# Patient Record
Sex: FEMALE | Race: WHITE | Employment: OTHER | URBAN - METROPOLITAN AREA
[De-identification: names, ages, dates, MRNs, and addresses within clinical notes are randomized per-mention and may not be internally consistent; named-entity substitution may affect disease eponyms.]

---

## 2017-01-11 ENCOUNTER — OFFICE VISIT (OUTPATIENT)
Dept: DERMATOLOGY CLINIC | Facility: CLINIC | Age: 72
End: 2017-01-11

## 2017-01-11 DIAGNOSIS — C69.90 OCULAR MELANOMA, UNSPECIFIED LATERALITY (HCC): ICD-10-CM

## 2017-01-11 DIAGNOSIS — L57.0 AK (ACTINIC KERATOSIS): ICD-10-CM

## 2017-01-11 DIAGNOSIS — D23.60 BENIGN NEOPLASM OF SKIN OF UPPER LIMB, INCLUDING SHOULDER, UNSPECIFIED LATERALITY: ICD-10-CM

## 2017-01-11 DIAGNOSIS — L82.1 SEBORRHEIC KERATOSES: ICD-10-CM

## 2017-01-11 DIAGNOSIS — D23.5 BENIGN NEOPLASM OF SKIN OF TRUNK, EXCEPT SCROTUM: ICD-10-CM

## 2017-01-11 DIAGNOSIS — D23.30 BENIGN NEOPLASM OF SKIN OF FACE: ICD-10-CM

## 2017-01-11 DIAGNOSIS — D22.9 MULTIPLE NEVI: Primary | ICD-10-CM

## 2017-01-11 DIAGNOSIS — L72.0 EPIDERMAL CYST: ICD-10-CM

## 2017-01-11 DIAGNOSIS — D23.4 BENIGN NEOPLASM OF SCALP AND SKIN OF NECK: ICD-10-CM

## 2017-01-11 DIAGNOSIS — D23.70 BENIGN NEOPLASM OF SKIN OF LOWER LIMB, INCLUDING HIP, UNSPECIFIED LATERALITY: ICD-10-CM

## 2017-01-11 PROCEDURE — 99213 OFFICE O/P EST LOW 20 MIN: CPT | Performed by: DERMATOLOGY

## 2017-01-11 PROCEDURE — G0463 HOSPITAL OUTPT CLINIC VISIT: HCPCS | Performed by: DERMATOLOGY

## 2017-01-31 NOTE — PROGRESS NOTES
Mary Lou Sheth is a 66year old female. HPI:     CC:  Patient presents with:  Full Skin Exam: LOV 9/7/2016. Pt presenting for 4 month f/u and full body skin exam. Personal hx of melanoma to R eye.          Allergies:  Review of patient's allergies indic Calcium-Phosphorus-Vitamin D (CITRACAL +D3 OR) Take 1 tablet by mouth daily. Disp:  Rfl:    Vitamin B-12 (VITAMIN B12) 1000 MCG Oral Tab Take 1,000 mcg by mouth daily. Disp:  Rfl:    loratadine (CLARITIN) 10 MG Oral Tab Take 10 mg by mouth daily.  Disp: Social History Narrative     Family History   Problem Relation Age of Onset   • Heart Attack Father 47     MI; per NG   • Stroke Mother 66     per NG   • Breast Cancer Mother 67     per NG   • Heart Disorder Mother      CHF; per NG   • Hypertension Mot physical exam also:    Assessment / plan:    No orders of the defined types were placed in this encounter.        Meds & Refills for this Visit:  No prescriptions requested or ordered in this encounter      Multiple nevi  (primary encounter diagnosis)  Ak (

## 2017-02-10 ENCOUNTER — OFFICE VISIT (OUTPATIENT)
Dept: PODIATRY CLINIC | Facility: CLINIC | Age: 72
End: 2017-02-10

## 2017-02-10 DIAGNOSIS — B35.1 PAIN DUE TO ONYCHOMYCOSIS OF TOENAIL OF LEFT FOOT: Primary | ICD-10-CM

## 2017-02-10 DIAGNOSIS — B35.1 PAIN DUE TO ONYCHOMYCOSIS OF TOENAIL OF RIGHT FOOT: ICD-10-CM

## 2017-02-10 DIAGNOSIS — M79.675 PAIN DUE TO ONYCHOMYCOSIS OF TOENAIL OF LEFT FOOT: Primary | ICD-10-CM

## 2017-02-10 DIAGNOSIS — M79.674 PAIN DUE TO ONYCHOMYCOSIS OF TOENAIL OF RIGHT FOOT: ICD-10-CM

## 2017-02-10 PROCEDURE — 11721 DEBRIDE NAIL 6 OR MORE: CPT | Performed by: PODIATRIST

## 2017-02-10 NOTE — PROGRESS NOTES
HPI:    Patient ID: Louisa Sánchez is a 66year old female. HPI  This 41-year-old female presents with recurrent pain. Patient reports having relief by this care previously.   Review of Systems  I did review present medical status, medications were n onychomycosis of toenail of left foot  (primary encounter diagnosis)  Pain due to onychomycosis of toenail of right foot    No orders of the defined types were placed in this encounter.        Meds This Visit:  No prescriptions requested or ordered in this

## 2017-02-14 RX ORDER — PRAVASTATIN SODIUM 20 MG
TABLET ORAL
Qty: 90 TABLET | Refills: 1 | Status: SHIPPED | OUTPATIENT
Start: 2017-02-14 | End: 2017-04-25

## 2017-04-19 ENCOUNTER — LAB ENCOUNTER (OUTPATIENT)
Dept: LAB | Facility: HOSPITAL | Age: 72
End: 2017-04-19
Attending: INTERNAL MEDICINE
Payer: MEDICARE

## 2017-04-19 DIAGNOSIS — E78.5 HYPERLIPIDEMIA, UNSPECIFIED HYPERLIPIDEMIA TYPE: ICD-10-CM

## 2017-04-19 DIAGNOSIS — I10 ESSENTIAL HYPERTENSION WITH GOAL BLOOD PRESSURE LESS THAN 130/80: ICD-10-CM

## 2017-04-19 PROCEDURE — 85025 COMPLETE CBC W/AUTO DIFF WBC: CPT

## 2017-04-19 PROCEDURE — 81001 URINALYSIS AUTO W/SCOPE: CPT

## 2017-04-19 PROCEDURE — 36415 COLL VENOUS BLD VENIPUNCTURE: CPT

## 2017-04-19 PROCEDURE — 84443 ASSAY THYROID STIM HORMONE: CPT

## 2017-04-19 PROCEDURE — 80061 LIPID PANEL: CPT

## 2017-04-19 PROCEDURE — 80053 COMPREHEN METABOLIC PANEL: CPT

## 2017-04-25 PROBLEM — L03.316 CELLULITIS, UMBILICAL: Status: ACTIVE | Noted: 2017-04-25

## 2017-04-25 NOTE — ASSESSMENT & PLAN NOTE
Lipid panel and liver function tests look stable on pravastatin at 20 mg 1 tablet once daily.   LDL cholesterol seems slightly higher than usual.  Advised to drink plenty of fluids, exercise on a regular basis and restrict fatty foods, desserts and starches

## 2017-04-25 NOTE — ASSESSMENT & PLAN NOTE
Patient is currently stable on citalopram at 10 mg 1 tablet once daily. She is able to manage all her regular activities and stay social at this point.   Continue the same dose of medications and follow-up as discussed

## 2017-04-25 NOTE — ASSESSMENT & PLAN NOTE
Patient is status post radiation treatment to the eye about 5 years back for a malignant melanoma. No significant abnormalities noted at this time. She is due for follow-up with Dr. Vlad Figueroa has been provided.

## 2017-04-25 NOTE — ASSESSMENT & PLAN NOTE
Blood pressure 136/80, pulse 59, resp. rate 18, height 5' 3.5\" (1.613 m), weight 187 lb 3.2 oz (84.913 kg). Blood pressures upon recheck looks stable. Advised to restrict salt in the diet, drink plenty of fluids to keep hydrated.   Continue on metoprolo

## 2017-04-25 NOTE — PATIENT INSTRUCTIONS
Problem List Items Addressed This Visit        Unprioritized    Anxiety state - Primary     Patient is currently stable on citalopram at 10 mg 1 tablet once daily. She is able to manage all her regular activities and stay social at this point.   Continue t Relevant Orders    OP REFERRAL TO [place]

## 2017-04-25 NOTE — ASSESSMENT & PLAN NOTE
No Known Allergies  Started on Keflex 500 mg 1 tablet 2 times daily for 7 days. Keep the umbilicus clean and dry. Advised to dry the area after showers to avoid collection of soap and water in the fold.   May need to see surgery for an evaluation if rayshawn

## 2017-04-25 NOTE — PROGRESS NOTES
HPI:    Patient ID: Donell Nick is a 67year old female.     HPI Comments: Notes Recorded by Eugenio Childers MD on 4/19/2017 at 8:22 PM  Urine test looks slightly abnormal–seems to be an inappropriate collection. Lorin Kahn may need to repeat this after adequ history of hypothyroidism or obesity. There are no known factors aggravating her hyperlipidemia. Pertinent negatives include no focal sensory loss or myalgias.  (Wt Readings from Last 6 Encounters:  07/12/16 : 193 lb (87.544 kg)  04/12/16 : 186 lb 8 oz (84. 1 TAB PO Q D Disp: 90 tablet Rfl: 1   Pravastatin Sodium 20 MG Oral Tab Take 1 tablet (20 mg total) by mouth once daily. Disp: 90 tablet Rfl: 1   cephALEXin 500 MG Oral Cap Take 1 capsule (500 mg total) by mouth 2 (two) times daily.  Disp: 14 capsule Rfl: 0 normal. She exhibits no distension and no mass. There is no tenderness. There is no rebound. Musculoskeletal: Normal range of motion. She exhibits no edema or tenderness. Lymphadenopathy:     She has no cervical adenopathy.    Neurological: She is alert Other Relevant Orders    COMP METABOLIC PANEL (14)    LIPID PANEL    URINALYSIS, ROUTINE    Hyperlipidemia     Lipid panel and liver function tests look stable on pravastatin at 20 mg 1 tablet once daily.   LDL cholesterol seems slightly higher than usual.

## 2017-05-19 ENCOUNTER — OFFICE VISIT (OUTPATIENT)
Dept: PODIATRY CLINIC | Facility: CLINIC | Age: 72
End: 2017-05-19

## 2017-05-19 ENCOUNTER — TELEPHONE (OUTPATIENT)
Dept: OPHTHALMOLOGY | Facility: CLINIC | Age: 72
End: 2017-05-19

## 2017-05-19 ENCOUNTER — TELEPHONE (OUTPATIENT)
Dept: INTERNAL MEDICINE CLINIC | Facility: CLINIC | Age: 72
End: 2017-05-19

## 2017-05-19 DIAGNOSIS — M79.675 PAIN DUE TO ONYCHOMYCOSIS OF TOENAIL OF LEFT FOOT: Primary | ICD-10-CM

## 2017-05-19 DIAGNOSIS — Z00.00 PREVENTATIVE HEALTH CARE: Primary | ICD-10-CM

## 2017-05-19 DIAGNOSIS — M79.674 PAIN DUE TO ONYCHOMYCOSIS OF TOENAIL OF RIGHT FOOT: ICD-10-CM

## 2017-05-19 DIAGNOSIS — B35.1 PAIN DUE TO ONYCHOMYCOSIS OF TOENAIL OF LEFT FOOT: Primary | ICD-10-CM

## 2017-05-19 DIAGNOSIS — B35.1 PAIN DUE TO ONYCHOMYCOSIS OF TOENAIL OF RIGHT FOOT: ICD-10-CM

## 2017-05-19 PROCEDURE — 11721 DEBRIDE NAIL 6 OR MORE: CPT | Performed by: PODIATRIST

## 2017-05-19 NOTE — PROGRESS NOTES
HPI:    Patient ID: Danilo Barrios is a 67year old female. HPI  This 50-year-old female presents with recurrent pain associated with her fungus toenails. She reports relief by previous debridement.   Review of Systems  I reviewed medical status, med ASSESSMENT/PLAN:   Pain due to onychomycosis of toenail of left foot  (primary encounter diagnosis)  Pain due to onychomycosis of toenail of right foot    No orders of the defined types were placed in this encounter.        Meds This Visit:  No prescripti

## 2017-05-19 NOTE — TELEPHONE ENCOUNTER
Patient stopped in the office.   Scheduled appointment with Dr. Maureen Thornton for an eye exam on 6/14/17 at 8:15am/nw

## 2017-06-14 ENCOUNTER — OFFICE VISIT (OUTPATIENT)
Dept: OPHTHALMOLOGY | Facility: CLINIC | Age: 72
End: 2017-06-14

## 2017-06-14 DIAGNOSIS — C69.91 OCULAR MELANOMA, RIGHT (HCC): Primary | ICD-10-CM

## 2017-06-14 DIAGNOSIS — H25.13 AGE-RELATED NUCLEAR CATARACT OF BOTH EYES: ICD-10-CM

## 2017-06-14 PROCEDURE — 92014 COMPRE OPH EXAM EST PT 1/>: CPT | Performed by: OPHTHALMOLOGY

## 2017-06-14 PROCEDURE — 92015 DETERMINE REFRACTIVE STATE: CPT | Performed by: OPHTHALMOLOGY

## 2017-06-14 NOTE — ASSESSMENT & PLAN NOTE
Diagnosed in 2010 and treated by Dr. Luis Winters at [facility]. Continue to follow up yearly.

## 2017-06-14 NOTE — PATIENT INSTRUCTIONS
Age-related nuclear cataract of both eyes  Discussed with patient that there is a significant cataract in the right eye, but due to poor vision due to melanoma will not consider surgery unless Dr. Obie Meraz requests it.   Cataract in the left eye is not visuall

## 2017-06-14 NOTE — ASSESSMENT & PLAN NOTE
Discussed with patient that there is a significant cataract in the right eye, but due to poor vision due to melanoma will not consider surgery unless Dr. Josue Franco requests it.   Cataract in the left eye is not visually significant enough to remove at this time

## 2017-06-14 NOTE — PROGRESS NOTES
Lorelei Morse is a 67year old female. HPI:     HPI     Pt complains of blurred vision in her left eye and would like an updated Rx. Pt has been seeing a Dr. 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  Pt is not using Tablet 24 Hr 1 TAB PO Q D Disp: 90 tablet Rfl: 1   Pravastatin Sodium 20 MG Oral Tab Take 1 tablet (20 mg total) by mouth once daily. Disp: 90 tablet Rfl: 1   Calcium-Phosphorus-Vitamin D (CITRACAL +D3 OR) Take 1 tablet by mouth daily.  Disp:  Rfl:    Vitam Ratio  0.5    Macula red reflex only Normal    Vessels  Normal    Periphery RPE hypopigmentation nasally  Normal            Refraction     Wearing Rx      Sphere Cylinder Axis Add   Right -0.25 +0.75 180 +2.75   Left +0.00 +0.75 180 +2.75       Type:  Flat

## 2017-07-12 ENCOUNTER — OFFICE VISIT (OUTPATIENT)
Dept: DERMATOLOGY CLINIC | Facility: CLINIC | Age: 72
End: 2017-07-12

## 2017-07-12 DIAGNOSIS — D23.4 BENIGN NEOPLASM OF SCALP AND SKIN OF NECK: ICD-10-CM

## 2017-07-12 DIAGNOSIS — C69.90 OCULAR MELANOMA, UNSPECIFIED LATERALITY (HCC): ICD-10-CM

## 2017-07-12 DIAGNOSIS — D23.30 BENIGN NEOPLASM OF SKIN OF FACE: ICD-10-CM

## 2017-07-12 DIAGNOSIS — D23.70 BENIGN NEOPLASM OF SKIN OF LOWER LIMB, INCLUDING HIP, UNSPECIFIED LATERALITY: ICD-10-CM

## 2017-07-12 DIAGNOSIS — L82.1 SEBORRHEIC KERATOSES: ICD-10-CM

## 2017-07-12 DIAGNOSIS — D23.60 BENIGN NEOPLASM OF SKIN OF UPPER LIMB, INCLUDING SHOULDER, UNSPECIFIED LATERALITY: ICD-10-CM

## 2017-07-12 DIAGNOSIS — D22.9 MULTIPLE NEVI: Primary | ICD-10-CM

## 2017-07-12 DIAGNOSIS — D23.5 BENIGN NEOPLASM OF SKIN OF TRUNK, EXCEPT SCROTUM: ICD-10-CM

## 2017-07-12 PROCEDURE — G0463 HOSPITAL OUTPT CLINIC VISIT: HCPCS | Performed by: DERMATOLOGY

## 2017-07-12 PROCEDURE — 99213 OFFICE O/P EST LOW 20 MIN: CPT | Performed by: DERMATOLOGY

## 2017-07-12 RX ORDER — KETOCONAZOLE 20 MG/G
CREAM TOPICAL
Qty: 60 G | Refills: 3 | Status: SHIPPED | OUTPATIENT
Start: 2017-07-12 | End: 2018-04-26

## 2017-07-12 RX ORDER — KETOCONAZOLE 20 MG/G
CREAM TOPICAL
Qty: 30 G | Refills: 3 | Status: SHIPPED | OUTPATIENT
Start: 2017-07-12 | End: 2017-07-12

## 2017-07-25 ENCOUNTER — APPOINTMENT (OUTPATIENT)
Dept: LAB | Facility: HOSPITAL | Age: 72
End: 2017-07-25
Attending: INTERNAL MEDICINE
Payer: MEDICARE

## 2017-07-25 DIAGNOSIS — I10 ESSENTIAL HYPERTENSION: ICD-10-CM

## 2017-07-25 DIAGNOSIS — E78.5 HYPERLIPIDEMIA, UNSPECIFIED HYPERLIPIDEMIA TYPE: ICD-10-CM

## 2017-07-25 LAB
ALBUMIN SERPL BCP-MCNC: 3.9 G/DL (ref 3.5–4.8)
ALBUMIN/GLOB SERPL: 1.2 {RATIO} (ref 1–2)
ALP SERPL-CCNC: 79 U/L (ref 32–100)
ANION GAP SERPL CALC-SCNC: 9 MMOL/L (ref 0–18)
BILIRUB UR QL: NEGATIVE
BUN SERPL-MCNC: 16 MG/DL (ref 8–20)
BUN/CREAT SERPL: 17.2 (ref 10–20)
CALCIUM SERPL-MCNC: 9.2 MG/DL (ref 8.5–10.5)
CHLORIDE SERPL-SCNC: 103 MMOL/L (ref 95–110)
CHOLEST SERPL-MCNC: 192 MG/DL (ref 110–200)
CLARITY UR: CLEAR
CO2 SERPL-SCNC: 25 MMOL/L (ref 22–32)
COLOR UR: YELLOW
CREAT SERPL-MCNC: 0.93 MG/DL (ref 0.5–1.5)
GLOBULIN PLAS-MCNC: 3.3 G/DL (ref 2.5–3.7)
GLUCOSE SERPL-MCNC: 109 MG/DL (ref 70–99)
GLUCOSE UR-MCNC: NEGATIVE MG/DL
HDLC SERPL-MCNC: 49 MG/DL
HGB UR QL STRIP.AUTO: NEGATIVE
KETONES UR-MCNC: NEGATIVE MG/DL
LDLC SERPL CALC-MCNC: 101 MG/DL (ref 0–99)
NITRITE UR QL STRIP.AUTO: NEGATIVE
NONHDLC SERPL-MCNC: 143 MG/DL
OSMOLALITY UR CALC.SUM OF ELEC: 286 MOSM/KG (ref 275–295)
PATIENT FASTING: YES
PH UR: 6 [PH] (ref 5–8)
PROT SERPL-MCNC: 7.2 G/DL (ref 5.9–8.4)
PROT UR-MCNC: NEGATIVE MG/DL
RBC #/AREA URNS AUTO: <1 /HPF
SODIUM SERPL-SCNC: 137 MMOL/L (ref 136–144)
SP GR UR STRIP: 1.01 (ref 1–1.03)
TRIGL SERPL-MCNC: 208 MG/DL (ref 1–149)
UROBILINOGEN UR STRIP-ACNC: <2
VIT C UR-MCNC: NEGATIVE MG/DL
WBC #/AREA URNS AUTO: 2 /HPF

## 2017-07-25 PROCEDURE — 80053 COMPREHEN METABOLIC PANEL: CPT

## 2017-07-25 PROCEDURE — 80061 LIPID PANEL: CPT

## 2017-07-25 PROCEDURE — 36415 COLL VENOUS BLD VENIPUNCTURE: CPT

## 2017-07-25 PROCEDURE — 81001 URINALYSIS AUTO W/SCOPE: CPT

## 2017-07-27 ENCOUNTER — OFFICE VISIT (OUTPATIENT)
Dept: INTERNAL MEDICINE CLINIC | Facility: CLINIC | Age: 72
End: 2017-07-27

## 2017-07-27 VITALS
HEART RATE: 67 BPM | WEIGHT: 186.81 LBS | RESPIRATION RATE: 20 BRPM | TEMPERATURE: 98 F | DIASTOLIC BLOOD PRESSURE: 80 MMHG | SYSTOLIC BLOOD PRESSURE: 136 MMHG | HEIGHT: 63.5 IN | BODY MASS INDEX: 32.69 KG/M2

## 2017-07-27 DIAGNOSIS — Z78.0 MENOPAUSE: ICD-10-CM

## 2017-07-27 DIAGNOSIS — E55.9 VITAMIN D DEFICIENCY: ICD-10-CM

## 2017-07-27 DIAGNOSIS — I10 ESSENTIAL HYPERTENSION: Primary | ICD-10-CM

## 2017-07-27 DIAGNOSIS — R73.9 ELEVATED BLOOD SUGAR: ICD-10-CM

## 2017-07-27 DIAGNOSIS — E78.5 HYPERLIPIDEMIA, UNSPECIFIED HYPERLIPIDEMIA TYPE: ICD-10-CM

## 2017-07-27 DIAGNOSIS — L03.316 CELLULITIS, UMBILICAL: ICD-10-CM

## 2017-07-27 PROCEDURE — 99214 OFFICE O/P EST MOD 30 MIN: CPT | Performed by: INTERNAL MEDICINE

## 2017-07-27 PROCEDURE — G0463 HOSPITAL OUTPT CLINIC VISIT: HCPCS | Performed by: INTERNAL MEDICINE

## 2017-07-27 NOTE — PROGRESS NOTES
HPI:    Patient ID: Bree Willis is a 67year old female. Labs discussed–blood sugars are elevated, triglycerides are quite high up to 208 at this time from 113 in the past.  Renal functions–the EGFR slightly low at 59.   Rest of the labs look basic Current antihyperlipidemic treatment includes diet change, exercise and statins. The current treatment provides moderate improvement of lipids. Compliance problems include adherence to diet and adherence to exercise.   Risk factors for coronary artery disea folic acid (FOLVITE) 319 MCG Oral Tab Take 400 mcg by mouth daily. Disp:  Rfl:    magnesium 250 MG Oral Tab Take 250 mg by mouth daily. Disp:  Rfl:    Acetaminophen (TYLENOL EXTRA STRENGTH OR) Take  by mouth.  Disp:  Rfl:    aspirin 325 MG Oral Tab Take Psychiatric: Her behavior is normal. Judgment and thought content normal. Her mood appears anxious. Her speech is rapid and/or pressured. Cognition and memory are normal.   Nursing note and vitals reviewed.              ASSESSMENT/PLAN:   Essential hypert (CPT=77080)         Return in about 3 months (around 10/27/2017).   PT UNDERSTANDS AND AGREES TO FOLLOW DIRECTIONS AND ADVICE         Orders Placed This Encounter      Comp Metabolic Panel (14) [E]      Hemoglobin A1C [E]      Lipid Panel [E]      Patti Everett

## 2017-07-27 NOTE — ASSESSMENT & PLAN NOTE
Advised to continue to use the ketoconazole as discussed with dermatology.   No further intervention

## 2017-07-27 NOTE — ASSESSMENT & PLAN NOTE
Has been supplemented in the past.  Continue to take an over-the-counter vitamin D supplement at 2000 units once daily. Recheck labs and DEXA scan has been ordered.

## 2017-07-27 NOTE — ASSESSMENT & PLAN NOTE
Lipid panel and liver function tests discussed. Triglycerides are quite high. Strict diet control, exercise on a daily basis and recheck labs as ordered. See me in 3 months.

## 2017-07-27 NOTE — PATIENT INSTRUCTIONS
Problem List Items Addressed This Visit        Unprioritized    Cellulitis, umbilical     Advised to continue to use the ketoconazole as discussed with dermatology.   No further intervention         Essential hypertension - Primary     Blood pressure 136/80

## 2017-07-27 NOTE — ASSESSMENT & PLAN NOTE
Blood pressure 136/80, pulse 67, temperature 98.2 °F (36.8 °C), temperature source Oral, resp. rate 20, height 5' 3.5\" (1.613 m), weight 186 lb 12.8 oz (84.7 kg), not currently breastfeeding. Stable blood pressure on metoprolol.   Tolerating current medic

## 2017-07-30 NOTE — PROGRESS NOTES
Faria Cousin is a 67year old female. HPI:     CC:  Patient presents with:  Full Skin Exam: LOV 1/11/2017. Pt presenting for 6 month full body skin exam. Pt has a personal hx of melanoma to R eye.   Rash Skin Problem (integumentary): Pt was previously External Ointment Apply 1 Application topically 3 (three) times daily. Disp: 30 g Rfl: 3   ketoconazole 2 % External Cream Apply to affected area 2 times daily.  Disp: 60 g Rfl: 3   Citalopram Hydrobromide 10 MG Oral Tab Take 1 tablet (10 mg total) by mouth education: N/A  Number of children: N/A     Occupational History  None on file     Social History Main Topics   Smoking status: Former Smoker  0.50 Packs/day  For 14.00 Years     Types: Cigarettes    Quit date: 1/1/1979    Smokeless tobacco: Never Used performed, including scalp, head, neck, face,nails, hair, external eyes, including conjunctival mucosa, eyelids, lips external ears, back, chest,/ breasts, axillae,  abdomen, arms, legs, palms.      Multiple light to medium brown, well marginated, uniformly seborrheic  keratoses, cherry angiomas:  Reassurance regarding other benign skin lesions. Signs and symptoms of skin cancer, ABCDE's of melanoma discussed with patient. Sunscreen use, sun protection, self exams reviewed.   Followup as noted RTC routine check

## 2017-08-21 ENCOUNTER — OFFICE VISIT (OUTPATIENT)
Dept: PODIATRY CLINIC | Facility: CLINIC | Age: 72
End: 2017-08-21

## 2017-08-21 ENCOUNTER — HOSPITAL ENCOUNTER (OUTPATIENT)
Dept: MAMMOGRAPHY | Facility: HOSPITAL | Age: 72
Discharge: HOME | End: 2017-08-21
Attending: INTERNAL MEDICINE | Admitting: PODIATRIST
Payer: MEDICARE

## 2017-08-21 DIAGNOSIS — B35.1 PAIN DUE TO ONYCHOMYCOSIS OF TOENAIL OF RIGHT FOOT: ICD-10-CM

## 2017-08-21 DIAGNOSIS — M79.675 PAIN DUE TO ONYCHOMYCOSIS OF TOENAIL OF LEFT FOOT: Primary | ICD-10-CM

## 2017-08-21 DIAGNOSIS — B35.1 PAIN DUE TO ONYCHOMYCOSIS OF TOENAIL OF LEFT FOOT: Primary | ICD-10-CM

## 2017-08-21 DIAGNOSIS — Z00.00 PREVENTATIVE HEALTH CARE: ICD-10-CM

## 2017-08-21 DIAGNOSIS — M79.674 PAIN DUE TO ONYCHOMYCOSIS OF TOENAIL OF RIGHT FOOT: ICD-10-CM

## 2017-08-21 PROCEDURE — 77067 SCR MAMMO BI INCL CAD: CPT | Performed by: INTERNAL MEDICINE

## 2017-08-21 PROCEDURE — 11721 DEBRIDE NAIL 6 OR MORE: CPT | Performed by: PODIATRIST

## 2017-08-21 NOTE — PROGRESS NOTES
HPI:    Patient ID: Noble Monte is a 67year old female. HPI  This 56-year-old female presents with recurrent pain associated with her fungus toenails. She reports relief by previous care.   Review of Systems  I did review present medical status, relief will see patient for care if and when symptoms redevelop         ASSESSMENT/PLAN:   Pain due to onychomycosis of toenail of left foot  (primary encounter diagnosis)  Pain due to onychomycosis of toenail of right foot    No orders of the defined type

## 2017-08-22 ENCOUNTER — HOSPITAL ENCOUNTER (OUTPATIENT)
Dept: BONE DENSITY | Facility: HOSPITAL | Age: 72
Discharge: HOME | End: 2017-08-22
Attending: INTERNAL MEDICINE
Payer: MEDICARE

## 2017-08-22 DIAGNOSIS — E55.9 VITAMIN D DEFICIENCY: ICD-10-CM

## 2017-08-22 DIAGNOSIS — Z78.0 MENOPAUSE: ICD-10-CM

## 2017-08-22 PROCEDURE — 77080 DXA BONE DENSITY AXIAL: CPT | Performed by: INTERNAL MEDICINE

## 2017-09-12 RX ORDER — CITALOPRAM 10 MG/1
TABLET ORAL
Qty: 90 TABLET | Refills: 0 | Status: SHIPPED | OUTPATIENT
Start: 2017-09-12 | End: 2017-10-26

## 2017-10-19 ENCOUNTER — APPOINTMENT (OUTPATIENT)
Dept: LAB | Facility: HOSPITAL | Age: 72
End: 2017-10-19
Attending: INTERNAL MEDICINE
Payer: MEDICARE

## 2017-10-19 DIAGNOSIS — R73.9 ELEVATED BLOOD SUGAR: ICD-10-CM

## 2017-10-19 DIAGNOSIS — E78.5 HYPERLIPIDEMIA, UNSPECIFIED HYPERLIPIDEMIA TYPE: ICD-10-CM

## 2017-10-19 DIAGNOSIS — E55.9 VITAMIN D DEFICIENCY: ICD-10-CM

## 2017-10-19 DIAGNOSIS — I10 ESSENTIAL HYPERTENSION: ICD-10-CM

## 2017-10-19 PROCEDURE — 81001 URINALYSIS AUTO W/SCOPE: CPT

## 2017-10-19 PROCEDURE — 36415 COLL VENOUS BLD VENIPUNCTURE: CPT

## 2017-10-19 PROCEDURE — 82306 VITAMIN D 25 HYDROXY: CPT

## 2017-10-19 PROCEDURE — 80061 LIPID PANEL: CPT

## 2017-10-19 PROCEDURE — 83036 HEMOGLOBIN GLYCOSYLATED A1C: CPT

## 2017-10-19 PROCEDURE — 80053 COMPREHEN METABOLIC PANEL: CPT

## 2017-10-26 ENCOUNTER — OFFICE VISIT (OUTPATIENT)
Dept: INTERNAL MEDICINE CLINIC | Facility: CLINIC | Age: 72
End: 2017-10-26

## 2017-10-26 VITALS
SYSTOLIC BLOOD PRESSURE: 136 MMHG | HEART RATE: 66 BPM | TEMPERATURE: 98 F | WEIGHT: 189 LBS | HEIGHT: 63.5 IN | RESPIRATION RATE: 20 BRPM | BODY MASS INDEX: 33.07 KG/M2 | DIASTOLIC BLOOD PRESSURE: 78 MMHG

## 2017-10-26 DIAGNOSIS — E55.9 VITAMIN D DEFICIENCY: ICD-10-CM

## 2017-10-26 DIAGNOSIS — I10 ESSENTIAL HYPERTENSION: ICD-10-CM

## 2017-10-26 DIAGNOSIS — E78.5 HYPERLIPIDEMIA, UNSPECIFIED HYPERLIPIDEMIA TYPE: Primary | ICD-10-CM

## 2017-10-26 PROCEDURE — G0009 ADMIN PNEUMOCOCCAL VACCINE: HCPCS | Performed by: INTERNAL MEDICINE

## 2017-10-26 PROCEDURE — 90670 PCV13 VACCINE IM: CPT | Performed by: INTERNAL MEDICINE

## 2017-10-26 PROCEDURE — G0463 HOSPITAL OUTPT CLINIC VISIT: HCPCS | Performed by: INTERNAL MEDICINE

## 2017-10-26 PROCEDURE — 99214 OFFICE O/P EST MOD 30 MIN: CPT | Performed by: INTERNAL MEDICINE

## 2017-10-26 RX ORDER — PRAVASTATIN SODIUM 20 MG
20 TABLET ORAL
Qty: 90 TABLET | Refills: 1 | Status: SHIPPED | OUTPATIENT
Start: 2017-10-26 | End: 2018-07-19

## 2017-10-26 RX ORDER — METOPROLOL SUCCINATE 25 MG/1
TABLET, EXTENDED RELEASE ORAL
Qty: 90 TABLET | Refills: 1 | Status: SHIPPED | OUTPATIENT
Start: 2017-10-26 | End: 2018-03-14

## 2017-10-26 RX ORDER — CITALOPRAM 10 MG/1
10 TABLET ORAL
Qty: 90 TABLET | Refills: 1 | Status: SHIPPED | OUTPATIENT
Start: 2017-10-26 | End: 2018-04-26

## 2017-10-26 NOTE — PROGRESS NOTES
HPI:    Patient ID: Zeenat Choudhary is a 67year old female. Labs, mammogram and DEXA scan discussed. Hypertension   This is a chronic problem. The current episode started more than 1 year ago.  The problem has been gradually improving since onset dyslipidemia. Review of Systems   Constitutional: Negative. HENT: Negative. Eyes: Negative. Respiratory: Negative. Cardiovascular: Negative. Gastrointestinal: Negative. Endocrine: Negative. Genitourinary: Negative.     Musculoskel External ear normal.   Left Ear: External ear normal.   Nose: Nose normal.   Mouth/Throat: Oropharynx is clear and moist. No oropharyngeal exudate. Eyes: Conjunctivae and EOM are normal. Pupils are equal, round, and reactive to light.  Right eye exhibits months. Patient has taken her flu shot. Due for a Tdap–advised to take it at the pharmacy is not covered per Medicare in the office. She is due for Prevnar which has been provided today. Immunizations records as follows.   Immunization History   Adminis

## 2017-10-26 NOTE — ASSESSMENT & PLAN NOTE
Blood pressure 136/78, pulse 66, temperature 98 °F (36.7 °C), temperature source Oral, resp. rate 20, height 5' 3.5\" (1.613 m), weight 189 lb (85.7 kg), not currently breastfeeding. Blood pressure looks stable and metoprolol.   Tolerating medications we

## 2017-10-26 NOTE — PATIENT INSTRUCTIONS
Problem List Items Addressed This Visit        Unprioritized    Essential hypertension     Blood pressure 136/78, pulse 66, temperature 98 °F (36.7 °C), temperature source Oral, resp.  rate 20, height 5' 3.5\" (1.613 m), weight 189 lb (85.7 kg), not current

## 2017-10-26 NOTE — ASSESSMENT & PLAN NOTE
Lipid panel shows significant improvement in the triglycerides. Continue strict diet controlled at this time and will follow up with recheck labs in 6 months. Patient has taken her flu shot.   Due for a Tdap–advised to take it at the pharmacy is not cover

## 2017-11-21 ENCOUNTER — OFFICE VISIT (OUTPATIENT)
Dept: PODIATRY CLINIC | Facility: CLINIC | Age: 72
End: 2017-11-21

## 2017-11-21 DIAGNOSIS — B35.1 PAIN DUE TO ONYCHOMYCOSIS OF TOENAIL OF LEFT FOOT: Primary | ICD-10-CM

## 2017-11-21 DIAGNOSIS — M79.674 PAIN DUE TO ONYCHOMYCOSIS OF TOENAIL OF RIGHT FOOT: ICD-10-CM

## 2017-11-21 DIAGNOSIS — M79.675 PAIN DUE TO ONYCHOMYCOSIS OF TOENAIL OF LEFT FOOT: Primary | ICD-10-CM

## 2017-11-21 DIAGNOSIS — B35.1 PAIN DUE TO ONYCHOMYCOSIS OF TOENAIL OF RIGHT FOOT: ICD-10-CM

## 2017-11-21 PROCEDURE — 11721 DEBRIDE NAIL 6 OR MORE: CPT | Performed by: PODIATRIST

## 2017-11-21 NOTE — PROGRESS NOTES
HPI:    Patient ID: Glenroy Charles is a 67year old female. HPI  This 24-year-old female presents and asked that I care for her painful toenails. She reports relief by previous debridement.   Review of Systems  I did review medical status and medicat I anticipate relief and will see patient for care if and when symptoms redevelop         ASSESSMENT/PLAN:   Pain due to onychomycosis of toenail of left foot  (primary encounter diagnosis)  Pain due to onychomycosis of toenail of right foot    No orders of

## 2017-12-14 ENCOUNTER — MED REC SCAN ONLY (OUTPATIENT)
Dept: INTERNAL MEDICINE CLINIC | Facility: CLINIC | Age: 72
End: 2017-12-14

## 2018-01-17 ENCOUNTER — OFFICE VISIT (OUTPATIENT)
Dept: DERMATOLOGY CLINIC | Facility: CLINIC | Age: 73
End: 2018-01-17

## 2018-01-17 DIAGNOSIS — C69.90 OCULAR MELANOMA, UNSPECIFIED LATERALITY (HCC): ICD-10-CM

## 2018-01-17 DIAGNOSIS — L57.0 AK (ACTINIC KERATOSIS): ICD-10-CM

## 2018-01-17 DIAGNOSIS — D23.30 BENIGN NEOPLASM OF SKIN OF FACE: ICD-10-CM

## 2018-01-17 DIAGNOSIS — D23.4 BENIGN NEOPLASM OF SCALP AND SKIN OF NECK: ICD-10-CM

## 2018-01-17 DIAGNOSIS — L30.9 DERMATITIS: ICD-10-CM

## 2018-01-17 DIAGNOSIS — D23.60 BENIGN NEOPLASM OF SKIN OF UPPER LIMB, INCLUDING SHOULDER, UNSPECIFIED LATERALITY: ICD-10-CM

## 2018-01-17 DIAGNOSIS — D23.5 BENIGN NEOPLASM OF SKIN OF TRUNK, EXCEPT SCROTUM: ICD-10-CM

## 2018-01-17 DIAGNOSIS — B37.2 INTERTRIGINOUS CANDIDIASIS: Primary | ICD-10-CM

## 2018-01-17 DIAGNOSIS — D23.70 BENIGN NEOPLASM OF SKIN OF LOWER LIMB, INCLUDING HIP, UNSPECIFIED LATERALITY: ICD-10-CM

## 2018-01-17 DIAGNOSIS — L82.1 SEBORRHEIC KERATOSES: ICD-10-CM

## 2018-01-17 DIAGNOSIS — D22.9 MULTIPLE NEVI: ICD-10-CM

## 2018-01-17 PROCEDURE — G0463 HOSPITAL OUTPT CLINIC VISIT: HCPCS | Performed by: DERMATOLOGY

## 2018-01-17 PROCEDURE — 99213 OFFICE O/P EST LOW 20 MIN: CPT | Performed by: DERMATOLOGY

## 2018-01-29 NOTE — PROGRESS NOTES
Sridhar Garcia is a 67year old female. HPI:     CC:  Patient presents with:  Full Skin Exam:  LOV- 7-12-17- Pt presenting today for 6 month full body skin exam pt denies something new or changing Personal hx of melanoma to R eye.    Rash: Pt also zohra Cigarettes     Quit date: 1/1/1979  Smokeless tobacco: Never Used                      Alcohol use:  No                   Current Outpatient Prescriptions:  Metoprolol Succinate ER 25 MG Oral Tablet 24 Hr 1 TAB PO Q D Disp: 90 tablet Rfl: 1   Citalopram Hyd HYSTERECTOMY  2010: OTHER SURGICAL HISTORY      Comment: Melanoma of Choroid: Radiation plaque for 7                days; treated    Social History  Social History   Marital status:   Spouse name: N/A    Years of education: N/A  Number of children: allergies reviewed as noted. ROS:  Denies any other systemic complaints. No new or changeing lesions other than noted above. No fevers, chills, night sweats, unusual sun sensitivity. No other skin complaints.         History, medications, allergies r has not been using anything since that time. More redness around it been very dry crusty previously now just patchy erythema. Discussed topical and oral medications. Would continue with the mupirocin fluconazole again if this flares up.   Consider repeat

## 2018-03-14 NOTE — TELEPHONE ENCOUNTER
Per pt, she needs refill on her Metoprolol Succinate ER need to send to mail in pharmacy,.       Current Outpatient Prescriptions:  Metoprolol Succinate ER 25 MG Oral Tablet 24 Hr 1 TAB PO Q D Disp: 90 tablet Rfl: 1

## 2018-03-16 RX ORDER — METOPROLOL SUCCINATE 25 MG/1
TABLET, EXTENDED RELEASE ORAL
Qty: 90 TABLET | Refills: 1 | Status: SHIPPED | OUTPATIENT
Start: 2018-03-16 | End: 2018-08-07

## 2018-03-23 ENCOUNTER — OFFICE VISIT (OUTPATIENT)
Dept: PODIATRY CLINIC | Facility: CLINIC | Age: 73
End: 2018-03-23

## 2018-03-23 DIAGNOSIS — B35.1 PAIN DUE TO ONYCHOMYCOSIS OF TOENAIL OF RIGHT FOOT: ICD-10-CM

## 2018-03-23 DIAGNOSIS — M79.674 PAIN DUE TO ONYCHOMYCOSIS OF TOENAIL OF RIGHT FOOT: ICD-10-CM

## 2018-03-23 DIAGNOSIS — B35.1 PAIN DUE TO ONYCHOMYCOSIS OF TOENAIL OF LEFT FOOT: Primary | ICD-10-CM

## 2018-03-23 DIAGNOSIS — M79.675 PAIN DUE TO ONYCHOMYCOSIS OF TOENAIL OF LEFT FOOT: Primary | ICD-10-CM

## 2018-03-23 PROCEDURE — 11721 DEBRIDE NAIL 6 OR MORE: CPT | Performed by: PODIATRIST

## 2018-03-23 NOTE — PROGRESS NOTES
HPI:    Patient ID: Ravi Cr is a 67year old female. HPI  This 69-year-old female presents with recurrent pain associated with her nails. Patient reports relief by previous care.   I did review medical status, medications were noted and she ha debridement.   I anticipate relief will see patient for care if and when symptoms redevelop         ASSESSMENT/PLAN:   Pain due to onychomycosis of toenail of left foot  (primary encounter diagnosis)  Pain due to onychomycosis of toenail of right foot    No

## 2018-04-20 ENCOUNTER — LAB ENCOUNTER (OUTPATIENT)
Dept: LAB | Facility: HOSPITAL | Age: 73
End: 2018-04-20
Attending: INTERNAL MEDICINE
Payer: MEDICARE

## 2018-04-20 DIAGNOSIS — E78.5 HYPERLIPIDEMIA, UNSPECIFIED HYPERLIPIDEMIA TYPE: ICD-10-CM

## 2018-04-20 PROCEDURE — 80061 LIPID PANEL: CPT

## 2018-04-20 PROCEDURE — 36415 COLL VENOUS BLD VENIPUNCTURE: CPT

## 2018-04-20 PROCEDURE — 80053 COMPREHEN METABOLIC PANEL: CPT

## 2018-04-20 PROCEDURE — 85025 COMPLETE CBC W/AUTO DIFF WBC: CPT

## 2018-04-26 ENCOUNTER — OFFICE VISIT (OUTPATIENT)
Dept: INTERNAL MEDICINE CLINIC | Facility: CLINIC | Age: 73
End: 2018-04-26

## 2018-04-26 VITALS
DIASTOLIC BLOOD PRESSURE: 80 MMHG | SYSTOLIC BLOOD PRESSURE: 130 MMHG | RESPIRATION RATE: 18 BRPM | WEIGHT: 190 LBS | HEIGHT: 63.5 IN | HEART RATE: 63 BPM | TEMPERATURE: 99 F | BODY MASS INDEX: 33.25 KG/M2

## 2018-04-26 DIAGNOSIS — Z98.890 H/O CAROTID ENDARTERECTOMY: ICD-10-CM

## 2018-04-26 DIAGNOSIS — E78.5 HYPERLIPIDEMIA, UNSPECIFIED HYPERLIPIDEMIA TYPE: ICD-10-CM

## 2018-04-26 DIAGNOSIS — R09.89 LEFT CAROTID BRUIT: ICD-10-CM

## 2018-04-26 DIAGNOSIS — I10 ESSENTIAL HYPERTENSION: ICD-10-CM

## 2018-04-26 DIAGNOSIS — E55.9 VITAMIN D DEFICIENCY: Primary | ICD-10-CM

## 2018-04-26 DIAGNOSIS — F32.A MILD DEPRESSION: ICD-10-CM

## 2018-04-26 DIAGNOSIS — C69.91 MALIGNANT MELANOMA OF RIGHT EYE (HCC): ICD-10-CM

## 2018-04-26 PROCEDURE — G0463 HOSPITAL OUTPT CLINIC VISIT: HCPCS | Performed by: INTERNAL MEDICINE

## 2018-04-26 PROCEDURE — 99214 OFFICE O/P EST MOD 30 MIN: CPT | Performed by: INTERNAL MEDICINE

## 2018-04-26 RX ORDER — CITALOPRAM 10 MG/1
10 TABLET ORAL
Qty: 90 TABLET | Refills: 1 | Status: SHIPPED | OUTPATIENT
Start: 2018-04-26 | End: 2018-10-31

## 2018-04-26 NOTE — ASSESSMENT & PLAN NOTE
Patient has been stable on over-the-counter vitamin D at 2000 units daily. Advised to continue the same.

## 2018-04-26 NOTE — PROGRESS NOTES
HPI:    Patient ID: Nakul De Los Santos is a 75year old female. Hyperlipidemia   This is a chronic problem. The current episode started more than 1 year ago. The problem is controlled.  Recent lipid tests were reviewed and are normal. She has no history o Allergic/Immunologic: Negative. Neurological: Negative. Hematological: Negative. Psychiatric/Behavioral: Positive for decreased concentration and dysphoric mood.               Current Outpatient Prescriptions:  Citalopram Hydrobromide 10 MG Oral present. Cardiovascular: Normal rate, regular rhythm, normal heart sounds and intact distal pulses. No murmur heard. Pulmonary/Chest: Effort normal and breath sounds normal. She has no wheezes. She has no rales. She exhibits no tenderness.    Abdomina visit shows persistent elevation though did improve prior to that. Patient has not been controlling her diet carefully–advised to watch the diet for fatty foods, fried foods, desserts sugars and juices.   Will recheck prior to the next office visit and con

## 2018-04-26 NOTE — ASSESSMENT & PLAN NOTE
History of right carotid endarterectomy. Currently with a left carotid bruit. Ultrasound carotids have been ordered. Last completed in 2015.

## 2018-04-26 NOTE — ASSESSMENT & PLAN NOTE
Patient has been on pravastatin at 20 mg daily. She has had fluctuating triglycerides. She was placed on strict diet control additionally and on her blood tests at her last visit shows persistent elevation though did improve prior to that.   Patient has n

## 2018-04-26 NOTE — ASSESSMENT & PLAN NOTE
Mild depression–patient has been on citalopram–10 mg 1 tablet once daily. Continue the same dose of medications.

## 2018-04-26 NOTE — ASSESSMENT & PLAN NOTE
Blood pressure 130/80, pulse 63, temperature 98.6 °F (37 °C), temperature source Oral, resp. rate 18, height 5' 3.5\" (1.613 m), weight 190 lb (86.2 kg), not currently breastfeeding. Stable blood pressure upon recheck.   Continue on metoprolol at 25 mg onc

## 2018-04-26 NOTE — PATIENT INSTRUCTIONS
Problem List Items Addressed This Visit        Unprioritized    Essential hypertension     Blood pressure 130/80, pulse 63, temperature 98.6 °F (37 °C), temperature source Oral, resp.  rate 18, height 5' 3.5\" (1.613 m), weight 190 lb (86.2 kg), not current ASAP–Dr. Devon Holcomb, referral generated. Additional findings of posterior vitreous detachment–stable on the left side. Vitamin D deficiency - Primary     Patient has been stable on over-the-counter vitamin D at 2000 units daily.   Advised to continue th

## 2018-04-26 NOTE — ASSESSMENT & PLAN NOTE
Patient was diagnosed in 2010  and treated per Dr. Romeo Adam. Last follow-up in December 2017–  Regressed choroidal melanoma–OD. Tubal mass seems to be well treated by plaque radiation.   Patient was advised at that time about the possibility of tumor spreadi

## 2018-07-16 ENCOUNTER — OFFICE VISIT (OUTPATIENT)
Dept: DERMATOLOGY CLINIC | Facility: CLINIC | Age: 73
End: 2018-07-16

## 2018-07-16 DIAGNOSIS — D22.9 MULTIPLE NEVI: Primary | ICD-10-CM

## 2018-07-16 DIAGNOSIS — L72.0 EPIDERMAL CYST: ICD-10-CM

## 2018-07-16 DIAGNOSIS — D23.5 BENIGN NEOPLASM OF SKIN OF TRUNK, EXCEPT SCROTUM: ICD-10-CM

## 2018-07-16 DIAGNOSIS — D23.30 BENIGN NEOPLASM OF SKIN OF FACE: ICD-10-CM

## 2018-07-16 DIAGNOSIS — B37.2 INTERTRIGINOUS CANDIDIASIS: ICD-10-CM

## 2018-07-16 DIAGNOSIS — D23.70 BENIGN NEOPLASM OF SKIN OF LOWER LIMB, INCLUDING HIP, UNSPECIFIED LATERALITY: ICD-10-CM

## 2018-07-16 DIAGNOSIS — L82.1 SEBORRHEIC KERATOSES: ICD-10-CM

## 2018-07-16 DIAGNOSIS — D23.60 BENIGN NEOPLASM OF SKIN OF UPPER LIMB, INCLUDING SHOULDER, UNSPECIFIED LATERALITY: ICD-10-CM

## 2018-07-16 DIAGNOSIS — C69.90 OCULAR MELANOMA, UNSPECIFIED LATERALITY (HCC): ICD-10-CM

## 2018-07-16 DIAGNOSIS — D23.4 BENIGN NEOPLASM OF SCALP AND SKIN OF NECK: ICD-10-CM

## 2018-07-16 DIAGNOSIS — L57.0 AK (ACTINIC KERATOSIS): ICD-10-CM

## 2018-07-16 PROCEDURE — G0463 HOSPITAL OUTPT CLINIC VISIT: HCPCS | Performed by: DERMATOLOGY

## 2018-07-16 PROCEDURE — 99213 OFFICE O/P EST LOW 20 MIN: CPT | Performed by: DERMATOLOGY

## 2018-07-19 ENCOUNTER — OFFICE VISIT (OUTPATIENT)
Dept: OTOLARYNGOLOGY | Facility: CLINIC | Age: 73
End: 2018-07-19
Payer: MEDICARE

## 2018-07-19 VITALS
WEIGHT: 190 LBS | TEMPERATURE: 97 F | SYSTOLIC BLOOD PRESSURE: 142 MMHG | DIASTOLIC BLOOD PRESSURE: 80 MMHG | BODY MASS INDEX: 33.66 KG/M2 | HEIGHT: 63 IN

## 2018-07-19 DIAGNOSIS — H61.23 BILATERAL IMPACTED CERUMEN: Primary | ICD-10-CM

## 2018-07-19 PROCEDURE — 69210 REMOVE IMPACTED EAR WAX UNI: CPT | Performed by: OTOLARYNGOLOGY

## 2018-07-19 NOTE — TELEPHONE ENCOUNTER
Cholesterol Medications  Protocol Criteria:  · Appointment scheduled in the past 12 months or in the next 3 months  · ALT & LDL on file in the past 12 months  · ALT result < 80  · LDL result <130   Recent Outpatient Visits            Today Bilateral im

## 2018-07-19 NOTE — PROGRESS NOTES
Linda Caruso is a 75year old female.   Patient presents with:  Ear Problem: cant hear out of right ear for 2 wks, feels clogged      HISTORY OF PRESENT ILLNESS    Patient presents for cerumen removal. No other complaints or concerns at this time    So Unspecified essential hypertension        Past Surgical History:  1996: CHOLECYSTECTOMY  No date: COLONOSCOPY  1992: HYSTERECTOMY  2010: OTHER SURGICAL HISTORY      Comment: Melanoma of Choroid: Radiation plaque for 7                days; treated    REVIEW completed by using acurette and/or suction. Comments: Return to clinic as needed. Avoid q-tips, water precautions and use over the counter wax remedies as needed.       Current Outpatient Prescriptions:   •  Citalopram Hydrobromide 10 MG Oral Tab, Take 1

## 2018-07-20 RX ORDER — PRAVASTATIN SODIUM 20 MG
20 TABLET ORAL
Qty: 90 TABLET | Refills: 1 | Status: SHIPPED | OUTPATIENT
Start: 2018-07-20 | End: 2019-01-10

## 2018-07-29 NOTE — PROGRESS NOTES
Shorty Manuel is a 75year old female. HPI:     CC:  Patient presents with:  Lesion: Pt presents today for 6 month f/u full body exam pt denies something new or changing she personally notcied. Pt with personal hx of melanoma to R eye.          Arleth [place] Succinate ER 25 MG Oral Tablet 24 Hr 1 TAB PO Q D Disp: 90 tablet Rfl: 1   Calcium-Phosphorus-Vitamin D (CITRACAL +D3 OR) Take 1 tablet by mouth daily. Disp:  Rfl:    Vitamin B-12 (VITAMIN B12) 1000 MCG Oral Tab Take 1,000 mcg by mouth daily.  Disp:  Rfl: No    Sexual activity: Not Currently    Partners: Male     Other Topics Concern    Caffeine Concern Yes    Comment: Coffee, 2 cups; per NG    Pt has a pacemaker No    Pt has a defibrillator No    Reaction to local anesthetic No     Social History Narrative exam. pigmented lesions examined with dermoscopy benign-appearing patterns. Waxy tannish keratotic papules scattered, cherry-red vascular papules scattered. See map today's date for lesions noted .       Otherwise remarkable for lesions as noted on m discussed with patient. Therapeutic options reviewed. See  Medications in grid. Instructions reviewed at length. Benign nevi, seborrheic  keratoses, cherry angiomas:  Reassurance regarding other benign skin lesions. Signs and symptoms of skin cancer,

## 2018-08-07 ENCOUNTER — OFFICE VISIT (OUTPATIENT)
Dept: PODIATRY CLINIC | Facility: CLINIC | Age: 73
End: 2018-08-07
Payer: MEDICARE

## 2018-08-07 DIAGNOSIS — B35.1 PAIN DUE TO ONYCHOMYCOSIS OF TOENAIL OF LEFT FOOT: Primary | ICD-10-CM

## 2018-08-07 DIAGNOSIS — M79.674 PAIN DUE TO ONYCHOMYCOSIS OF TOENAIL OF RIGHT FOOT: ICD-10-CM

## 2018-08-07 DIAGNOSIS — B35.1 PAIN DUE TO ONYCHOMYCOSIS OF TOENAIL OF RIGHT FOOT: ICD-10-CM

## 2018-08-07 DIAGNOSIS — M79.675 PAIN DUE TO ONYCHOMYCOSIS OF TOENAIL OF LEFT FOOT: Primary | ICD-10-CM

## 2018-08-07 PROCEDURE — 11721 DEBRIDE NAIL 6 OR MORE: CPT | Performed by: PODIATRIST

## 2018-08-07 NOTE — PROGRESS NOTES
HPI:    Patient ID: Rusty Antoine is a 75year old female. HPI  43-year-old female presents with recurrent pain associated with her toenails. She reports relief by previous treatment.   Review of Systems  I reviewed medical status, medications and a onychomycosis of toenail of left foot  (primary encounter diagnosis)  Pain due to onychomycosis of toenail of right foot    No orders of the defined types were placed in this encounter.       Meds This Visit:  No prescriptions requested or ordered in this e

## 2018-08-08 RX ORDER — METOPROLOL SUCCINATE 25 MG/1
TABLET, EXTENDED RELEASE ORAL
Qty: 90 TABLET | Refills: 1 | Status: SHIPPED | OUTPATIENT
Start: 2018-08-08 | End: 2019-04-30

## 2018-08-09 ENCOUNTER — OFFICE VISIT (OUTPATIENT)
Dept: OPHTHALMOLOGY | Facility: CLINIC | Age: 73
End: 2018-08-09
Payer: MEDICARE

## 2018-08-09 DIAGNOSIS — H25.13 AGE-RELATED NUCLEAR CATARACT OF BOTH EYES: Primary | ICD-10-CM

## 2018-08-09 DIAGNOSIS — C69.91 OCULAR MELANOMA, RIGHT (HCC): ICD-10-CM

## 2018-08-09 PROCEDURE — 92015 DETERMINE REFRACTIVE STATE: CPT | Performed by: OPHTHALMOLOGY

## 2018-08-09 PROCEDURE — 92014 COMPRE OPH EXAM EST PT 1/>: CPT | Performed by: OPHTHALMOLOGY

## 2018-08-09 NOTE — ASSESSMENT & PLAN NOTE
Discussed with patient that there is a significant cataract in the right eye, but due to poor vision secondary to melanoma will not consider cataract surgery unless Dr. Maurizio Gray recommends it.     Patient saw Dr. Maurizio Gray in December of 2017 and he recommended no

## 2018-08-09 NOTE — ASSESSMENT & PLAN NOTE
Diagnosed in 2010 and treated by Dr. Russell Hollins at [facility]. Continue to follow up yearly. Patient last saw Dr. Russell Hollins in December of 2017.

## 2018-08-09 NOTE — PROGRESS NOTES
Jonatan Pizarro is a 75year old female. HPI:     HPI     Pt states that she is still following up with her Retina Specialist Dr. Germain Rivas at [facility] once a year. Pt denies any changes with her vision and is happy with her glasses.      Last edited b 24 Hr TAKE 1 TABLET EVERY DAY Disp: 90 tablet Rfl: 1   Pravastatin Sodium 20 MG Oral Tab Take 1 tablet (20 mg total) by mouth once daily. Disp: 90 tablet Rfl: 1   Citalopram Hydrobromide 10 MG Oral Tab Take 1 tablet (10 mg total) by mouth once daily.  Disp: Lens brunescent cataract with 1-2+ C and 4+ PSC  3-4+ Nuclear sclerosis, Trace Cortical cataract    Vitreous  Vitreous floaters          Fundus Exam       Right Left    Disc x Good rim, Temporal crescent    C/D Ratio x 0.5    Macula red reflex only Normal

## 2018-08-09 NOTE — PATIENT INSTRUCTIONS
Ocular melanoma, right ([facility] [address]  Diagnosed in 2010 and treated by Dr. Haven Sanderson at [facility]. Continue to follow up yearly. Patient last saw Dr. Haven Sanderson in December of 2017.       Age-related nuclear cataract of both eyes  Discussed with patient that there is a signifi

## 2018-10-24 ENCOUNTER — LAB ENCOUNTER (OUTPATIENT)
Dept: LAB | Facility: HOSPITAL | Age: 73
End: 2018-10-24
Attending: INTERNAL MEDICINE
Payer: MEDICARE

## 2018-10-24 DIAGNOSIS — I10 ESSENTIAL HYPERTENSION: ICD-10-CM

## 2018-10-24 PROCEDURE — 80061 LIPID PANEL: CPT

## 2018-10-24 PROCEDURE — 81001 URINALYSIS AUTO W/SCOPE: CPT

## 2018-10-24 PROCEDURE — 36415 COLL VENOUS BLD VENIPUNCTURE: CPT

## 2018-10-24 PROCEDURE — 80053 COMPREHEN METABOLIC PANEL: CPT

## 2018-10-24 PROCEDURE — 85025 COMPLETE CBC W/AUTO DIFF WBC: CPT

## 2018-10-24 PROCEDURE — 84443 ASSAY THYROID STIM HORMONE: CPT

## 2018-10-31 ENCOUNTER — OFFICE VISIT (OUTPATIENT)
Dept: INTERNAL MEDICINE CLINIC | Facility: CLINIC | Age: 73
End: 2018-10-31
Payer: MEDICARE

## 2018-10-31 VITALS
HEART RATE: 65 BPM | BODY MASS INDEX: 33.51 KG/M2 | RESPIRATION RATE: 18 BRPM | HEIGHT: 63 IN | DIASTOLIC BLOOD PRESSURE: 73 MMHG | WEIGHT: 189.13 LBS | SYSTOLIC BLOOD PRESSURE: 126 MMHG | TEMPERATURE: 98 F

## 2018-10-31 DIAGNOSIS — I10 ESSENTIAL HYPERTENSION: ICD-10-CM

## 2018-10-31 DIAGNOSIS — F32.A MILD DEPRESSION: ICD-10-CM

## 2018-10-31 DIAGNOSIS — Z12.31 VISIT FOR SCREENING MAMMOGRAM: ICD-10-CM

## 2018-10-31 DIAGNOSIS — E78.5 HYPERLIPIDEMIA, UNSPECIFIED HYPERLIPIDEMIA TYPE: Primary | ICD-10-CM

## 2018-10-31 PROCEDURE — 99214 OFFICE O/P EST MOD 30 MIN: CPT | Performed by: INTERNAL MEDICINE

## 2018-10-31 PROCEDURE — G0463 HOSPITAL OUTPT CLINIC VISIT: HCPCS | Performed by: INTERNAL MEDICINE

## 2018-10-31 RX ORDER — ASPIRIN 325 MG
162.5 TABLET ORAL
COMMUNITY

## 2018-10-31 RX ORDER — CITALOPRAM 10 MG/1
10 TABLET ORAL
Qty: 90 TABLET | Refills: 1 | Status: SHIPPED | OUTPATIENT
Start: 2018-10-31 | End: 2019-03-27

## 2018-10-31 NOTE — PATIENT INSTRUCTIONS
Problem List Items Addressed This Visit        Unprioritized    Essential hypertension     Blood pressure 126/73, pulse 65, temperature 98 °F (36.7 °C), temperature source Oral, resp.  rate 18, height 5' 3\" (1.6 m), weight 189 lb 1.6 oz (85.8 kg), not curr

## 2018-10-31 NOTE — ASSESSMENT & PLAN NOTE
Blood pressure 126/73, pulse 65, temperature 98 °F (36.7 °C), temperature source Oral, resp. rate 18, height 5' 3\" (1.6 m), weight 189 lb 1.6 oz (85.8 kg), not currently breastfeeding. Stable blood pressure upon recheck.   Continue on metoprolol at 25 mg

## 2018-10-31 NOTE — ASSESSMENT & PLAN NOTE
Patient has been on pravastatin at 20 mg daily. She has had fluctuating triglycerides. She has been under significant amount of stress at this time and hence advised to watch the diet for sugars, starches, fatty foods and desserts.   Recheck labs in about

## 2018-10-31 NOTE — PROGRESS NOTES
HPI:    Patient ID: Shorty Manuel is a 75year old female. Labs discussed  mammogram and carotid Doppler pending . Hypertension   This is a chronic problem. The current episode started more than 1 year ago.  The problem has been gradually improv Neurological: Negative. Hematological: Negative. Psychiatric/Behavioral: Positive for behavioral problems, decreased concentration and dysphoric mood. Current Outpatient Medications:  aspirin 325 MG Oral Tab Take 325 mg by mouth.  Disp: thyromegaly present. Cardiovascular: Normal rate, regular rhythm, normal heart sounds and intact distal pulses. No murmur heard. Pulmonary/Chest: Effort normal and breath sounds normal. She has no wheezes. She has no rales. She exhibits no tenderness. fluctuating triglycerides. She has been under significant amount of stress at this time and hence advised to watch the diet for sugars, starches, fatty foods and desserts. Recheck labs in about 6 months and follow-up.            Relevant Orders    COMP ME

## 2018-12-11 ENCOUNTER — OFFICE VISIT (OUTPATIENT)
Dept: PODIATRY CLINIC | Facility: CLINIC | Age: 73
End: 2018-12-11
Payer: MEDICARE

## 2018-12-11 DIAGNOSIS — B35.1 PAIN DUE TO ONYCHOMYCOSIS OF TOENAIL OF RIGHT FOOT: ICD-10-CM

## 2018-12-11 DIAGNOSIS — M79.675 PAIN DUE TO ONYCHOMYCOSIS OF TOENAIL OF LEFT FOOT: Primary | ICD-10-CM

## 2018-12-11 DIAGNOSIS — M79.674 PAIN DUE TO ONYCHOMYCOSIS OF TOENAIL OF RIGHT FOOT: ICD-10-CM

## 2018-12-11 DIAGNOSIS — B35.1 PAIN DUE TO ONYCHOMYCOSIS OF TOENAIL OF LEFT FOOT: Primary | ICD-10-CM

## 2018-12-11 PROCEDURE — 11721 DEBRIDE NAIL 6 OR MORE: CPT | Performed by: PODIATRIST

## 2018-12-11 NOTE — PROGRESS NOTES
HPI:    Patient ID: June Kee is a 75year old female. Old female presents with recurrent pain associated with her nails. She reports relief by previous treatment.       ROS:     I did review medical status medications and allergy was noted and when symptoms redevelop           ASSESSMENT/PLAN:   Pain due to onychomycosis of toenail of left foot  (primary encounter diagnosis)  Pain due to onychomycosis of toenail of right foot    No orders of the defined types were placed in this encounter.

## 2019-01-10 RX ORDER — PRAVASTATIN SODIUM 20 MG
TABLET ORAL
Qty: 90 TABLET | Refills: 1 | Status: SHIPPED | OUTPATIENT
Start: 2019-01-10 | End: 2019-04-30

## 2019-01-11 NOTE — TELEPHONE ENCOUNTER
Cholesterol Medications  Protocol Criteria:  · Appointment scheduled in the past 12 months or in the next 3 months  · ALT & LDL on file in the past 12 months  · ALT result < 80  · LDL result <130   Recent Outpatient Visits            1 month ago Pain due t BP Readings from Last 2 Encounters:  10/31/18 : 126/73  07/19/18 : 142/80    LR 7-20-18 # 90 + 1    Med refilled per protocol.         Cholesterol Medication Protocol Passed1/10 5:06 PM   ALT in past 12 months    LDL in past 12 months    Last ALT < 80    La

## 2019-02-07 ENCOUNTER — OFFICE VISIT (OUTPATIENT)
Dept: DERMATOLOGY CLINIC | Facility: CLINIC | Age: 74
End: 2019-02-07
Payer: MEDICARE

## 2019-02-07 DIAGNOSIS — L72.0 EPIDERMAL CYST: ICD-10-CM

## 2019-02-07 DIAGNOSIS — D22.9 MULTIPLE NEVI: ICD-10-CM

## 2019-02-07 DIAGNOSIS — D23.60 BENIGN NEOPLASM OF SKIN OF UPPER LIMB, INCLUDING SHOULDER, UNSPECIFIED LATERALITY: ICD-10-CM

## 2019-02-07 DIAGNOSIS — D23.30 BENIGN NEOPLASM OF SKIN OF FACE: ICD-10-CM

## 2019-02-07 DIAGNOSIS — L57.0 AK (ACTINIC KERATOSIS): ICD-10-CM

## 2019-02-07 DIAGNOSIS — D23.4 BENIGN NEOPLASM OF SCALP AND SKIN OF NECK: ICD-10-CM

## 2019-02-07 DIAGNOSIS — B37.2 INTERTRIGINOUS CANDIDIASIS: ICD-10-CM

## 2019-02-07 DIAGNOSIS — D23.70 BENIGN NEOPLASM OF SKIN OF LOWER LIMB, INCLUDING HIP, UNSPECIFIED LATERALITY: ICD-10-CM

## 2019-02-07 DIAGNOSIS — L82.1 SEBORRHEIC KERATOSES: Primary | ICD-10-CM

## 2019-02-07 DIAGNOSIS — D23.5 BENIGN NEOPLASM OF SKIN OF TRUNK, EXCEPT SCROTUM: ICD-10-CM

## 2019-02-07 DIAGNOSIS — C69.90 OCULAR MELANOMA, UNSPECIFIED LATERALITY (HCC): ICD-10-CM

## 2019-02-07 PROCEDURE — 99213 OFFICE O/P EST LOW 20 MIN: CPT | Performed by: DERMATOLOGY

## 2019-02-07 PROCEDURE — G0463 HOSPITAL OUTPT CLINIC VISIT: HCPCS | Performed by: DERMATOLOGY

## 2019-02-18 NOTE — PROGRESS NOTES
Jonatan Pizarro is a 75year old female. HPI:     CC:  Patient presents with:  Full Skin Exam: LOV 7/2018. Pt here for full skin exam. Concerned with lesion on right tunk that bled once when she got out of the shower. Denies family hx of skin cancer.  Pe Medications:  PRAVASTATIN SODIUM 20 MG Oral Tab TAKE 1 TABLET ONE TIME DAILY Disp: 90 tablet Rfl: 1   aspirin 325 MG Oral Tab Take 325 mg by mouth. Disp:  Rfl:    Citalopram Hydrobromide 10 MG Oral Tab Take 1 tablet (10 mg total) by mouth once daily.  Disp: level: Not on file    Occupational History      Not on file    Social Needs      Financial resource strain: Not on file      Food insecurity:        Worry: Not on file        Inability: Not on file      Transportation needs:        Medical: Not on file Asked        Pt has a pacemaker: No        Pt has a defibrillator: No        Breast feeding: Not Asked        Reaction to local anesthetic: No    Social History Narrative      Not on file    Family History   Problem Relation Age of Onset   • Heart Attack F pigmented lesions examined with dermoscopy benign-appearing patterns. Waxy tannish keratotic papules scattered, cherry-red vascular papules scattered. See map today's date for lesions noted . Otherwise remarkable for lesions as noted on map.   Rita Agrawal recheck 6 months    Please refer to map for specific lesions. See additional diagnoses. Pros cons of various therapies, risks benefits discussed. Pathophysiology discussed with patient. Therapeutic options reviewed. See  Medications in grid.   Instructio

## 2019-03-27 RX ORDER — CITALOPRAM 10 MG/1
10 TABLET ORAL
Qty: 90 TABLET | Refills: 0 | Status: SHIPPED | OUTPATIENT
Start: 2019-03-27 | End: 2019-04-30

## 2019-03-28 NOTE — TELEPHONE ENCOUNTER
Refill passed per [facility] protocol.   Refill Protocol Appointment Criteria  · Appointment scheduled in the past 6 months or in the next 3 months  Recent Outpatient Visits            1 month ago Seborrheic keratoses    New Preeti

## 2019-04-22 ENCOUNTER — HOSPITAL ENCOUNTER (OUTPATIENT)
Dept: MAMMOGRAPHY | Age: 74
Discharge: HOME | End: 2019-04-22
Attending: INTERNAL MEDICINE
Payer: MEDICARE

## 2019-04-22 DIAGNOSIS — Z12.31 VISIT FOR SCREENING MAMMOGRAM: ICD-10-CM

## 2019-04-22 PROCEDURE — 77067 SCR MAMMO BI INCL CAD: CPT | Performed by: INTERNAL MEDICINE

## 2019-04-22 PROCEDURE — 77063 BREAST TOMOSYNTHESIS BI: CPT | Performed by: INTERNAL MEDICINE

## 2019-04-24 ENCOUNTER — OFFICE VISIT (OUTPATIENT)
Dept: PODIATRY CLINIC | Facility: CLINIC | Age: 74
End: 2019-04-24
Payer: MEDICARE

## 2019-04-24 ENCOUNTER — LAB ENCOUNTER (OUTPATIENT)
Dept: LAB | Facility: HOSPITAL | Age: 74
End: 2019-04-24
Attending: INTERNAL MEDICINE
Payer: MEDICARE

## 2019-04-24 ENCOUNTER — HOSPITAL ENCOUNTER (OUTPATIENT)
Dept: ULTRASOUND IMAGING | Facility: HOSPITAL | Age: 74
Discharge: HOME | End: 2019-04-24
Attending: INTERNAL MEDICINE | Admitting: PODIATRIST
Payer: MEDICARE

## 2019-04-24 DIAGNOSIS — R09.89 LEFT CAROTID BRUIT: ICD-10-CM

## 2019-04-24 DIAGNOSIS — M79.675 PAIN DUE TO ONYCHOMYCOSIS OF TOENAIL OF LEFT FOOT: Primary | ICD-10-CM

## 2019-04-24 DIAGNOSIS — B35.1 PAIN DUE TO ONYCHOMYCOSIS OF TOENAIL OF LEFT FOOT: Primary | ICD-10-CM

## 2019-04-24 DIAGNOSIS — E78.5 HYPERLIPIDEMIA, UNSPECIFIED HYPERLIPIDEMIA TYPE: ICD-10-CM

## 2019-04-24 DIAGNOSIS — M79.674 PAIN DUE TO ONYCHOMYCOSIS OF TOENAIL OF RIGHT FOOT: ICD-10-CM

## 2019-04-24 DIAGNOSIS — Z98.890 H/O CAROTID ENDARTERECTOMY: ICD-10-CM

## 2019-04-24 DIAGNOSIS — B35.1 PAIN DUE TO ONYCHOMYCOSIS OF TOENAIL OF RIGHT FOOT: ICD-10-CM

## 2019-04-24 DIAGNOSIS — I10 ESSENTIAL HYPERTENSION: ICD-10-CM

## 2019-04-24 PROCEDURE — 11721 DEBRIDE NAIL 6 OR MORE: CPT | Performed by: PODIATRIST

## 2019-04-24 PROCEDURE — 84443 ASSAY THYROID STIM HORMONE: CPT

## 2019-04-24 PROCEDURE — 93880 EXTRACRANIAL BILAT STUDY: CPT | Performed by: INTERNAL MEDICINE

## 2019-04-24 PROCEDURE — 85025 COMPLETE CBC W/AUTO DIFF WBC: CPT

## 2019-04-24 PROCEDURE — 36415 COLL VENOUS BLD VENIPUNCTURE: CPT

## 2019-04-24 PROCEDURE — 81001 URINALYSIS AUTO W/SCOPE: CPT

## 2019-04-24 PROCEDURE — 80053 COMPREHEN METABOLIC PANEL: CPT

## 2019-04-24 PROCEDURE — 80061 LIPID PANEL: CPT

## 2019-04-24 NOTE — PROGRESS NOTES
HPI:    Patient ID: Shanika Bloom is a 75year old female. This pleasant 53-year-old female presents to the office today with the same recurrent condition associated with her toenails.   I saw her in December 2018 and the treatment provided her with debridement.   I anticipate relief by this care and will see patient for treatment if and when symptoms redevelop         ASSESSMENT/PLAN:   Pain due to onychomycosis of toenail of left foot  (primary encounter diagnosis)  Pain due to onychomycosis of toena

## 2019-04-30 ENCOUNTER — OFFICE VISIT (OUTPATIENT)
Dept: INTERNAL MEDICINE CLINIC | Facility: CLINIC | Age: 74
End: 2019-04-30
Payer: MEDICARE

## 2019-04-30 VITALS
RESPIRATION RATE: 16 BRPM | DIASTOLIC BLOOD PRESSURE: 84 MMHG | HEIGHT: 63 IN | HEART RATE: 71 BPM | SYSTOLIC BLOOD PRESSURE: 136 MMHG | BODY MASS INDEX: 34.02 KG/M2 | WEIGHT: 192 LBS

## 2019-04-30 DIAGNOSIS — E78.5 HYPERLIPIDEMIA, UNSPECIFIED HYPERLIPIDEMIA TYPE: Primary | ICD-10-CM

## 2019-04-30 DIAGNOSIS — Z98.890 H/O CAROTID ENDARTERECTOMY: ICD-10-CM

## 2019-04-30 DIAGNOSIS — N18.30 CKD (CHRONIC KIDNEY DISEASE) STAGE 3, GFR 30-59 ML/MIN (HCC): ICD-10-CM

## 2019-04-30 DIAGNOSIS — F32.A MILD DEPRESSION: ICD-10-CM

## 2019-04-30 DIAGNOSIS — I10 ESSENTIAL HYPERTENSION: ICD-10-CM

## 2019-04-30 PROCEDURE — 99214 OFFICE O/P EST MOD 30 MIN: CPT | Performed by: INTERNAL MEDICINE

## 2019-04-30 PROCEDURE — G0463 HOSPITAL OUTPT CLINIC VISIT: HCPCS | Performed by: INTERNAL MEDICINE

## 2019-04-30 RX ORDER — CITALOPRAM 10 MG/1
10 TABLET ORAL
Qty: 90 TABLET | Refills: 2 | Status: SHIPPED | OUTPATIENT
Start: 2019-04-30 | End: 2019-10-30

## 2019-04-30 RX ORDER — METOPROLOL SUCCINATE 25 MG/1
25 TABLET, EXTENDED RELEASE ORAL
Qty: 90 TABLET | Refills: 2 | Status: SHIPPED | OUTPATIENT
Start: 2019-04-30 | End: 2019-10-30

## 2019-04-30 RX ORDER — PRAVASTATIN SODIUM 20 MG
TABLET ORAL
Qty: 90 TABLET | Refills: 2 | Status: SHIPPED | OUTPATIENT
Start: 2019-04-30 | End: 2019-10-30

## 2019-04-30 NOTE — ASSESSMENT & PLAN NOTE
Chronic mild depressive disorder with anxiety–seems improved on citalopram  Continue the same dose of medications

## 2019-04-30 NOTE — ASSESSMENT & PLAN NOTE
Lipid panel and liver function tests look stable on pravastatin at 20 mg daily. She has been tolerating her medications well at this time. Continue the same dose of medications, recheck labs in 6 months and will follow-up at that time.

## 2019-04-30 NOTE — ASSESSMENT & PLAN NOTE
Blood pressure 136/84, pulse 71, resp. rate 16, height 5' 3\" (1.6 m), weight 192 lb (87.1 kg), not currently breastfeeding. Blood pressure upon recheck is much improved. Continue on metoprolol at 25 mg daily.   No chest pain, palpitations or shortness

## 2019-04-30 NOTE — ASSESSMENT & PLAN NOTE
Patient with a history of right carotid endarterectomy. She did have a left carotid bruit–ultrasound of the carotids does not show any significant obstruction on the left side.   The right carotid endarterectomy seems normal.  Follow-up evaluation in about

## 2019-04-30 NOTE — PATIENT INSTRUCTIONS
Problem List Items Addressed This Visit        Unprioritized    CKD (chronic kidney disease) stage 3, GFR 30-59 ml/min ([facility])     Stage III chronic kidney disease most likely related to hypertension. Blood pressures are stable at this time.   Advised to dri Medications    Citalopram Hydrobromide 10 MG Oral Tab

## 2019-04-30 NOTE — PROGRESS NOTES
HPI:    Patient ID: Zeenat Choudhary is a 75year old female. Mammogram looked normal.  Carotid looks stable. Labs look stable except egfr is low    Hypertension   This is a chronic problem. The current episode started more than 1 year ago.  The proble awakenings: several  Compliance with medications:  %  Side effects:  Weight gain      Review of Systems   Constitutional: Positive for weight gain. HENT: Negative. Eyes: Negative. Respiratory: Negative.     Cardiovascular: Positive for palpita well-nourished. HENT:   Right Ear: External ear normal.   Left Ear: External ear normal.   Nose: Nose normal.   Mouth/Throat: Oropharynx is clear and moist. No oropharyngeal exudate. Eyes: Pupils are equal, round, and reactive to light.  Conjunctivae an evaluation in about 1 year         Essential hypertension     Blood pressure 136/84, pulse 71, resp. rate 16, height 5' 3\" (1.6 m), weight 192 lb (87.1 kg), not currently breastfeeding. Blood pressure upon recheck is much improved.   Continue on metopr • Citalopram Hydrobromide 10 MG Oral Tab 90 tablet 2     Sig: Take 1 tablet (10 mg total) by mouth once daily.    • Pravastatin Sodium 20 MG Oral Tab 90 tablet 2     Sig: TAKE 1 TABLET ONE TIME DAILY       Imaging & Referrals:  None       NQ#[id number]

## 2019-07-16 ENCOUNTER — OFFICE VISIT (OUTPATIENT)
Dept: OTOLARYNGOLOGY | Facility: CLINIC | Age: 74
End: 2019-07-16
Payer: MEDICARE

## 2019-07-16 VITALS
WEIGHT: 190 LBS | DIASTOLIC BLOOD PRESSURE: 84 MMHG | BODY MASS INDEX: 33.66 KG/M2 | HEIGHT: 63 IN | SYSTOLIC BLOOD PRESSURE: 174 MMHG | TEMPERATURE: 98 F

## 2019-07-16 DIAGNOSIS — H61.23 BILATERAL IMPACTED CERUMEN: Primary | ICD-10-CM

## 2019-07-16 PROCEDURE — 69210 REMOVE IMPACTED EAR WAX UNI: CPT | Performed by: OTOLARYNGOLOGY

## 2019-07-16 NOTE — PROGRESS NOTES
Glenroy Charles is a 75year old female.   Patient presents with:  Ear Wax: bilateral ear cleaning       HISTORY OF PRESENT ILLNESS    Patient presents for cerumen removal. No other complaints or concerns at this time    Social History    Socioeconomic Hi due to melanoma/ radiation\"   • Unspecified essential hypertension        Past Surgical History:   Procedure Laterality Date   • CHOLECYSTECTOMY  1996   • COLONOSCOPY     • HYSTERECTOMY  1992   • OTHER SURGICAL HISTORY  2010    Melanoma of Choroid: Radiat acurette and/or suction. Comments: Return to clinic as needed. Avoid q-tips, water precautions and use over the counter wax remedies as needed.       Current Outpatient Medications:   •  Vitamin Mixture (VITAMIN E COMPLETE OR), Take 1 tablet by mouth., D

## 2019-08-15 ENCOUNTER — OFFICE VISIT (OUTPATIENT)
Dept: DERMATOLOGY CLINIC | Facility: CLINIC | Age: 74
End: 2019-08-15
Payer: MEDICARE

## 2019-08-15 DIAGNOSIS — D23.70 BENIGN NEOPLASM OF SKIN OF LOWER LIMB, INCLUDING HIP, UNSPECIFIED LATERALITY: ICD-10-CM

## 2019-08-15 DIAGNOSIS — B37.2 INTERTRIGINOUS CANDIDIASIS: ICD-10-CM

## 2019-08-15 DIAGNOSIS — D23.30 BENIGN NEOPLASM OF SKIN OF FACE: ICD-10-CM

## 2019-08-15 DIAGNOSIS — D23.5 BENIGN NEOPLASM OF SKIN OF TRUNK, EXCEPT SCROTUM: ICD-10-CM

## 2019-08-15 DIAGNOSIS — L57.0 AK (ACTINIC KERATOSIS): ICD-10-CM

## 2019-08-15 DIAGNOSIS — D22.9 MULTIPLE NEVI: ICD-10-CM

## 2019-08-15 DIAGNOSIS — C69.90 OCULAR MELANOMA, UNSPECIFIED LATERALITY (HCC): Primary | ICD-10-CM

## 2019-08-15 DIAGNOSIS — D23.60 BENIGN NEOPLASM OF SKIN OF UPPER LIMB, INCLUDING SHOULDER, UNSPECIFIED LATERALITY: ICD-10-CM

## 2019-08-15 DIAGNOSIS — D23.4 BENIGN NEOPLASM OF SCALP AND SKIN OF NECK: ICD-10-CM

## 2019-08-15 DIAGNOSIS — L82.1 SEBORRHEIC KERATOSES: ICD-10-CM

## 2019-08-15 PROCEDURE — G0463 HOSPITAL OUTPT CLINIC VISIT: HCPCS | Performed by: DERMATOLOGY

## 2019-08-15 PROCEDURE — 99213 OFFICE O/P EST LOW 20 MIN: CPT | Performed by: DERMATOLOGY

## 2019-08-16 ENCOUNTER — OFFICE VISIT (OUTPATIENT)
Dept: PODIATRY CLINIC | Facility: CLINIC | Age: 74
End: 2019-08-16
Payer: MEDICARE

## 2019-08-16 VITALS — BODY MASS INDEX: 33.66 KG/M2 | WEIGHT: 190 LBS | HEIGHT: 63 IN

## 2019-08-16 DIAGNOSIS — M79.674 PAIN DUE TO ONYCHOMYCOSIS OF TOENAIL OF RIGHT FOOT: ICD-10-CM

## 2019-08-16 DIAGNOSIS — B35.1 PAIN DUE TO ONYCHOMYCOSIS OF TOENAIL OF RIGHT FOOT: ICD-10-CM

## 2019-08-16 DIAGNOSIS — M79.675 PAIN DUE TO ONYCHOMYCOSIS OF TOENAIL OF LEFT FOOT: Primary | ICD-10-CM

## 2019-08-16 DIAGNOSIS — B35.1 PAIN DUE TO ONYCHOMYCOSIS OF TOENAIL OF LEFT FOOT: Primary | ICD-10-CM

## 2019-08-16 PROCEDURE — 11721 DEBRIDE NAIL 6 OR MORE: CPT | Performed by: PODIATRIST

## 2019-08-16 NOTE — PROGRESS NOTES
HPI:    Patient ID: Diamond Farris is a 75year old female. This 44-year-old female presents with recurrent pain associated with her toenails. Patient's had relief by previous treatment.       ROS:       I did review medical status, medications were no nail, subungual debris, and some keratosis. No ulcerations or infections were noted upon debridement.   I anticipate relief and will see patient for care if and when symptoms redevelop         ASSESSMENT/PLAN:   Pain due to onychomycosis of toenail of left

## 2019-08-25 NOTE — PROGRESS NOTES
Diamond Farris is a 75year old female. HPI:     CC:  Patient presents with:  Full Skin Exam: LOV 2/7/19. pt presenting with 6 month full body skin check. pesonal HX  fo Melanoma.         Allergies:  Adhesive Tape    HISTORY:    Past Medical History: mg total) by mouth once daily. Disp: 90 tablet Rfl: 2   Citalopram Hydrobromide 10 MG Oral Tab Take 1 tablet (10 mg total) by mouth once daily.  Disp: 90 tablet Rfl: 2   Pravastatin Sodium 20 MG Oral Tab TAKE 1 TABLET ONE TIME DAILY Disp: 90 tablet Rfl: 2 file    Social Needs      Financial resource strain: Not on file      Food insecurity:        Worry: Not on file        Inability: Not on file      Transportation needs:        Medical: Not on file        Non-medical: Not on file    Tobacco Use      Leeannin has a defibrillator: No        Breast feeding: Not Asked        Reaction to local anesthetic: No    Social History Narrative      Not on file    Family History   Problem Relation Age of Onset   • Heart Attack Father 47        MI; per NG   • Stroke Mother 9 scattered, cherry-red vascular papules scattered. See map today's date for lesions noted . Otherwise remarkable for lesions as noted on map.   See details of examination  See Assessment /Plan for additional history and physical exam also:    Assessm various therapies, risks benefits discussed. Pathophysiology discussed with patient. Therapeutic options reviewed. See  Medications in grid. Instructions reviewed at length.     Benign nevi, seborrheic  keratoses, cherry angiomas:  Reassurance regarding o

## 2019-10-09 ENCOUNTER — OFFICE VISIT (OUTPATIENT)
Dept: OPHTHALMOLOGY | Facility: CLINIC | Age: 74
End: 2019-10-09
Payer: MEDICARE

## 2019-10-09 DIAGNOSIS — C69.91 OCULAR MELANOMA, RIGHT (HCC): ICD-10-CM

## 2019-10-09 DIAGNOSIS — H25.13 AGE-RELATED NUCLEAR CATARACT OF BOTH EYES: Primary | ICD-10-CM

## 2019-10-09 DIAGNOSIS — H43.392 FLOATERS, LEFT: ICD-10-CM

## 2019-10-09 PROCEDURE — 92014 COMPRE OPH EXAM EST PT 1/>: CPT | Performed by: OPHTHALMOLOGY

## 2019-10-09 NOTE — PATIENT INSTRUCTIONS
Age-related nuclear cataract of both eyes  Discussed with patient that there is a significant cataract in the right eye, but due to poor vision secondary to melanoma will not consider cataract surgery unless Dr. Mindy Austin recommends it.     Patient saw Dr. Bob Hurst

## 2019-10-09 NOTE — PROGRESS NOTES
Jonatan Pizarro is a 75year old female. HPI:     HPI     Patient is here for a complete exam.  She states her vision is stable. She saw Dr. Germain Rivas on December 20 2018 she will follow-up with him in December 2019.       Last edited by Maximiano Almas, Madeleine Brunner Mixture (VITAMIN E COMPLETE OR) Take 1 tablet by mouth. Disp:  Rfl:    Metoprolol Succinate ER 25 MG Oral Tablet 24 Hr Take 1 tablet (25 mg total) by mouth once daily.  Disp: 90 tablet Rfl: 2   Citalopram Hydrobromide 10 MG Oral Tab Take 1 tablet (10 mg tot Cornea Clear Clear    Anterior Chamber Deep and quiet Deep and quiet    Iris Normal Normal    Lens brunescent cataract with 1-2+ C and 4+ PSC  3-4+ Nuclear sclerosis, Trace Cortical cataract    Vitreous  Vitreous floaters          Fundus Exam       Righ

## 2019-10-09 NOTE — ASSESSMENT & PLAN NOTE
Diagnosed in 2010 and treated by Dr. Courtney Segundo at [facility]. Continue to follow up yearly. Patient last saw Dr. Courtney Segundo in December of 2018. Going forward, we will follow the patient in June of 2020 and she will see Dr. Courtney Segundo in December of 2019.

## 2019-10-09 NOTE — ASSESSMENT & PLAN NOTE
Discussed with patient that there is a significant cataract in the right eye, but due to poor vision secondary to melanoma will not consider cataract surgery unless Dr. Renny Lund recommends it.     Patient saw Dr. Renny Lund in December of 2018 and he recommended no

## 2019-10-21 ENCOUNTER — LAB ENCOUNTER (OUTPATIENT)
Dept: LAB | Facility: HOSPITAL | Age: 74
End: 2019-10-21
Attending: INTERNAL MEDICINE
Payer: MEDICARE

## 2019-10-21 DIAGNOSIS — I10 ESSENTIAL HYPERTENSION: ICD-10-CM

## 2019-10-21 PROCEDURE — 81001 URINALYSIS AUTO W/SCOPE: CPT

## 2019-10-21 PROCEDURE — 85025 COMPLETE CBC W/AUTO DIFF WBC: CPT

## 2019-10-21 PROCEDURE — 80061 LIPID PANEL: CPT

## 2019-10-21 PROCEDURE — 84443 ASSAY THYROID STIM HORMONE: CPT

## 2019-10-21 PROCEDURE — 80053 COMPREHEN METABOLIC PANEL: CPT

## 2019-10-21 PROCEDURE — 36415 COLL VENOUS BLD VENIPUNCTURE: CPT

## 2019-10-30 ENCOUNTER — OFFICE VISIT (OUTPATIENT)
Dept: INTERNAL MEDICINE CLINIC | Facility: CLINIC | Age: 74
End: 2019-10-30
Payer: MEDICARE

## 2019-10-30 VITALS
SYSTOLIC BLOOD PRESSURE: 136 MMHG | WEIGHT: 181.81 LBS | DIASTOLIC BLOOD PRESSURE: 72 MMHG | RESPIRATION RATE: 22 BRPM | BODY MASS INDEX: 32.21 KG/M2 | HEART RATE: 73 BPM | TEMPERATURE: 98 F | OXYGEN SATURATION: 97 % | HEIGHT: 63 IN

## 2019-10-30 DIAGNOSIS — Z78.0 MENOPAUSE: ICD-10-CM

## 2019-10-30 DIAGNOSIS — N18.30 CKD (CHRONIC KIDNEY DISEASE) STAGE 3, GFR 30-59 ML/MIN (HCC): Primary | ICD-10-CM

## 2019-10-30 DIAGNOSIS — Z12.31 VISIT FOR SCREENING MAMMOGRAM: ICD-10-CM

## 2019-10-30 DIAGNOSIS — E78.5 HYPERLIPIDEMIA, UNSPECIFIED HYPERLIPIDEMIA TYPE: ICD-10-CM

## 2019-10-30 DIAGNOSIS — I10 ESSENTIAL HYPERTENSION: ICD-10-CM

## 2019-10-30 DIAGNOSIS — F32.A MILD DEPRESSION: ICD-10-CM

## 2019-10-30 PROCEDURE — 99214 OFFICE O/P EST MOD 30 MIN: CPT | Performed by: INTERNAL MEDICINE

## 2019-10-30 PROCEDURE — G0463 HOSPITAL OUTPT CLINIC VISIT: HCPCS | Performed by: INTERNAL MEDICINE

## 2019-10-30 RX ORDER — PRAVASTATIN SODIUM 20 MG
TABLET ORAL
Qty: 90 TABLET | Refills: 2 | Status: SHIPPED | OUTPATIENT
Start: 2019-10-30 | End: 2020-04-03

## 2019-10-30 RX ORDER — METOPROLOL SUCCINATE 25 MG/1
25 TABLET, EXTENDED RELEASE ORAL
Qty: 90 TABLET | Refills: 2 | Status: SHIPPED | OUTPATIENT
Start: 2019-10-30 | End: 2020-04-03

## 2019-10-30 RX ORDER — CITALOPRAM 10 MG/1
10 TABLET ORAL
Qty: 90 TABLET | Refills: 2 | Status: SHIPPED | OUTPATIENT
Start: 2019-10-30 | End: 2020-04-03

## 2019-10-30 NOTE — PROGRESS NOTES
HPI:    Patient ID: Rusty Antoine is a 75year old female.     Labs discussed      Immunization History  Administered            Date(s) Administered    FLU VACC High Dose 72 YRS & Older PRSV Free (60919)                          10/01/2014  10/01/2016 Compliance problems include adherence to diet and adherence to exercise. Risk factors for coronary artery disease include obesity, hypertension, post-menopausal, a sedentary lifestyle, stress and dyslipidemia.    Depression   Visit Type: follow-up  Patient Take 1,000 mcg by mouth daily. • loratadine (CLARITIN) 10 MG Oral Tab Take 10 mg by mouth daily. • folic acid (FOLVITE) 651 MCG Oral Tab Take 400 mcg by mouth daily. • magnesium 250 MG Oral Tab Take 250 mg by mouth daily.      • Acetaminophen (T normal. Judgment normal. Her mood appears anxious. Her affect is labile. Thought content is not paranoid and not delusional. Cognition and memory are normal. She exhibits a depressed mood. She expresses no homicidal and no suicidal ideation.  She expresses to drink plenty of fluids to keep hydrated and continue to avoid medications like Advil, Aleve, ibuprofen like medications. Reduce salt in the diet.            Other Visit Diagnoses     Visit for screening mammogram        Relevant Orders    [facility] DARYL 2D+3D

## 2019-10-30 NOTE — ASSESSMENT & PLAN NOTE
Blood pressure 136/72, pulse 73, temperature 98.1 °F (36.7 °C), temperature source Oral, resp. rate 22, height 5' 3\" (1.6 m), weight 181 lb 12.8 oz (82.5 kg), SpO2 97 %, not currently breastfeeding. Blood pressure upon recheck is much improved.   Ad Mohamud

## 2019-10-30 NOTE — PATIENT INSTRUCTIONS
Problem List Items Addressed This Visit        Unprioritized    CKD (chronic kidney disease) stage 3, GFR 30-59 ml/min (HCC) - Primary     Persistent stage III CKD but EGFR has improved.   Advised to drink plenty of fluids to keep hydrated and continue to a

## 2019-10-30 NOTE — ASSESSMENT & PLAN NOTE
Persistent stage III CKD but EGFR has improved. Advised to drink plenty of fluids to keep hydrated and continue to avoid medications like Advil, Aleve, ibuprofen like medications. Reduce salt in the diet.

## 2019-11-19 ENCOUNTER — OFFICE VISIT (OUTPATIENT)
Dept: PODIATRY CLINIC | Facility: CLINIC | Age: 74
End: 2019-11-19
Payer: MEDICARE

## 2019-11-19 DIAGNOSIS — B35.1 PAIN DUE TO ONYCHOMYCOSIS OF TOENAIL OF LEFT FOOT: ICD-10-CM

## 2019-11-19 DIAGNOSIS — M79.674 PAIN DUE TO ONYCHOMYCOSIS OF TOENAIL OF RIGHT FOOT: Primary | ICD-10-CM

## 2019-11-19 DIAGNOSIS — B35.1 PAIN DUE TO ONYCHOMYCOSIS OF TOENAIL OF RIGHT FOOT: Primary | ICD-10-CM

## 2019-11-19 DIAGNOSIS — M79.675 PAIN DUE TO ONYCHOMYCOSIS OF TOENAIL OF LEFT FOOT: ICD-10-CM

## 2019-11-19 PROCEDURE — 11721 DEBRIDE NAIL 6 OR MORE: CPT | Performed by: PODIATRIST

## 2019-11-19 NOTE — PROGRESS NOTES
HPI:    Patient ID: Linda Caruso is a 75year old female. This 65-year-old female presents with recurrent pain associated with her nails. Patient reports relief by previous care.     ROS:     I did review medical status, medications and allergies w will see patient for care if and when symptoms redevelop         ASSESSMENT/PLAN:   Pain due to onychomycosis of toenail of right foot  (primary encounter diagnosis)  Pain due to onychomycosis of toenail of left foot    No orders of the defined types were

## 2020-01-16 ENCOUNTER — OFFICE VISIT (OUTPATIENT)
Dept: DERMATOLOGY CLINIC | Facility: CLINIC | Age: 75
End: 2020-01-16
Payer: MEDICARE

## 2020-01-16 DIAGNOSIS — D23.70 BENIGN NEOPLASM OF SKIN OF LOWER LIMB, INCLUDING HIP, UNSPECIFIED LATERALITY: ICD-10-CM

## 2020-01-16 DIAGNOSIS — D23.60 BENIGN NEOPLASM OF SKIN OF UPPER LIMB, INCLUDING SHOULDER, UNSPECIFIED LATERALITY: ICD-10-CM

## 2020-01-16 DIAGNOSIS — D23.5 BENIGN NEOPLASM OF SKIN OF TRUNK, EXCEPT SCROTUM: ICD-10-CM

## 2020-01-16 DIAGNOSIS — C69.90 OCULAR MELANOMA, UNSPECIFIED LATERALITY (HCC): ICD-10-CM

## 2020-01-16 DIAGNOSIS — D22.9 MULTIPLE NEVI: Primary | ICD-10-CM

## 2020-01-16 DIAGNOSIS — D23.30 BENIGN NEOPLASM OF SKIN OF FACE: ICD-10-CM

## 2020-01-16 DIAGNOSIS — L82.1 SEBORRHEIC KERATOSES: ICD-10-CM

## 2020-01-16 DIAGNOSIS — D23.4 BENIGN NEOPLASM OF SCALP AND SKIN OF NECK: ICD-10-CM

## 2020-01-16 PROCEDURE — G0463 HOSPITAL OUTPT CLINIC VISIT: HCPCS | Performed by: DERMATOLOGY

## 2020-01-16 PROCEDURE — 99213 OFFICE O/P EST LOW 20 MIN: CPT | Performed by: DERMATOLOGY

## 2020-01-27 NOTE — PROGRESS NOTES
Bree Willis is a 75year old female. HPI:     CC:  Patient presents with:  Full Skin Exam: LOV 8/15/19. pt presenting today with full body skin check.  pt hs HX of Melanoma        Allergies:  Adhesive Tape    HISTORY:    Past Medical History:   Diagn Pravastatin Sodium 20 MG Oral Tab TAKE 1 TABLET ONE TIME DAILY 90 tablet 2   • Citalopram Hydrobromide 10 MG Oral Tab Take 1 tablet (10 mg total) by mouth once daily.  90 tablet 2   • Vitamin Mixture (VITAMIN E COMPLETE OR) Take 400 Units by mouth every oth Not on file      Food insecurity:        Worry: Not on file        Inability: Not on file      Transportation needs:        Medical: Not on file        Non-medical: Not on file    Tobacco Use      Smoking status: Former Smoker        Packs/day: 0.50 Asked        Reaction to local anesthetic: No    Social History Narrative      Not on file    Family History   Problem Relation Age of Onset   • Heart Attack Father 47        MI; per NG   • Stroke Mother 66        per NG   • Breast Cancer Mother 68 uniformly pigmented, macules and papules 6 mm and less scattered on exam. pigmented lesions examined with dermoscopy benign-appearing patterns. Waxy tannish keratotic papules scattered, cherry-red vascular papules scattered.     See map today's date for medial cheek. no change    Extensive benign keratoses reassurance recheck 6 months    Please refer to map for specific lesions. See additional diagnoses. Pros cons of various therapies, risks benefits discussed. Pathophysiology discussed with patient.   The

## 2020-02-25 ENCOUNTER — OFFICE VISIT (OUTPATIENT)
Dept: PODIATRY CLINIC | Facility: CLINIC | Age: 75
End: 2020-02-25
Payer: MEDICARE

## 2020-02-25 DIAGNOSIS — B35.1 PAIN DUE TO ONYCHOMYCOSIS OF TOENAIL OF LEFT FOOT: ICD-10-CM

## 2020-02-25 DIAGNOSIS — B35.1 PAIN DUE TO ONYCHOMYCOSIS OF TOENAIL OF RIGHT FOOT: Primary | ICD-10-CM

## 2020-02-25 DIAGNOSIS — M79.674 PAIN DUE TO ONYCHOMYCOSIS OF TOENAIL OF RIGHT FOOT: Primary | ICD-10-CM

## 2020-02-25 DIAGNOSIS — M79.675 PAIN DUE TO ONYCHOMYCOSIS OF TOENAIL OF LEFT FOOT: ICD-10-CM

## 2020-02-25 PROCEDURE — 11721 DEBRIDE NAIL 6 OR MORE: CPT | Performed by: PODIATRIST

## 2020-02-25 NOTE — PROGRESS NOTES
HPI:    Patient ID: Vianey uGzmán is a 75year old female. This 34-year-old female presents with a chief complaint of pain associated with her toenails. All nails are affected by change. Patient states she has had relief by previous care.     ROS: without incident. I reduced nail, subungual debris, and some keratosis. No ulcerations or infections were noted upon debridement.   I anticipate relief will see patient for care if and when symptoms redevelop         ASSESSMENT/PLAN:   Pain due to onychom

## 2020-04-03 ENCOUNTER — TELEPHONE (OUTPATIENT)
Dept: INTERNAL MEDICINE CLINIC | Facility: CLINIC | Age: 75
End: 2020-04-03

## 2020-04-03 DIAGNOSIS — I10 ESSENTIAL HYPERTENSION: ICD-10-CM

## 2020-04-03 DIAGNOSIS — E78.5 HYPERLIPIDEMIA, UNSPECIFIED HYPERLIPIDEMIA TYPE: ICD-10-CM

## 2020-04-03 DIAGNOSIS — F32.A MILD DEPRESSION: ICD-10-CM

## 2020-04-03 RX ORDER — PRAVASTATIN SODIUM 20 MG
TABLET ORAL
Qty: 90 TABLET | Refills: 2 | Status: SHIPPED | OUTPATIENT
Start: 2020-04-03 | End: 2020-07-08 | Stop reason: DRUGHIGH

## 2020-04-03 RX ORDER — CITALOPRAM HYDROBROMIDE 10 MG/1
10 TABLET ORAL
Qty: 90 TABLET | Refills: 2 | Status: SHIPPED | OUTPATIENT
Start: 2020-04-03 | End: 2020-11-19

## 2020-04-03 RX ORDER — METOPROLOL SUCCINATE 25 MG/1
25 TABLET, EXTENDED RELEASE ORAL
Qty: 90 TABLET | Refills: 2 | Status: SHIPPED | OUTPATIENT
Start: 2020-04-03 | End: 2020-12-22

## 2020-04-29 ENCOUNTER — VIRTUAL PHONE E/M (OUTPATIENT)
Dept: INTERNAL MEDICINE CLINIC | Facility: CLINIC | Age: 75
End: 2020-04-29
Payer: MEDICARE

## 2020-04-29 ENCOUNTER — TELEPHONE (OUTPATIENT)
Dept: INTERNAL MEDICINE CLINIC | Facility: CLINIC | Age: 75
End: 2020-04-29

## 2020-04-29 VITALS — DIASTOLIC BLOOD PRESSURE: 78 MMHG | SYSTOLIC BLOOD PRESSURE: 126 MMHG | HEART RATE: 72 BPM

## 2020-04-29 DIAGNOSIS — I10 ESSENTIAL HYPERTENSION: ICD-10-CM

## 2020-04-29 DIAGNOSIS — F32.A MILD DEPRESSION: ICD-10-CM

## 2020-04-29 DIAGNOSIS — N18.30 CKD (CHRONIC KIDNEY DISEASE) STAGE 3, GFR 30-59 ML/MIN (HCC): ICD-10-CM

## 2020-04-29 DIAGNOSIS — E78.5 HYPERLIPIDEMIA, UNSPECIFIED HYPERLIPIDEMIA TYPE: Primary | ICD-10-CM

## 2020-04-29 PROCEDURE — 99443 PHONE E/M BY PHYS 21-30 MIN: CPT | Performed by: INTERNAL MEDICINE

## 2020-04-29 NOTE — ASSESSMENT & PLAN NOTE
Lipid panel and liver function tests have been stable on pravastatin at 20 mg daily. Continue the same dose of medication. Follow-up labs have been ordered already.

## 2020-04-29 NOTE — ASSESSMENT & PLAN NOTE
Blood pressure 126/78, pulse 72, not currently breastfeeding. Pressures stable. Currently on metoprolol at 25 mg daily  She has not had any chest pain, palpitations, shortness of breath. No nausea or vomiting. Continue to monitor at this time.   Due for

## 2020-04-29 NOTE — ASSESSMENT & PLAN NOTE
Persistent mild depressive disorder as well as anxiety. Stable on citalopram.  No confusion. She has been staying away from her  as he has been the cause for most of her anxieties. We will follow-up if any further needs.

## 2020-04-29 NOTE — PROGRESS NOTES
HPI:    Patient ID: Nakul De Los Santos is a 67year old female. Virtual/Telephone Check-In    Nakul De Los Santos verbally consents to a Virtual/Telephone Check-In service on 04/29/20.   Patient understands and accepts financial responsibility for any deducti suicidal ideas.   Frequency of symptoms: constantly   Severity: causing significant distress   Sleep quality: non-restorative  Nighttime awakenings: several  Compliance with medications:  %  Side effects:  Weight loss and insomnia      Review of Syste off      04/29/20  1241   BP: 126/78   Pulse: 72     There is no height or weight on file to calculate BMI. PHYSICAL EXAM:   Physical Exam   Constitutional: She is oriented to person, place, and time. She appears well-nourished. No distress.    Mark Agosto requested or ordered in this encounter       Imaging & Referrals:  None       [id number]

## 2020-04-29 NOTE — TELEPHONE ENCOUNTER
Patient had appointment today at [address] [place] [address]. She received a call at 10:30 when she was accepting her grocery delivery and missed the call. She is at home and will be available all day. Patient would like to know if she will still be getting a call back or if she should reschedule. Please advise.

## 2020-04-29 NOTE — PATIENT INSTRUCTIONS
Problem List Items Addressed This Visit        Unprioritized    CKD (chronic kidney disease) stage 3, GFR 30-59 ml/min ([facility])    Essential hypertension     Blood pressure 126/78, pulse 72, not currently breastfeeding. Pressures stable.   Currently on metopr

## 2020-05-07 ENCOUNTER — TELEPHONE (OUTPATIENT)
Dept: INTERNAL MEDICINE CLINIC | Facility: CLINIC | Age: 75
End: 2020-05-07

## 2020-05-07 NOTE — TELEPHONE ENCOUNTER
Patient reported tele-visit on 4/29 was recommended to monitor BP and call back with readings. Readings listed below, please advise if any other recommendations.

## 2020-05-07 NOTE — TELEPHONE ENCOUNTER
Per Jose Barr put reason under Acute:  Patient BP reading are:  05/04/2020  183lbs  135/72 Pulse 76  05/05/2020   183lbs  131/64 Pulse 72  05/06/2020   183lbs   132/63  None  05/07/2020    184lbs   124/64  Pulse 72      Per patient she will call her in [place]

## 2020-05-07 NOTE — TELEPHONE ENCOUNTER
Spoke with patient, (  verified ) informed of Dr. Addy Sam  instructions below    Patient verbalizes understanding and agrees.

## 2020-06-25 ENCOUNTER — OFFICE VISIT (OUTPATIENT)
Dept: DERMATOLOGY CLINIC | Facility: CLINIC | Age: 75
End: 2020-06-25
Payer: MEDICARE

## 2020-06-25 DIAGNOSIS — D23.60 BENIGN NEOPLASM OF SKIN OF UPPER LIMB, INCLUDING SHOULDER, UNSPECIFIED LATERALITY: ICD-10-CM

## 2020-06-25 DIAGNOSIS — L82.1 SEBORRHEIC KERATOSES: ICD-10-CM

## 2020-06-25 DIAGNOSIS — L57.0 AK (ACTINIC KERATOSIS): ICD-10-CM

## 2020-06-25 DIAGNOSIS — D23.5 BENIGN NEOPLASM OF SKIN OF TRUNK, EXCEPT SCROTUM: ICD-10-CM

## 2020-06-25 DIAGNOSIS — D23.4 BENIGN NEOPLASM OF SCALP AND SKIN OF NECK: ICD-10-CM

## 2020-06-25 DIAGNOSIS — D22.9 MULTIPLE NEVI: Primary | ICD-10-CM

## 2020-06-25 DIAGNOSIS — D23.30 BENIGN NEOPLASM OF SKIN OF FACE: ICD-10-CM

## 2020-06-25 DIAGNOSIS — L72.0 EPIDERMAL CYST: ICD-10-CM

## 2020-06-25 DIAGNOSIS — C69.90 OCULAR MELANOMA, UNSPECIFIED LATERALITY (HCC): ICD-10-CM

## 2020-06-25 DIAGNOSIS — D23.70 BENIGN NEOPLASM OF SKIN OF LOWER LIMB, INCLUDING HIP, UNSPECIFIED LATERALITY: ICD-10-CM

## 2020-06-25 PROCEDURE — 99213 OFFICE O/P EST LOW 20 MIN: CPT | Performed by: DERMATOLOGY

## 2020-06-25 PROCEDURE — G0463 HOSPITAL OUTPT CLINIC VISIT: HCPCS | Performed by: DERMATOLOGY

## 2020-06-29 NOTE — PROGRESS NOTES
Mariaa Coppola is a 67year old female. HPI:     CC:  Patient presents with:  Full Skin Exam: LOV 1/2020. Pt requesting full skin exam. Denies any changing moles. Personal hx of melanoma.          Allergies:  Adhesive Tape    HISTORY:    Past Medical Hi tablet 2   • Pravastatin Sodium 20 MG Oral Tab TAKE 1 TABLET ONE TIME DAILY 90 tablet 2   • Citalopram Hydrobromide 10 MG Oral Tab Take 1 tablet (10 mg total) by mouth once daily.  90 tablet 2   • Vitamin Mixture (VITAMIN E COMPLETE OR) Take 400 Units by mo resource strain: Not on file      Food insecurity:        Worry: Not on file        Inability: Not on file      Transportation needs:        Medical: Not on file        Non-medical: Not on file    Tobacco Use      Smoking status: Former Smoker        Packs Breast feeding: Not Asked        Reaction to local anesthetic: No    Social History Narrative      Not on file    Family History   Problem Relation Age of Onset   • Heart Attack Father 47        MI; per NG   • Stroke Mother 66        per NG   • Breast Canc back, chest,/ breasts, axillae,  abdomen, arms, legs, palms.      Multiple light to medium brown, well marginated, uniformly pigmented, macules and papules 6 mm and less scattered on exam. pigmented lesions examined with dermoscopy benign-appearing patterns Stable     History of dysplastic nevi. No recurrence of atypical nevi. No new suspicious lesions. Continue to observe papular lesion at right medial cheek. no change    Extensive benign keratoses reassurance recheck 6 months    Please refer to map for sp

## 2020-06-30 ENCOUNTER — LAB ENCOUNTER (OUTPATIENT)
Dept: LAB | Facility: HOSPITAL | Age: 75
End: 2020-06-30
Attending: INTERNAL MEDICINE
Payer: MEDICARE

## 2020-06-30 ENCOUNTER — OFFICE VISIT (OUTPATIENT)
Dept: PODIATRY CLINIC | Facility: CLINIC | Age: 75
End: 2020-06-30
Payer: MEDICARE

## 2020-06-30 DIAGNOSIS — B35.1 PAIN DUE TO ONYCHOMYCOSIS OF TOENAIL OF RIGHT FOOT: Primary | ICD-10-CM

## 2020-06-30 DIAGNOSIS — M79.674 PAIN DUE TO ONYCHOMYCOSIS OF TOENAIL OF RIGHT FOOT: Primary | ICD-10-CM

## 2020-06-30 DIAGNOSIS — M79.675 PAIN DUE TO ONYCHOMYCOSIS OF TOENAIL OF LEFT FOOT: ICD-10-CM

## 2020-06-30 DIAGNOSIS — B35.1 PAIN DUE TO ONYCHOMYCOSIS OF TOENAIL OF LEFT FOOT: ICD-10-CM

## 2020-06-30 DIAGNOSIS — I10 ESSENTIAL HYPERTENSION: ICD-10-CM

## 2020-06-30 LAB
ALBUMIN SERPL-MCNC: 3.7 G/DL (ref 3.4–5)
ALBUMIN/GLOB SERPL: 0.9 {RATIO} (ref 1–2)
ALP LIVER SERPL-CCNC: 89 U/L (ref 55–142)
ALT SERPL-CCNC: 22 U/L (ref 13–56)
ANION GAP SERPL CALC-SCNC: 6 MMOL/L (ref 0–18)
AST SERPL-CCNC: 26 U/L (ref 15–37)
BASOPHILS # BLD AUTO: 0.05 X10(3) UL (ref 0–0.2)
BASOPHILS NFR BLD AUTO: 1.2 %
BILIRUB SERPL-MCNC: 0.5 MG/DL (ref 0.1–2)
BILIRUB UR QL: NEGATIVE
BUN BLD-MCNC: 15 MG/DL (ref 7–18)
BUN/CREAT SERPL: 13 (ref 10–20)
CALCIUM BLD-MCNC: 9.2 MG/DL (ref 8.5–10.1)
CHLORIDE SERPL-SCNC: 105 MMOL/L (ref 98–112)
CHOLEST SMN-MCNC: 231 MG/DL (ref ?–200)
CO2 SERPL-SCNC: 31 MMOL/L (ref 21–32)
COLOR UR: YELLOW
CREAT BLD-MCNC: 1.15 MG/DL (ref 0.55–1.02)
DEPRECATED RDW RBC AUTO: 42.3 FL (ref 35.1–46.3)
EOSINOPHIL # BLD AUTO: 0.21 X10(3) UL (ref 0–0.7)
EOSINOPHIL NFR BLD AUTO: 4.9 %
ERYTHROCYTE [DISTWIDTH] IN BLOOD BY AUTOMATED COUNT: 12.4 % (ref 11–15)
GLOBULIN PLAS-MCNC: 4 G/DL (ref 2.8–4.4)
GLUCOSE BLD-MCNC: 102 MG/DL (ref 70–99)
GLUCOSE UR-MCNC: NEGATIVE MG/DL
HCT VFR BLD AUTO: 40.5 % (ref 35–48)
HDLC SERPL-MCNC: 53 MG/DL (ref 40–59)
HGB BLD-MCNC: 13 G/DL (ref 12–16)
HYALINE CASTS #/AREA URNS AUTO: 6 /LPF
IMM GRANULOCYTES # BLD AUTO: 0.01 X10(3) UL (ref 0–1)
IMM GRANULOCYTES NFR BLD: 0.2 %
KETONES UR-MCNC: NEGATIVE MG/DL
LDLC SERPL CALC-MCNC: 140 MG/DL (ref ?–100)
LYMPHOCYTES # BLD AUTO: 1.53 X10(3) UL (ref 1–4)
LYMPHOCYTES NFR BLD AUTO: 35.9 %
M PROTEIN MFR SERPL ELPH: 7.7 G/DL (ref 6.4–8.2)
MCH RBC QN AUTO: 30 PG (ref 26–34)
MCHC RBC AUTO-ENTMCNC: 32.1 G/DL (ref 31–37)
MCV RBC AUTO: 93.3 FL (ref 80–100)
MONOCYTES # BLD AUTO: 0.35 X10(3) UL (ref 0.1–1)
MONOCYTES NFR BLD AUTO: 8.2 %
NEUTROPHILS # BLD AUTO: 2.11 X10 (3) UL (ref 1.5–7.7)
NEUTROPHILS # BLD AUTO: 2.11 X10(3) UL (ref 1.5–7.7)
NEUTROPHILS NFR BLD AUTO: 49.6 %
NITRITE UR QL STRIP.AUTO: NEGATIVE
NONHDLC SERPL-MCNC: 178 MG/DL (ref ?–130)
OSMOLALITY SERPL CALC.SUM OF ELEC: 295 MOSM/KG (ref 275–295)
PATIENT FASTING Y/N/NP: YES
PATIENT FASTING Y/N/NP: YES
PH UR: 6 [PH] (ref 5–8)
PLATELET # BLD AUTO: 338 10(3)UL (ref 150–450)
POTASSIUM SERPL-SCNC: 4.8 MMOL/L (ref 3.5–5.1)
PROT UR-MCNC: 30 MG/DL
RBC # BLD AUTO: 4.34 X10(6)UL (ref 3.8–5.3)
RBC #/AREA URNS AUTO: 5 /HPF
SODIUM SERPL-SCNC: 142 MMOL/L (ref 136–145)
SP GR UR STRIP: 1.01 (ref 1–1.03)
TRIGL SERPL-MCNC: 192 MG/DL (ref 30–149)
TSI SER-ACNC: 1.33 MIU/ML (ref 0.36–3.74)
UROBILINOGEN UR STRIP-ACNC: <2
VLDLC SERPL CALC-MCNC: 38 MG/DL (ref 0–30)
WBC # BLD AUTO: 4.3 X10(3) UL (ref 4–11)
WBC #/AREA URNS AUTO: 73 /HPF

## 2020-06-30 PROCEDURE — 11721 DEBRIDE NAIL 6 OR MORE: CPT | Performed by: PODIATRIST

## 2020-06-30 PROCEDURE — 85025 COMPLETE CBC W/AUTO DIFF WBC: CPT

## 2020-06-30 PROCEDURE — 36415 COLL VENOUS BLD VENIPUNCTURE: CPT

## 2020-06-30 PROCEDURE — 80061 LIPID PANEL: CPT

## 2020-06-30 PROCEDURE — 84443 ASSAY THYROID STIM HORMONE: CPT

## 2020-06-30 PROCEDURE — 80053 COMPREHEN METABOLIC PANEL: CPT

## 2020-06-30 PROCEDURE — 81001 URINALYSIS AUTO W/SCOPE: CPT

## 2020-06-30 NOTE — PROGRESS NOTES
HPI:    Patient ID: Rusty Antoine is a 67year old female. 18-year-old female presents today with recurrent pain associated with her toenails. Patient states that she had relief by previous care.   The last time I saw this patient was February 25 of t I reduced nail, subungual debris, and some keratosis. No ulcerations or infections were noted.   I anticipate relief by this care and will see patient for treatment if and when symptoms redevelop           ASSESSMENT/PLAN:   Pain due to onychomycosis of to

## 2020-07-06 ENCOUNTER — HOSPITAL ENCOUNTER (OUTPATIENT)
Dept: MAMMOGRAPHY | Age: 75
Discharge: HOME | End: 2020-07-06
Attending: INTERNAL MEDICINE
Payer: MEDICARE

## 2020-07-06 ENCOUNTER — HOSPITAL ENCOUNTER (OUTPATIENT)
Dept: BONE DENSITY | Age: 75
Discharge: HOME | End: 2020-07-06
Attending: INTERNAL MEDICINE
Payer: MEDICARE

## 2020-07-06 DIAGNOSIS — Z12.31 VISIT FOR SCREENING MAMMOGRAM: ICD-10-CM

## 2020-07-06 DIAGNOSIS — Z78.0 MENOPAUSE: ICD-10-CM

## 2020-07-06 PROCEDURE — 77063 BREAST TOMOSYNTHESIS BI: CPT | Performed by: INTERNAL MEDICINE

## 2020-07-06 PROCEDURE — 77067 SCR MAMMO BI INCL CAD: CPT | Performed by: INTERNAL MEDICINE

## 2020-07-06 PROCEDURE — 77080 DXA BONE DENSITY AXIAL: CPT | Performed by: INTERNAL MEDICINE

## 2020-07-08 ENCOUNTER — OFFICE VISIT (OUTPATIENT)
Dept: INTERNAL MEDICINE CLINIC | Facility: CLINIC | Age: 75
End: 2020-07-08
Payer: MEDICARE

## 2020-07-08 VITALS
HEART RATE: 66 BPM | WEIGHT: 191 LBS | HEIGHT: 63 IN | SYSTOLIC BLOOD PRESSURE: 134 MMHG | RESPIRATION RATE: 16 BRPM | DIASTOLIC BLOOD PRESSURE: 70 MMHG | BODY MASS INDEX: 33.84 KG/M2

## 2020-07-08 DIAGNOSIS — F32.A MILD DEPRESSION: ICD-10-CM

## 2020-07-08 DIAGNOSIS — N18.30 CKD (CHRONIC KIDNEY DISEASE) STAGE 3, GFR 30-59 ML/MIN (HCC): ICD-10-CM

## 2020-07-08 DIAGNOSIS — I10 ESSENTIAL HYPERTENSION: Primary | ICD-10-CM

## 2020-07-08 DIAGNOSIS — E78.5 HYPERLIPIDEMIA, UNSPECIFIED HYPERLIPIDEMIA TYPE: ICD-10-CM

## 2020-07-08 DIAGNOSIS — E55.9 VITAMIN D DEFICIENCY: ICD-10-CM

## 2020-07-08 DIAGNOSIS — R82.81 PYURIA: ICD-10-CM

## 2020-07-08 PROBLEM — Z00.00 MEDICARE ANNUAL WELLNESS VISIT, INITIAL: Status: ACTIVE | Noted: 2020-07-08

## 2020-07-08 PROCEDURE — G0463 HOSPITAL OUTPT CLINIC VISIT: HCPCS | Performed by: INTERNAL MEDICINE

## 2020-07-08 PROCEDURE — 99214 OFFICE O/P EST MOD 30 MIN: CPT | Performed by: INTERNAL MEDICINE

## 2020-07-08 RX ORDER — PRAVASTATIN SODIUM 40 MG
40 TABLET ORAL NIGHTLY
Qty: 90 TABLET | Refills: 1 | Status: SHIPPED | OUTPATIENT
Start: 2020-07-08 | End: 2021-02-20

## 2020-07-08 NOTE — PATIENT INSTRUCTIONS
Problem List Items Addressed This Visit        Unprioritized    CKD (chronic kidney disease) stage 3, GFR 30-59 ml/min (HCC)     Persistent stage III chronic kidney disease, EGFR remains low. She has been drinking fluids.   Advised to continue to drink ple

## 2020-07-08 NOTE — PROGRESS NOTES
HPI:    Patient ID: Danilo Barrios is a 67year old female.     Notes recorded by Alex Tran MD on 7/8/2020 at 11:47 AM CDT  Urine test may be contaminated, we will discuss this at the visit.  Thyroid function test look normal.  Blood counts look nor symptoms: decreased concentration, excessive worry, fatigue, nervousness/anxiety, palpitations, panic, restlessness, weight gain and weight loss. Patient is not experiencing: suicidal ideas.   Frequency of symptoms: constantly   Severity: causing significa Take by mouth as needed. • Multiple Vitamins-Minerals (CENTRUM SILVER) Oral Tab Take 1 tablet by mouth daily. Allergies:  Adhesive Tape           RASH    Comment:Pulled the hair off      07/08/20  1153   BP: 134/70   Pulse:    Resp:       Body 134/70, pulse 66, resp. rate 16, height 5' 3\" (1.6 m), weight 191 lb (86.6 kg), not currently breastfeeding. Blood pressure much better upon recheck. Currently on metoprolol at 25 mg daily.   No chest pain, palpitations, shortness of breath or lower extr Pravastatin Sodium 40 MG Oral Tab 90 tablet 1     Sig: Take 1 tablet (40 mg total) by mouth nightly.        Imaging & Referrals:  None       [id number]

## 2020-07-08 NOTE — ASSESSMENT & PLAN NOTE
Urinalysis suggestive of pus in the urine but patient is asymptomatic and does not have any abdominal tenderness or renal angle tenderness to palpation. She does not have any fever, chills, frequency of urination.   Advised to drink plenty of fluids, reche

## 2020-07-08 NOTE — ASSESSMENT & PLAN NOTE
Blood pressure 134/70, pulse 66, resp. rate 16, height 5' 3\" (1.6 m), weight 191 lb (86.6 kg), not currently breastfeeding. Blood pressure much better upon recheck. Currently on metoprolol at 25 mg daily.   No chest pain, palpitations, shortness of breat

## 2020-07-08 NOTE — ASSESSMENT & PLAN NOTE
Lipid panel shows persistent elevation in the LDL cholesterol. She has been on pravastatin at 20 mg daily. Advised to increase this to 40 mg daily and recheck labs with the next blood draw in about 6 weeks.

## 2020-07-08 NOTE — ASSESSMENT & PLAN NOTE
Persistent stage III chronic kidney disease, EGFR remains low. She has been drinking fluids. Advised to continue to drink plenty of fluids, currently not on any nephrotoxic agents. Recheck labs in about 4 weeks.   Will need to rule out urinary infection

## 2020-07-17 ENCOUNTER — TELEPHONE (OUTPATIENT)
Dept: INTERNAL MEDICINE CLINIC | Facility: CLINIC | Age: 75
End: 2020-07-17

## 2020-07-17 NOTE — TELEPHONE ENCOUNTER
Patient has an appointment to see Dr. Cecily Hodge on 8-20-20 @4:30 patient states that time is too late. Per pt there is a bus service that picks her up and they only drive until 4:39 -6:02. Pt would like to know if the doctor can see her the week of August 20, 2020 in the morning.      Please reply to pool: HARVEY De La Cruz

## 2020-07-21 ENCOUNTER — OFFICE VISIT (OUTPATIENT)
Dept: OTOLARYNGOLOGY | Facility: CLINIC | Age: 75
End: 2020-07-21
Payer: MEDICARE

## 2020-07-21 VITALS
WEIGHT: 190 LBS | DIASTOLIC BLOOD PRESSURE: 80 MMHG | HEIGHT: 63 IN | TEMPERATURE: 98 F | BODY MASS INDEX: 33.66 KG/M2 | SYSTOLIC BLOOD PRESSURE: 123 MMHG

## 2020-07-21 DIAGNOSIS — H61.23 BILATERAL IMPACTED CERUMEN: Primary | ICD-10-CM

## 2020-07-21 PROCEDURE — 69210 REMOVE IMPACTED EAR WAX UNI: CPT | Performed by: OTOLARYNGOLOGY

## 2020-07-21 NOTE — PROGRESS NOTES
Enrique Seo is a 67year old female.   Patient presents with:  Cerumen Impaction: ear wax both ears      HISTORY OF PRESENT ILLNESS    Patient presents for cerumen removal. No other complaints or concerns at this time    Social History    Socioeconomi and unspecified hyperlipidemia    • Retinal scar of right eye     \"Retinal scarring due to melanoma/ radiation\"   • Unspecified essential hypertension        Past Surgical History:   Procedure Laterality Date   • CHOLECYSTECTOMY  1996   • COLONOSCOPY impaction   The cerumen impaction was completely removed using microscopy. Removal was completed by using acurette and/or suction. Comments: Return to clinic as needed. Avoid q-tips, water precautions and use over the counter wax remedies as needed.

## 2020-08-12 ENCOUNTER — TELEPHONE (OUTPATIENT)
Dept: INTERNAL MEDICINE CLINIC | Facility: CLINIC | Age: 75
End: 2020-08-12

## 2020-08-12 NOTE — TELEPHONE ENCOUNTER
Patient states she has an appointment with Radha Ruvalcaba 8/18/2020. Patient had an episode of diarrhea just once today. Hx irritable bowel syndrome/ has frequent, irregular flare ups. Patient will monitor her symptoms.   Advised to scheduled her lab work to davina

## 2020-08-14 ENCOUNTER — TELEPHONE (OUTPATIENT)
Dept: INTERNAL MEDICINE CLINIC | Facility: CLINIC | Age: 75
End: 2020-08-14

## 2020-08-14 NOTE — TELEPHONE ENCOUNTER
Patient calling and states she have a appointment with Dr. Stan Colmenaresday the 18th. She had a appointment yesterday for lab but she had to miss it she was not feeling well because of her stomach issues with the irritable bowel syndrome she want to know if she should still come in August 18 because she did not get the lab done.         Please advise and call her [phone]

## 2020-08-14 NOTE — TELEPHONE ENCOUNTER
Dr. Kamron Marroquin please advise message below. Pt has appt 8/18. She missed her lab appt and was wondering if she should still come in or reschedule.

## 2020-08-14 NOTE — TELEPHONE ENCOUNTER
Have labs done on the day of the appt,come in earlier than the appt.-reschedule labs for day of appt.

## 2020-08-18 ENCOUNTER — APPOINTMENT (OUTPATIENT)
Dept: LAB | Facility: HOSPITAL | Age: 75
End: 2020-08-18
Attending: INTERNAL MEDICINE
Payer: MEDICARE

## 2020-08-18 ENCOUNTER — OFFICE VISIT (OUTPATIENT)
Dept: INTERNAL MEDICINE CLINIC | Facility: CLINIC | Age: 75
End: 2020-08-18
Payer: MEDICARE

## 2020-08-18 VITALS
HEART RATE: 65 BPM | HEIGHT: 63 IN | WEIGHT: 192.81 LBS | DIASTOLIC BLOOD PRESSURE: 90 MMHG | SYSTOLIC BLOOD PRESSURE: 140 MMHG | RESPIRATION RATE: 16 BRPM | BODY MASS INDEX: 34.16 KG/M2

## 2020-08-18 DIAGNOSIS — E78.5 HYPERLIPIDEMIA, UNSPECIFIED HYPERLIPIDEMIA TYPE: ICD-10-CM

## 2020-08-18 DIAGNOSIS — I10 ESSENTIAL HYPERTENSION: Primary | ICD-10-CM

## 2020-08-18 LAB
ALBUMIN SERPL-MCNC: 3.8 G/DL (ref 3.4–5)
ALBUMIN/GLOB SERPL: 0.8 {RATIO} (ref 1–2)
ALP LIVER SERPL-CCNC: 92 U/L (ref 55–142)
ALT SERPL-CCNC: 28 U/L (ref 13–56)
ANION GAP SERPL CALC-SCNC: 7 MMOL/L (ref 0–18)
AST SERPL-CCNC: 28 U/L (ref 15–37)
BILIRUB SERPL-MCNC: 0.5 MG/DL (ref 0.1–2)
BILIRUB UR QL: NEGATIVE
BUN BLD-MCNC: 9 MG/DL (ref 7–18)
BUN/CREAT SERPL: 8.7 (ref 10–20)
CALCIUM BLD-MCNC: 8.7 MG/DL (ref 8.5–10.1)
CHLORIDE SERPL-SCNC: 98 MMOL/L (ref 98–112)
CLARITY UR: CLEAR
CO2 SERPL-SCNC: 29 MMOL/L (ref 21–32)
COLOR UR: YELLOW
CREAT BLD-MCNC: 1.03 MG/DL (ref 0.55–1.02)
GLOBULIN PLAS-MCNC: 4.5 G/DL (ref 2.8–4.4)
GLUCOSE BLD-MCNC: 97 MG/DL (ref 70–99)
GLUCOSE UR-MCNC: NEGATIVE MG/DL
HGB UR QL STRIP.AUTO: NEGATIVE
KETONES UR-MCNC: NEGATIVE MG/DL
M PROTEIN MFR SERPL ELPH: 8.3 G/DL (ref 6.4–8.2)
NITRITE UR QL STRIP.AUTO: NEGATIVE
OSMOLALITY SERPL CALC.SUM OF ELEC: 277 MOSM/KG (ref 275–295)
PATIENT FASTING Y/N/NP: NO
PH UR: 6 [PH] (ref 5–8)
POTASSIUM SERPL-SCNC: 4.2 MMOL/L (ref 3.5–5.1)
PROT UR-MCNC: NEGATIVE MG/DL
RBC #/AREA URNS AUTO: <1 /HPF
SODIUM SERPL-SCNC: 134 MMOL/L (ref 136–145)
SP GR UR STRIP: 1 (ref 1–1.03)
UROBILINOGEN UR STRIP-ACNC: <2
WBC #/AREA URNS AUTO: <1 /HPF

## 2020-08-18 PROCEDURE — 81001 URINALYSIS AUTO W/SCOPE: CPT | Performed by: INTERNAL MEDICINE

## 2020-08-18 PROCEDURE — 36415 COLL VENOUS BLD VENIPUNCTURE: CPT

## 2020-08-18 PROCEDURE — 80053 COMPREHEN METABOLIC PANEL: CPT

## 2020-08-18 PROCEDURE — G0463 HOSPITAL OUTPT CLINIC VISIT: HCPCS | Performed by: INTERNAL MEDICINE

## 2020-08-18 PROCEDURE — 99214 OFFICE O/P EST MOD 30 MIN: CPT | Performed by: INTERNAL MEDICINE

## 2020-08-18 RX ORDER — AMLODIPINE BESYLATE 5 MG/1
5 TABLET ORAL DAILY
Qty: 90 TABLET | Refills: 1 | Status: SHIPPED | OUTPATIENT
Start: 2020-08-18 | End: 2020-11-19

## 2020-08-18 NOTE — PATIENT INSTRUCTIONS
Problem List Items Addressed This Visit        Unprioritized    Essential hypertension - Primary     Blood pressure 140/90, pulse 65, resp. rate 16, height 5' 3\" (1.6 m), weight 192 lb 12.8 oz (87.5 kg), not currently breastfeeding.     Blood pressure tamie

## 2020-08-18 NOTE — ASSESSMENT & PLAN NOTE
Patient has been on pravastatin at 40 mg daily which she has tolerated well. Follow-up labs completed and pending at this time. We will continue to monitor.

## 2020-08-18 NOTE — PROGRESS NOTES
HPI:    Patient ID: Bree Willis is a 67year old female. On June 30– creatinine at 1.16 and EGFR 46. Hypertension   This is a chronic problem. The current episode started more than 1 year ago.  The problem has been gradually improving since onse 40 MG Oral Tab Take 1 tablet (40 mg total) by mouth nightly. 90 tablet 1   • Metoprolol Succinate ER 25 MG Oral Tablet 24 Hr Take 1 tablet (25 mg total) by mouth once daily.  90 tablet 2   • Citalopram Hydrobromide 10 MG Oral Tab Take 1 tablet (10 mg total) reviewed. ASSESSMENT/PLAN:     Problem List Items Addressed This Visit        Unprioritized    Essential hypertension - Primary     Blood pressure 140/90, pulse 65, resp.  rate 16, height 5' 3\" (1.6 m), weight 192 lb 12.8 oz (87.5 kg), not curr

## 2020-08-18 NOTE — ASSESSMENT & PLAN NOTE
Blood pressure 140/90, pulse 65, resp. rate 16, height 5' 3\" (1.6 m), weight 192 lb 12.8 oz (87.5 kg), not currently breastfeeding. Blood pressure remains elevated even upon recheck.   Patient's blood pressures swing quite a bit with emotional stressors

## 2020-09-02 ENCOUNTER — OFFICE VISIT (OUTPATIENT)
Dept: OPHTHALMOLOGY | Facility: CLINIC | Age: 75
End: 2020-09-02
Payer: MEDICARE

## 2020-09-02 DIAGNOSIS — H43.392 FLOATERS, LEFT: ICD-10-CM

## 2020-09-02 DIAGNOSIS — H25.13 AGE-RELATED NUCLEAR CATARACT OF BOTH EYES: Primary | ICD-10-CM

## 2020-09-02 DIAGNOSIS — C69.91 OCULAR MELANOMA, RIGHT (HCC): ICD-10-CM

## 2020-09-02 PROCEDURE — 92014 COMPRE OPH EXAM EST PT 1/>: CPT | Performed by: OPHTHALMOLOGY

## 2020-09-02 NOTE — PATIENT INSTRUCTIONS
Age-related nuclear cataract of both eyes  Discussed with patient that there is a significant cataract in the right eye, but due to poor vision secondary to melanoma will not consider cataract surgery unless Dr. Josue Franco recommends it.     Patient saw Dr. Darrick Nickerson

## 2020-09-02 NOTE — ASSESSMENT & PLAN NOTE
Discussed with patient that there is a significant cataract in the right eye, but due to poor vision secondary to melanoma will not consider cataract surgery unless Dr. Zaina Langston recommends it.     Patient saw Dr. Zaina Langston in December of 2019 and he recommended no

## 2020-09-02 NOTE — PROGRESS NOTES
June Kee is a 67year old female. HPI:     HPI     Patient is here for a complete exam. Pt states vision is about the same. Pt can only read large print, she has to get books with larger print. Pt saw Dr María Bustos on 12/19/19 and will see him again Alcohol/week: 0.0 standard drinks    Drug use: No      Medications:  Current Outpatient Medications   Medication Sig Dispense Refill   • amLODIPine Besylate 5 MG Oral Tab Take 1 tablet (5 mg total) by mouth daily.  90 tablet 1   • Pravastatin Sodium 40 MG O Dilation     Both eyes:  1.0% Mydriacyl and 2.5% Bladimir Synephrine @ 10:17 AM            Slit Lamp and Fundus Exam     Slit Lamp Exam       Right Left    Lids/Lashes Dermatochalasis, Meibomian gland dysfunction Dermatochalasis, Meibomian gland dysf treatment. No orders of the defined types were placed in this encounter.       Meds This Visit:  Requested Prescriptions      No prescriptions requested or ordered in this encounter        Follow up instructions:  Return in about 9 months (around 6/2/

## 2020-09-10 ENCOUNTER — TELEPHONE (OUTPATIENT)
Dept: INTERNAL MEDICINE CLINIC | Facility: CLINIC | Age: 75
End: 2020-09-10

## 2020-09-10 ENCOUNTER — NURSE ONLY (OUTPATIENT)
Dept: INTERNAL MEDICINE CLINIC | Facility: CLINIC | Age: 75
End: 2020-09-10
Payer: MEDICARE

## 2020-09-10 DIAGNOSIS — I10 ESSENTIAL HYPERTENSION: Primary | ICD-10-CM

## 2020-09-10 NOTE — TELEPHONE ENCOUNTER
PATIENT STATES SHE LAST HAD HER PNEUMONIA SHOT 04/29/2009 AND PATIENT IS WONDERING IF SHE IS DUE FOR ANOTHER ONE. SHE ALSO WANTS TO GET  HER FLU SHOT. IS IT OK IF PATIENT GETS BOTH INJECTIONS DONE ON THE SAME DAY? PLEASE ADVISE. SHE STATES HER ASSISTED LIVING IS OFFERING THE INJECTIONS.

## 2020-09-10 NOTE — PROGRESS NOTES
Patient here for BP reading First BP taken right arm was 146/83 pulse 64. After 15 minutes BP Taken sherie was 102/60 pulse was 69. Patient currently on Amlodipine 5 mg 1 tablet daily and Metoprolol 25 ER 1 tablet daily.  Gregory was advise and told her to cont

## 2020-10-07 ENCOUNTER — NURSE TRIAGE (OUTPATIENT)
Dept: INTERNAL MEDICINE CLINIC | Facility: CLINIC | Age: 75
End: 2020-10-07

## 2020-10-07 NOTE — TELEPHONE ENCOUNTER
Action Requested: Summary for Provider     []  Critical Lab, Recommendations Needed  [] Need Additional Advice  []   FYI    []   Need Orders  [] Need Medications Sent to Pharmacy  []  Other     SUMMARY: patient exposed to family member (son in law and daug

## 2020-10-07 NOTE — TELEPHONE ENCOUNTER
Advise quarantine for 10 days. Test if she develops symptoms of congestion, fevers, headaches, loss of taste or smell, shortness of breath or chest pain.

## 2020-11-03 ENCOUNTER — TELEPHONE (OUTPATIENT)
Dept: INTERNAL MEDICINE CLINIC | Facility: CLINIC | Age: 75
End: 2020-11-03

## 2020-11-03 NOTE — TELEPHONE ENCOUNTER
Yash Mcdonald at [facility] returning call reporting that patient did receive her high dose flu shot 9/17/20. Chart updated.

## 2020-11-03 NOTE — TELEPHONE ENCOUNTER
I received a call from pt that she got a call that she should call [address] [place] [address] office for a flu shot. Pt stated that she already got her on 9/17/2020.  Pt lives in a senior house and pt gave me a number to call them to verify she got the f/u shot the number is

## 2020-11-06 ENCOUNTER — OFFICE VISIT (OUTPATIENT)
Dept: PODIATRY CLINIC | Facility: CLINIC | Age: 75
End: 2020-11-06
Payer: MEDICARE

## 2020-11-06 DIAGNOSIS — B35.1 PAIN DUE TO ONYCHOMYCOSIS OF TOENAIL OF RIGHT FOOT: Primary | ICD-10-CM

## 2020-11-06 DIAGNOSIS — B35.1 PAIN DUE TO ONYCHOMYCOSIS OF TOENAIL OF LEFT FOOT: ICD-10-CM

## 2020-11-06 DIAGNOSIS — M79.675 PAIN DUE TO ONYCHOMYCOSIS OF TOENAIL OF LEFT FOOT: ICD-10-CM

## 2020-11-06 DIAGNOSIS — M79.674 PAIN DUE TO ONYCHOMYCOSIS OF TOENAIL OF RIGHT FOOT: Primary | ICD-10-CM

## 2020-11-06 PROCEDURE — 11721 DEBRIDE NAIL 6 OR MORE: CPT | Performed by: PODIATRIST

## 2020-11-06 NOTE — PROGRESS NOTES
HPI:    Patient ID: Isael Minaya is a 67year old female. This is a 65-year-old female presents with recurrent pain associated with her toenails. Patient reports relief by previous care. I saw this patient last on June 30 of this year.       ROS: periodic debridement. Careful and complete debridement of all 10 nails was accomplished today without incident. I reduced nail, subungual debris, and some keratosis. No ulcerations or infections were noted upon debridement.   I dissipate relief and will

## 2020-11-07 ENCOUNTER — NURSE TRIAGE (OUTPATIENT)
Dept: INTERNAL MEDICINE CLINIC | Facility: CLINIC | Age: 75
End: 2020-11-07

## 2020-11-07 DIAGNOSIS — I10 ESSENTIAL HYPERTENSION: Primary | ICD-10-CM

## 2020-11-07 RX ORDER — TRIAMTERENE AND HYDROCHLOROTHIAZIDE 37.5; 25 MG/1; MG/1
CAPSULE ORAL
Qty: 24 CAPSULE | Refills: 1 | Status: SHIPPED | OUTPATIENT
Start: 2020-11-07 | End: 2020-11-10

## 2020-11-07 RX ORDER — TRIAMTERENE AND HYDROCHLOROTHIAZIDE 37.5; 25 MG/1; MG/1
CAPSULE ORAL
Qty: 2 CAPSULE | Refills: 0 | Status: SHIPPED | OUTPATIENT
Start: 2020-11-07 | End: 2020-11-07

## 2020-11-07 NOTE — TELEPHONE ENCOUNTER
Action Requested: Summary for Provider     []  Critical Lab, Recommendations Needed  [] Need Additional Advice  []   FYI    []   Need Orders  [] Need Medications Sent to Pharmacy  []  Other     SUMMARY: c/c bilateral swelling top of feet, toes, no rednes

## 2020-11-07 NOTE — TELEPHONE ENCOUNTER
Spoke with pt and MD message below given. Pt verb understanding. Pt already has f/u scheduled for 11/19/20.     Pt states she has number to central scheduling to complete labs

## 2020-11-07 NOTE — TELEPHONE ENCOUNTER
notified, Pt instructed to take 1/2 of amlodipine 5 mg (2.5)  Start Dyazide (37.5/25)  Twice a week (Saturday /Wednesday)

## 2020-11-09 ENCOUNTER — TELEPHONE (OUTPATIENT)
Dept: INTERNAL MEDICINE CLINIC | Facility: CLINIC | Age: 75
End: 2020-11-09

## 2020-11-09 NOTE — TELEPHONE ENCOUNTER
Spoke with pt, DIDIER verified, pt was prescribed triamterene on 20, pt got 2 free pill from the local pharm while waiting for mail in.   Pt stated she is concerned because she called her mail in pharm and she was told they are holding her refill until 6

## 2020-11-10 RX ORDER — TRIAMTERENE AND HYDROCHLOROTHIAZIDE 37.5; 25 MG/1; MG/1
CAPSULE ORAL
Qty: 10 CAPSULE | Refills: 0 | Status: SHIPPED | OUTPATIENT
Start: 2020-11-10 | End: 2020-11-19

## 2020-11-10 NOTE — TELEPHONE ENCOUNTER
Patient states she will not receive her medication Triamterene for a week to 10 days from mail order pharmacy. Mail order pharmacy states they can't mail prescription until 11/12/20, it will be too early.      Patient asking if short supply can be set to O

## 2020-11-16 ENCOUNTER — LAB ENCOUNTER (OUTPATIENT)
Dept: LAB | Facility: HOSPITAL | Age: 75
End: 2020-11-16
Attending: INTERNAL MEDICINE
Payer: MEDICARE

## 2020-11-16 DIAGNOSIS — I10 ESSENTIAL HYPERTENSION: ICD-10-CM

## 2020-11-16 DIAGNOSIS — E78.5 HYPERLIPIDEMIA, UNSPECIFIED HYPERLIPIDEMIA TYPE: ICD-10-CM

## 2020-11-16 PROCEDURE — 36415 COLL VENOUS BLD VENIPUNCTURE: CPT

## 2020-11-16 PROCEDURE — 80053 COMPREHEN METABOLIC PANEL: CPT

## 2020-11-16 PROCEDURE — 80061 LIPID PANEL: CPT

## 2020-11-19 ENCOUNTER — VIRTUAL PHONE E/M (OUTPATIENT)
Dept: INTERNAL MEDICINE CLINIC | Facility: CLINIC | Age: 75
End: 2020-11-19

## 2020-11-19 VITALS
SYSTOLIC BLOOD PRESSURE: 125 MMHG | DIASTOLIC BLOOD PRESSURE: 58 MMHG | WEIGHT: 188 LBS | HEART RATE: 67 BPM | BODY MASS INDEX: 33 KG/M2

## 2020-11-19 DIAGNOSIS — N18.31 STAGE 3A CHRONIC KIDNEY DISEASE (HCC): ICD-10-CM

## 2020-11-19 DIAGNOSIS — I10 ESSENTIAL HYPERTENSION: Primary | ICD-10-CM

## 2020-11-19 DIAGNOSIS — E78.5 HYPERLIPIDEMIA, UNSPECIFIED HYPERLIPIDEMIA TYPE: ICD-10-CM

## 2020-11-19 DIAGNOSIS — F32.A MILD DEPRESSION: ICD-10-CM

## 2020-11-19 PROCEDURE — 3074F SYST BP LT 130 MM HG: CPT | Performed by: INTERNAL MEDICINE

## 2020-11-19 PROCEDURE — 99442 PHONE E/M BY PHYS 11-20 MIN: CPT | Performed by: INTERNAL MEDICINE

## 2020-11-19 PROCEDURE — 3078F DIAST BP <80 MM HG: CPT | Performed by: INTERNAL MEDICINE

## 2020-11-19 RX ORDER — AMLODIPINE BESYLATE 2.5 MG/1
2.5 TABLET ORAL DAILY
Qty: 90 TABLET | Refills: 1 | Status: SHIPPED
Start: 2020-11-19 | End: 2021-04-25

## 2020-11-19 RX ORDER — TRIAMTERENE AND HYDROCHLOROTHIAZIDE 37.5; 25 MG/1; MG/1
CAPSULE ORAL
Qty: 12 CAPSULE | Refills: 1 | Status: SHIPPED | OUTPATIENT
Start: 2020-11-19 | End: 2022-02-05

## 2020-11-19 RX ORDER — CITALOPRAM 10 MG/1
10 TABLET ORAL
Qty: 90 TABLET | Refills: 2 | Status: SHIPPED | OUTPATIENT
Start: 2020-11-19 | End: 2021-08-23

## 2020-11-19 NOTE — PROGRESS NOTES
HPI:    Patient ID: Gayle Enamorado is a 67year old female. Virtual/Telephone Check-In    Gayle Enamorado verbally consents to a Virtual/Telephone Check-In service on 11/19/20.   Patient has been referred to the [facility] website at [email or web address]. Musculoskeletal: Negative. Skin: Negative. Allergic/Immunologic: Negative. Neurological: Negative. Hematological: Negative. Psychiatric/Behavioral: Negative.              Current Outpatient Medications   Medication Sig Dispense R respiratory distress. She has no wheezes. Abdominal: Soft. She exhibits no distension. There is no abdominal tenderness. Musculoskeletal:         General: No edema. Neurological: She is alert and oriented to person, place, and time.    Skin: Skin is w

## 2020-11-20 ENCOUNTER — TELEPHONE (OUTPATIENT)
Dept: INTERNAL MEDICINE CLINIC | Facility: CLINIC | Age: 75
End: 2020-11-20

## 2020-11-20 NOTE — TELEPHONE ENCOUNTER
Patient was seen by Dr Michelle Donato 11/19, per the patient, she was advised by Dr. Michelle Donato to complete her lab orders the first week of January. Patient wants to know if she needs to schedule a follow up appointment with Dr. Michelle Donato. It was not indicated on the office notes. Please advise.

## 2020-11-20 NOTE — TELEPHONE ENCOUNTER
Called pt and informed that medication list copies were placed in mail as well as her blood test results  Pt verbalized understanding

## 2020-11-20 NOTE — TELEPHONE ENCOUNTER
Patient has to fill out medical forms for her senior living community and they are requesting that we mail out two copies of her medication list to  [phone] [id number]      Patient also requesting, if possible, that we mail he

## 2020-12-18 ENCOUNTER — OFFICE VISIT (OUTPATIENT)
Dept: NEPHROLOGY | Facility: CLINIC | Age: 75
End: 2020-12-18
Payer: MEDICARE

## 2020-12-18 VITALS
DIASTOLIC BLOOD PRESSURE: 73 MMHG | WEIGHT: 194.63 LBS | BODY MASS INDEX: 34 KG/M2 | SYSTOLIC BLOOD PRESSURE: 129 MMHG | HEART RATE: 69 BPM

## 2020-12-18 DIAGNOSIS — N18.31 STAGE 3A CHRONIC KIDNEY DISEASE (HCC): Primary | ICD-10-CM

## 2020-12-18 PROCEDURE — G0463 HOSPITAL OUTPT CLINIC VISIT: HCPCS | Performed by: INTERNAL MEDICINE

## 2020-12-18 PROCEDURE — 99205 OFFICE O/P NEW HI 60 MIN: CPT | Performed by: INTERNAL MEDICINE

## 2020-12-19 NOTE — PATIENT INSTRUCTIONS
Please do laboratory studies and the kidney ultrasound as ordered. Check your blood pressures daily and record.

## 2020-12-19 NOTE — PROGRESS NOTES
12/18/20        Patient: Nakul De Los Santos   YOB: 1945   Date of Visit: 12/18/2020       Dear  Dr. Adam Chong MD,      Thank you for referring Nakul De Los Santos to my practice. Please find my assessment and plan below.       As you know she is nephrosclerosis. Other causes will need to be excluded. For completion sake a repeat CBC, renal panel, sed rate, connective tissue profile, urine for Bence Garcia protein, urine for microalbumin and a renal ultrasound have been ordered.   Further impressio

## 2020-12-22 DIAGNOSIS — I10 ESSENTIAL HYPERTENSION: ICD-10-CM

## 2020-12-22 RX ORDER — METOPROLOL SUCCINATE 25 MG/1
TABLET, EXTENDED RELEASE ORAL
Qty: 90 TABLET | Refills: 2 | Status: SHIPPED | OUTPATIENT
Start: 2020-12-22 | End: 2021-09-08

## 2021-01-05 ENCOUNTER — TELEPHONE (OUTPATIENT)
Dept: NEPHROLOGY | Facility: CLINIC | Age: 76
End: 2021-01-05

## 2021-01-05 NOTE — TELEPHONE ENCOUNTER
Patient has CT scheduled tomorrow and asking if the water pill she takes on Wednesday not be taken, please call at:[phone],thanks.

## 2021-01-05 NOTE — TELEPHONE ENCOUNTER
Patient contacted and advised. Verified with patient that she is scheduled for an Ultrasound not CT scan.

## 2021-01-07 ENCOUNTER — OFFICE VISIT (OUTPATIENT)
Dept: DERMATOLOGY CLINIC | Facility: CLINIC | Age: 76
End: 2021-01-07
Payer: MEDICARE

## 2021-01-07 DIAGNOSIS — C69.90 OCULAR MELANOMA, UNSPECIFIED LATERALITY (HCC): ICD-10-CM

## 2021-01-07 DIAGNOSIS — D23.70 BENIGN NEOPLASM OF SKIN OF LOWER LIMB, INCLUDING HIP, UNSPECIFIED LATERALITY: ICD-10-CM

## 2021-01-07 DIAGNOSIS — L82.1 SEBORRHEIC KERATOSES: Primary | ICD-10-CM

## 2021-01-07 DIAGNOSIS — L72.0 EPIDERMAL CYST: ICD-10-CM

## 2021-01-07 DIAGNOSIS — D22.9 MULTIPLE NEVI: ICD-10-CM

## 2021-01-07 DIAGNOSIS — L57.0 AK (ACTINIC KERATOSIS): ICD-10-CM

## 2021-01-07 DIAGNOSIS — D23.5 BENIGN NEOPLASM OF SKIN OF TRUNK, EXCEPT SCROTUM: ICD-10-CM

## 2021-01-07 DIAGNOSIS — D23.60 BENIGN NEOPLASM OF SKIN OF UPPER LIMB, INCLUDING SHOULDER, UNSPECIFIED LATERALITY: ICD-10-CM

## 2021-01-07 DIAGNOSIS — D23.4 BENIGN NEOPLASM OF SCALP AND SKIN OF NECK: ICD-10-CM

## 2021-01-07 DIAGNOSIS — D23.30 BENIGN NEOPLASM OF SKIN OF FACE: ICD-10-CM

## 2021-01-07 DIAGNOSIS — B37.2 INTERTRIGINOUS CANDIDIASIS: ICD-10-CM

## 2021-01-07 PROCEDURE — 99213 OFFICE O/P EST LOW 20 MIN: CPT | Performed by: DERMATOLOGY

## 2021-01-09 ENCOUNTER — LAB ENCOUNTER (OUTPATIENT)
Dept: LAB | Facility: HOSPITAL | Age: 76
End: 2021-01-09
Attending: INTERNAL MEDICINE
Payer: MEDICARE

## 2021-01-09 DIAGNOSIS — I10 ESSENTIAL HYPERTENSION: ICD-10-CM

## 2021-01-09 DIAGNOSIS — N18.31 STAGE 3A CHRONIC KIDNEY DISEASE (HCC): ICD-10-CM

## 2021-01-09 LAB
ALBUMIN SERPL-MCNC: 4 G/DL (ref 3.4–5)
ALBUMIN/GLOB SERPL: 0.9 {RATIO} (ref 1–2)
ALP LIVER SERPL-CCNC: 105 U/L
ALT SERPL-CCNC: 26 U/L
ANION GAP SERPL CALC-SCNC: 5 MMOL/L (ref 0–18)
AST SERPL-CCNC: 29 U/L (ref 15–37)
BASOPHILS # BLD AUTO: 0.05 X10(3) UL (ref 0–0.2)
BASOPHILS NFR BLD AUTO: 0.9 %
BILIRUB SERPL-MCNC: 0.6 MG/DL (ref 0.1–2)
BILIRUB UR QL: NEGATIVE
BUN BLD-MCNC: 15 MG/DL (ref 7–18)
BUN/CREAT SERPL: 10.8 (ref 10–20)
C3 SERPL-MCNC: 137 MG/DL (ref 90–180)
C4 SERPL-MCNC: 23.3 MG/DL (ref 10–40)
CALCIUM BLD-MCNC: 9.8 MG/DL (ref 8.5–10.1)
CHLORIDE SERPL-SCNC: 102 MMOL/L (ref 98–112)
CHOLEST SMN-MCNC: 200 MG/DL (ref ?–200)
CO2 SERPL-SCNC: 30 MMOL/L (ref 21–32)
COLOR UR: YELLOW
CREAT BLD-MCNC: 1.39 MG/DL
CREAT UR-SCNC: 168 MG/DL
CRP SERPL-MCNC: <0.29 MG/DL (ref ?–0.3)
DEPRECATED RDW RBC AUTO: 41.8 FL (ref 35.1–46.3)
EOSINOPHIL # BLD AUTO: 0.16 X10(3) UL (ref 0–0.7)
EOSINOPHIL NFR BLD AUTO: 2.9 %
ERYTHROCYTE [DISTWIDTH] IN BLOOD BY AUTOMATED COUNT: 12.2 % (ref 11–15)
ERYTHROCYTE [SEDIMENTATION RATE] IN BLOOD: 27 MM/HR
GLOBULIN PLAS-MCNC: 4.4 G/DL (ref 2.8–4.4)
GLUCOSE BLD-MCNC: 106 MG/DL (ref 70–99)
GLUCOSE UR-MCNC: NEGATIVE MG/DL
HCT VFR BLD AUTO: 42.9 %
HDLC SERPL-MCNC: 58 MG/DL (ref 40–59)
HGB BLD-MCNC: 13.6 G/DL
IMM GRANULOCYTES # BLD AUTO: 0.02 X10(3) UL (ref 0–1)
IMM GRANULOCYTES NFR BLD: 0.4 %
KETONES UR-MCNC: NEGATIVE MG/DL
LDLC SERPL CALC-MCNC: 104 MG/DL (ref ?–100)
LYMPHOCYTES # BLD AUTO: 1.58 X10(3) UL (ref 1–4)
LYMPHOCYTES NFR BLD AUTO: 28.4 %
M PROTEIN MFR SERPL ELPH: 8.4 G/DL (ref 6.4–8.2)
MCH RBC QN AUTO: 29.4 PG (ref 26–34)
MCHC RBC AUTO-ENTMCNC: 31.7 G/DL (ref 31–37)
MCV RBC AUTO: 92.9 FL
MICROALBUMIN UR-MCNC: 1.74 MG/DL
MICROALBUMIN/CREAT 24H UR-RTO: 10.4 UG/MG (ref ?–30)
MONOCYTES # BLD AUTO: 0.47 X10(3) UL (ref 0.1–1)
MONOCYTES NFR BLD AUTO: 8.4 %
NEUTROPHILS # BLD AUTO: 3.29 X10 (3) UL (ref 1.5–7.7)
NEUTROPHILS # BLD AUTO: 3.29 X10(3) UL (ref 1.5–7.7)
NEUTROPHILS NFR BLD AUTO: 59 %
NITRITE UR QL STRIP.AUTO: NEGATIVE
NONHDLC SERPL-MCNC: 142 MG/DL (ref ?–130)
OSMOLALITY SERPL CALC.SUM OF ELEC: 285 MOSM/KG (ref 275–295)
PATIENT FASTING Y/N/NP: YES
PATIENT FASTING Y/N/NP: YES
PH UR: 6 [PH] (ref 5–8)
PHOSPHATE SERPL-MCNC: 3.2 MG/DL (ref 2.5–4.9)
PLATELET # BLD AUTO: 372 10(3)UL (ref 150–450)
POTASSIUM SERPL-SCNC: 4.2 MMOL/L (ref 3.5–5.1)
PROT UR-MCNC: 10.4 MG/DL
PROT UR-MCNC: NEGATIVE MG/DL
RBC # BLD AUTO: 4.62 X10(6)UL
RBC #/AREA URNS AUTO: 2 /HPF
RHEUMATOID FACT SERPL-ACNC: <10 IU/ML (ref ?–15)
SODIUM SERPL-SCNC: 137 MMOL/L (ref 136–145)
SP GR UR STRIP: 1.01 (ref 1–1.03)
TRIGL SERPL-MCNC: 191 MG/DL (ref 30–149)
UROBILINOGEN UR STRIP-ACNC: <2
VLDLC SERPL CALC-MCNC: 38 MG/DL (ref 0–30)
WBC # BLD AUTO: 5.6 X10(3) UL (ref 4–11)
WBC #/AREA URNS AUTO: 20 /HPF

## 2021-01-09 PROCEDURE — 86039 ANTINUCLEAR ANTIBODIES (ANA): CPT

## 2021-01-09 PROCEDURE — 86335 IMMUNFIX E-PHORSIS/URINE/CSF: CPT

## 2021-01-09 PROCEDURE — 86160 COMPLEMENT ANTIGEN: CPT

## 2021-01-09 PROCEDURE — 84165 PROTEIN E-PHORESIS SERUM: CPT

## 2021-01-09 PROCEDURE — 84166 PROTEIN E-PHORESIS/URINE/CSF: CPT

## 2021-01-09 PROCEDURE — 86431 RHEUMATOID FACTOR QUANT: CPT

## 2021-01-09 PROCEDURE — 84100 ASSAY OF PHOSPHORUS: CPT

## 2021-01-09 PROCEDURE — 85025 COMPLETE CBC W/AUTO DIFF WBC: CPT

## 2021-01-09 PROCEDURE — 86140 C-REACTIVE PROTEIN: CPT

## 2021-01-09 PROCEDURE — 82570 ASSAY OF URINE CREATININE: CPT

## 2021-01-09 PROCEDURE — 85652 RBC SED RATE AUTOMATED: CPT

## 2021-01-09 PROCEDURE — 86038 ANTINUCLEAR ANTIBODIES: CPT

## 2021-01-09 PROCEDURE — 82043 UR ALBUMIN QUANTITATIVE: CPT

## 2021-01-09 PROCEDURE — 36415 COLL VENOUS BLD VENIPUNCTURE: CPT

## 2021-01-09 PROCEDURE — 80053 COMPREHEN METABOLIC PANEL: CPT

## 2021-01-09 PROCEDURE — 81001 URINALYSIS AUTO W/SCOPE: CPT

## 2021-01-09 PROCEDURE — 80061 LIPID PANEL: CPT

## 2021-01-11 LAB
ALBUMIN SERPL ELPH-MCNC: 4.51 G/DL (ref 3.75–5.21)
ALBUMIN/GLOB SERPL: 1.29 {RATIO} (ref 1–2)
ALPHA1 GLOB SERPL ELPH-MCNC: 0.32 G/DL (ref 0.19–0.46)
ALPHA2 GLOB SERPL ELPH-MCNC: 0.89 G/DL (ref 0.48–1.05)
B-GLOBULIN SERPL ELPH-MCNC: 0.97 G/DL (ref 0.68–1.23)
GAMMA GLOB SERPL ELPH-MCNC: 1.31 G/DL (ref 0.62–1.7)
M PROTEIN MFR SERPL ELPH: 8 G/DL (ref 6.4–8.2)
NUCLEAR IGG TITR SER IF: POSITIVE {TITER}

## 2021-01-13 ENCOUNTER — HOSPITAL ENCOUNTER (OUTPATIENT)
Dept: ULTRASOUND IMAGING | Facility: HOSPITAL | Age: 76
Discharge: HOME | End: 2021-01-13
Attending: INTERNAL MEDICINE
Payer: MEDICARE

## 2021-01-13 DIAGNOSIS — N18.31 STAGE 3A CHRONIC KIDNEY DISEASE (HCC): ICD-10-CM

## 2021-01-13 LAB — ANA NUCLEOLAR TITR SER IF: 160 {TITER}

## 2021-01-13 PROCEDURE — 76770 US EXAM ABDO BACK WALL COMP: CPT | Performed by: INTERNAL MEDICINE

## 2021-01-14 ENCOUNTER — TELEPHONE (OUTPATIENT)
Dept: NEPHROLOGY | Facility: CLINIC | Age: 76
End: 2021-01-14

## 2021-01-14 ENCOUNTER — OFFICE VISIT (OUTPATIENT)
Dept: INTERNAL MEDICINE CLINIC | Facility: CLINIC | Age: 76
End: 2021-01-14
Payer: MEDICARE

## 2021-01-14 VITALS
DIASTOLIC BLOOD PRESSURE: 69 MMHG | BODY MASS INDEX: 33.31 KG/M2 | HEIGHT: 63 IN | TEMPERATURE: 99 F | HEART RATE: 77 BPM | SYSTOLIC BLOOD PRESSURE: 128 MMHG | RESPIRATION RATE: 18 BRPM | WEIGHT: 188 LBS

## 2021-01-14 DIAGNOSIS — E55.9 VITAMIN D DEFICIENCY: ICD-10-CM

## 2021-01-14 DIAGNOSIS — I10 ESSENTIAL HYPERTENSION: Primary | ICD-10-CM

## 2021-01-14 DIAGNOSIS — E78.5 HYPERLIPIDEMIA, UNSPECIFIED HYPERLIPIDEMIA TYPE: ICD-10-CM

## 2021-01-14 DIAGNOSIS — Z23 NEED FOR VACCINATION: ICD-10-CM

## 2021-01-14 DIAGNOSIS — N18.31 STAGE 3A CHRONIC KIDNEY DISEASE (HCC): ICD-10-CM

## 2021-01-14 PROCEDURE — 99214 OFFICE O/P EST MOD 30 MIN: CPT | Performed by: INTERNAL MEDICINE

## 2021-01-14 PROCEDURE — 90732 PPSV23 VACC 2 YRS+ SUBQ/IM: CPT | Performed by: INTERNAL MEDICINE

## 2021-01-14 PROCEDURE — G0009 ADMIN PNEUMOCOCCAL VACCINE: HCPCS | Performed by: INTERNAL MEDICINE

## 2021-01-14 NOTE — TELEPHONE ENCOUNTER
Patient returned call. Results message relayed. Appointment booked. Will bring in all her blood pressure readings as requested.

## 2021-01-14 NOTE — ASSESSMENT & PLAN NOTE
Lipid panel and liver function test have been stable on pravastatin at 40 mg daily. She has tolerated her medications well. Continue the same dose of medication and recheck labs as ordered.

## 2021-01-14 NOTE — TELEPHONE ENCOUNTER
Spoke with patient who says her BP machine has 4 pressure setting options. She's been using the lowest pressure setting of 150, she doesn't have the sherie. Options are:150, 180, 210, 240.  Unsure of exactly what the settings mean but patient reports good B

## 2021-01-14 NOTE — ASSESSMENT & PLAN NOTE
Gradually progressive CKD stage IIIb at this time. Creatinine at 1.39 which is slightly higher than what it was in the past.  EGFR has dropped from 53-46 and currently at 37. Nephrology evaluation reviewed. Discussed this with the patient.   Ultrasound

## 2021-01-14 NOTE — TELEPHONE ENCOUNTER
Labs and ultrasound are in. Kidney function a little worse. Please see soon for follow-up to review. Bring in any recent blood pressure readings.

## 2021-01-14 NOTE — TELEPHONE ENCOUNTER
Patient called in stating she has one question about the  light source blood pressure monitor , she needs to use it everyday but she forgot to ask about what setting she should put it on.  Please follow up

## 2021-01-14 NOTE — ASSESSMENT & PLAN NOTE
Blood pressure 128/69, pulse 77, temperature 98.7 °F (37.1 °C), temperature source Tympanic, resp. rate 18, height 5' 3\" (1.6 m), weight 188 lb (85.3 kg), not currently breastfeeding. Blood pressures look stable, well controlled at this time.   Continue o

## 2021-01-14 NOTE — PROGRESS NOTES
HPI:    Patient ID: Dalila Peguero is a 67year old female. Blood sugars are slightly elevated. Kidney function/creatinine levels continue to rise-1.39 at this time with consequent decline in the kidney filtration rate to 37.   This has been declinin SPE INTERPRETATION                                                                    Serum protein electrophoresis shows no evidence of significant abnormalities 10/01/2014  10/01/2016  09/21/2017                            09/17/2020  09/17/2020      FLUZONE 6 months and older PFS 0.5 ml (78096)                          09/26/2019      Influenza             10/14/2006  10/16/2007  10/08/2008 STRENGTH OR) Take  by mouth. • Loperamide HCl (IMODIUM A-D OR) Take by mouth as needed. • Multiple Vitamins-Minerals (CENTRUM SILVER) Oral Tab Take 1 tablet by mouth daily.          Allergies:  Adhesive Tape           RASH    Comment:Pulled the ewing pulse 77, temperature 98.7 °F (37.1 °C), temperature source Tympanic, resp. rate 18, height 5' 3\" (1.6 m), weight 188 lb (85.3 kg), not currently breastfeeding. Blood pressures look stable, well controlled at this time.   Continue on amlodipine 2.5 mg john Other Visit Diagnoses     Need for vaccination        Relevant Orders    PNEUMOCOCCAL IMM (PNEUMOVAX)          Return in about 4 months (around 5/14/2021).     PT UNDERSTANDS AND AGREES TO FOLLOW DIRECTIONS AND ADVICE    Orders Placed This Encounter

## 2021-01-17 NOTE — PROGRESS NOTES
Gayle Enamorado is a 67year old female. HPI:     CC:  Patient presents with:  Full Skin Exam: LOV 6/2020. Pt requesting full skin exam. Denies any changing moles. Denies family hx of skin ca. Personal hx of melanoma.          Allergies:  Adhesive Tape SUCCINATE ER 25 MG Oral Tablet 24 Hr TAKE 1 TABLET DAILY 90 tablet 2   • amLODIPine Besylate 2.5 MG Oral Tab Take 1 tablet (2.5 mg total) by mouth daily.  Dosage decreased  To 2.5 mg 10/7/20 90 tablet 1   • Citalopram Hydrobromide 10 MG Oral Tab Take 1 tabl Social History    Socioeconomic History      Marital status:       Spouse name: Not on file      Number of children: Not on file      Years of education: Not on file      Highest education level: Not on file    Occupational History      Not on f Exercise: Not Asked        Bike Helmet: Not Asked        Seat Belt: Not Asked        Self-Exams: Not Asked        Grew up on a farm: Not Asked        History of tanning: Not Asked        Outdoor occupation: Not Asked        Pt has a pacemaker: No        Pt in their usual state of health. History, medications, allergies reviewed as noted. ROS:  Denies any other systemic complaints. No new or changeing lesions other than noted above. No fevers, chills, night sweats, unusual sun sensitivity.   No other sk keratotic of the right flank stable. Extensive benign keratoses noted. Increasing number of lesions. Asymptomatic. Observation. No treatment at this time.   2  History of omphalitis, intertrigo  Umbilical inflammation in the past resolved is remained c

## 2021-01-19 ENCOUNTER — TELEPHONE (OUTPATIENT)
Dept: INTERNAL MEDICINE CLINIC | Facility: CLINIC | Age: 76
End: 2021-01-19

## 2021-01-19 NOTE — TELEPHONE ENCOUNTER
Patient was calling to inform Dr. Aminata Ballrad that she will be getting the COVID vaccine on Friday at her senior [facility] facility.

## 2021-01-29 DIAGNOSIS — Z23 NEED FOR VACCINATION: ICD-10-CM

## 2021-02-02 ENCOUNTER — OFFICE VISIT (OUTPATIENT)
Dept: PODIATRY CLINIC | Facility: CLINIC | Age: 76
End: 2021-02-02
Payer: MEDICARE

## 2021-02-02 ENCOUNTER — OFFICE VISIT (OUTPATIENT)
Dept: NEPHROLOGY | Facility: CLINIC | Age: 76
End: 2021-02-02
Payer: MEDICARE

## 2021-02-02 VITALS
BODY MASS INDEX: 33.66 KG/M2 | HEIGHT: 63 IN | DIASTOLIC BLOOD PRESSURE: 66 MMHG | HEART RATE: 74 BPM | WEIGHT: 190 LBS | SYSTOLIC BLOOD PRESSURE: 145 MMHG

## 2021-02-02 DIAGNOSIS — B35.1 PAIN DUE TO ONYCHOMYCOSIS OF TOENAIL OF RIGHT FOOT: Primary | ICD-10-CM

## 2021-02-02 DIAGNOSIS — B35.1 PAIN DUE TO ONYCHOMYCOSIS OF TOENAIL OF LEFT FOOT: ICD-10-CM

## 2021-02-02 DIAGNOSIS — M79.674 PAIN DUE TO ONYCHOMYCOSIS OF TOENAIL OF RIGHT FOOT: Primary | ICD-10-CM

## 2021-02-02 DIAGNOSIS — M79.675 PAIN DUE TO ONYCHOMYCOSIS OF TOENAIL OF LEFT FOOT: ICD-10-CM

## 2021-02-02 DIAGNOSIS — N18.32 STAGE 3B CHRONIC KIDNEY DISEASE (HCC): Primary | ICD-10-CM

## 2021-02-02 PROCEDURE — 99213 OFFICE O/P EST LOW 20 MIN: CPT | Performed by: INTERNAL MEDICINE

## 2021-02-02 PROCEDURE — 11721 DEBRIDE NAIL 6 OR MORE: CPT | Performed by: PODIATRIST

## 2021-02-02 NOTE — PROGRESS NOTES
HPI:    Patient ID: Kevin Israel is a 67year old female. 62-year-old female presents with recurrent pain associated with her toenails. She reports relief by previous treatment. The last time I saw this patient was November 6, 2020.       ROS:   I d Patient understands all options of care. Careful and complete debridement of each toenail was accomplished today without incident. I reduced nail, subungual debris, and some keratosis. Upon debridement there is no ulceration or infection noted.   I dissi

## 2021-02-04 NOTE — PROGRESS NOTES
02/04/21        Patient: Dina Keller   YOB: 1945   Date of Visit: 2/2/2021       Dear  Dr. Priyank Valentine MD,      Thank you for referring Dina Keller to my practice. Please find my assessment and plan below.       As you know she is a

## 2021-02-20 DIAGNOSIS — E78.5 HYPERLIPIDEMIA, UNSPECIFIED HYPERLIPIDEMIA TYPE: ICD-10-CM

## 2021-02-20 RX ORDER — PRAVASTATIN SODIUM 40 MG
40 TABLET ORAL NIGHTLY
Qty: 90 TABLET | Refills: 1 | Status: SHIPPED | OUTPATIENT
Start: 2021-02-20 | End: 2021-08-23

## 2021-02-20 NOTE — TELEPHONE ENCOUNTER
Pt needs a refill on    Pravastatin Sodium 40 MG Oral Tab 90 tablet 1 7/8/2020    Sig:   Take 1 tablet (40 mg total) by mouth nightly. Thank you.

## 2021-03-03 ENCOUNTER — TELEPHONE (OUTPATIENT)
Dept: INTERNAL MEDICINE CLINIC | Facility: CLINIC | Age: 76
End: 2021-03-03

## 2021-03-03 ENCOUNTER — LAB ENCOUNTER (OUTPATIENT)
Dept: LAB | Facility: HOSPITAL | Age: 76
End: 2021-03-03
Attending: INTERNAL MEDICINE
Payer: MEDICARE

## 2021-03-03 DIAGNOSIS — N18.32 STAGE 3B CHRONIC KIDNEY DISEASE (HCC): ICD-10-CM

## 2021-03-03 LAB
ALBUMIN SERPL-MCNC: 3.8 G/DL (ref 3.4–5)
ANION GAP SERPL CALC-SCNC: 3 MMOL/L (ref 0–18)
BASOPHILS # BLD AUTO: 0.05 X10(3) UL (ref 0–0.2)
BASOPHILS NFR BLD AUTO: 1 %
BUN BLD-MCNC: 14 MG/DL (ref 7–18)
BUN/CREAT SERPL: 12 (ref 10–20)
CALCIUM BLD-MCNC: 8.8 MG/DL (ref 8.5–10.1)
CHLORIDE SERPL-SCNC: 108 MMOL/L (ref 98–112)
CO2 SERPL-SCNC: 29 MMOL/L (ref 21–32)
CREAT BLD-MCNC: 1.17 MG/DL
DEPRECATED RDW RBC AUTO: 41.8 FL (ref 35.1–46.3)
EOSINOPHIL # BLD AUTO: 0.24 X10(3) UL (ref 0–0.7)
EOSINOPHIL NFR BLD AUTO: 4.6 %
ERYTHROCYTE [DISTWIDTH] IN BLOOD BY AUTOMATED COUNT: 12.1 % (ref 11–15)
GLUCOSE BLD-MCNC: 99 MG/DL (ref 70–99)
HCT VFR BLD AUTO: 40.2 %
HGB BLD-MCNC: 12.7 G/DL
IMM GRANULOCYTES # BLD AUTO: 0.02 X10(3) UL (ref 0–1)
IMM GRANULOCYTES NFR BLD: 0.4 %
LYMPHOCYTES # BLD AUTO: 1.3 X10(3) UL (ref 1–4)
LYMPHOCYTES NFR BLD AUTO: 25.1 %
MCH RBC QN AUTO: 29.4 PG (ref 26–34)
MCHC RBC AUTO-ENTMCNC: 31.6 G/DL (ref 31–37)
MCV RBC AUTO: 93.1 FL
MONOCYTES # BLD AUTO: 0.4 X10(3) UL (ref 0.1–1)
MONOCYTES NFR BLD AUTO: 7.7 %
NEUTROPHILS # BLD AUTO: 3.16 X10 (3) UL (ref 1.5–7.7)
NEUTROPHILS # BLD AUTO: 3.16 X10(3) UL (ref 1.5–7.7)
NEUTROPHILS NFR BLD AUTO: 61.2 %
OSMOLALITY SERPL CALC.SUM OF ELEC: 291 MOSM/KG (ref 275–295)
PHOSPHATE SERPL-MCNC: 3.3 MG/DL (ref 2.5–4.9)
PLATELET # BLD AUTO: 344 10(3)UL (ref 150–450)
POTASSIUM SERPL-SCNC: 4.7 MMOL/L (ref 3.5–5.1)
RBC # BLD AUTO: 4.32 X10(6)UL
SODIUM SERPL-SCNC: 140 MMOL/L (ref 136–145)
WBC # BLD AUTO: 5.2 X10(3) UL (ref 4–11)

## 2021-03-03 PROCEDURE — 80069 RENAL FUNCTION PANEL: CPT

## 2021-03-03 PROCEDURE — 36415 COLL VENOUS BLD VENIPUNCTURE: CPT

## 2021-03-03 PROCEDURE — 85025 COMPLETE CBC W/AUTO DIFF WBC: CPT

## 2021-03-03 NOTE — TELEPHONE ENCOUNTER
Blood work done for another doctor was done 3/3/2021. Friday February 26th got 2nd Covid injection and had a slight rash.   Kwabena Mccray

## 2021-03-03 NOTE — TELEPHONE ENCOUNTER
FYI to PCP     Immunization chart updated to reflect vaccine's received    Patient completed Dr. Mally Mandujano blood work orders

## 2021-03-06 ENCOUNTER — TELEPHONE (OUTPATIENT)
Dept: NEPHROLOGY | Facility: CLINIC | Age: 76
End: 2021-03-06

## 2021-03-06 DIAGNOSIS — N28.9 ABNORMAL KIDNEY FUNCTION: Primary | ICD-10-CM

## 2021-03-06 NOTE — TELEPHONE ENCOUNTER
Kidney function better. Back to where it was before. If doing well just repeat CBC and renal panel in 3 months.

## 2021-03-08 NOTE — TELEPHONE ENCOUNTER
Patient contacted. Results message relayed. Patient is aware to repeat labs in 3 months (6/21) and states she feels good and is happy to hear this message. Followup scheduled for 8/23/21. Orders entered in system.

## 2021-04-07 ENCOUNTER — NURSE TRIAGE (OUTPATIENT)
Dept: INTERNAL MEDICINE CLINIC | Facility: CLINIC | Age: 76
End: 2021-04-07

## 2021-04-07 NOTE — TELEPHONE ENCOUNTER
Action Requested: Summary for Provider     []  Critical Lab, Recommendations Needed  [x] Need Additional Advice  []   FYI    []   Need Orders  [] Need Medications Sent to Pharmacy  []  Other     SUMMARY: Dr. Chauncey Ortega, patient given home care advise.  Patient

## 2021-04-07 NOTE — TELEPHONE ENCOUNTER
Advised pt of providers note below. Pt verbalized understanding and no further questions or concerns. Pt states she did receive both of her COVID vaccines and encouraged to bring vaccine card to her apt so we can update her records. Apt made for 4/8/21.

## 2021-04-08 ENCOUNTER — OFFICE VISIT (OUTPATIENT)
Dept: INTERNAL MEDICINE CLINIC | Facility: CLINIC | Age: 76
End: 2021-04-08
Payer: MEDICARE

## 2021-04-08 VITALS
BODY MASS INDEX: 32.93 KG/M2 | HEIGHT: 63 IN | HEART RATE: 66 BPM | WEIGHT: 185.88 LBS | DIASTOLIC BLOOD PRESSURE: 74 MMHG | TEMPERATURE: 97 F | SYSTOLIC BLOOD PRESSURE: 135 MMHG

## 2021-04-08 DIAGNOSIS — K59.1 FUNCTIONAL DIARRHEA: Primary | ICD-10-CM

## 2021-04-08 PROCEDURE — 99213 OFFICE O/P EST LOW 20 MIN: CPT | Performed by: NURSE PRACTITIONER

## 2021-04-08 NOTE — PROGRESS NOTES
HPI:    Patient ID: Noble Monte is a 67year old female. HPI Diahhrea/Stress  Easter Sunday woke up 3 A. M. with diarrhea. Patient went four times. She took imodium D x2. Monday and Tuesday no diarrhea. Tuesday night night had light case.   No b Radiation plaque for 7 days; treated      Social History    Socioeconomic History      Marital status:       Spouse name: Not on file      Number of children: Not on file      Years of education: Not on file      Highest education level: Not on file Sig Dispense Refill   • Pravastatin Sodium 40 MG Oral Tab Take 1 tablet (40 mg total) by mouth nightly.  90 tablet 1   • METOPROLOL SUCCINATE ER 25 MG Oral Tablet 24 Hr TAKE 1 TABLET DAILY 90 tablet 2   • amLODIPine Besylate 2.5 MG Oral Tab Take 1 tablet (2 home.  When she is under stress she often gets diarrhea and she did get diarrhea last Sunday morning Easter Sunday. She went for times. Monday and Tuesday she had no diarrhea but she had one episode in the middle of the night on Tuesday.   Today is Thursd

## 2021-04-08 NOTE — ASSESSMENT & PLAN NOTE
A/P 58-year-old female who received some stressful information regarding her  who is at [address] [facility]. When she is under stress she often gets diarrhea and she did get diarrhea last Sunday morning Easter Sunday. She went for times.   [place] Dates

## 2021-04-24 NOTE — TELEPHONE ENCOUNTER
Patient asking for refill on her amlodipine besylate 2.5 mg oral tablet. Medication pended for review.

## 2021-04-25 RX ORDER — AMLODIPINE BESYLATE 2.5 MG/1
2.5 TABLET ORAL DAILY
Qty: 90 TABLET | Refills: 1 | Status: SHIPPED | OUTPATIENT
Start: 2021-04-25 | End: 2021-11-07

## 2021-05-12 ENCOUNTER — LAB ENCOUNTER (OUTPATIENT)
Dept: LAB | Facility: HOSPITAL | Age: 76
End: 2021-05-12
Attending: INTERNAL MEDICINE
Payer: MEDICARE

## 2021-05-12 DIAGNOSIS — I10 ESSENTIAL HYPERTENSION: ICD-10-CM

## 2021-05-12 DIAGNOSIS — E55.9 VITAMIN D DEFICIENCY: ICD-10-CM

## 2021-05-12 PROCEDURE — 36415 COLL VENOUS BLD VENIPUNCTURE: CPT

## 2021-05-12 PROCEDURE — 81001 URINALYSIS AUTO W/SCOPE: CPT

## 2021-05-12 PROCEDURE — 84443 ASSAY THYROID STIM HORMONE: CPT

## 2021-05-12 PROCEDURE — 82306 VITAMIN D 25 HYDROXY: CPT

## 2021-05-12 PROCEDURE — 80053 COMPREHEN METABOLIC PANEL: CPT

## 2021-05-12 PROCEDURE — 80061 LIPID PANEL: CPT

## 2021-05-12 PROCEDURE — 85025 COMPLETE CBC W/AUTO DIFF WBC: CPT

## 2021-05-14 ENCOUNTER — OFFICE VISIT (OUTPATIENT)
Dept: INTERNAL MEDICINE CLINIC | Facility: CLINIC | Age: 76
End: 2021-05-14
Payer: MEDICARE

## 2021-05-14 ENCOUNTER — OFFICE VISIT (OUTPATIENT)
Dept: PODIATRY CLINIC | Facility: CLINIC | Age: 76
End: 2021-05-14
Payer: MEDICARE

## 2021-05-14 VITALS
RESPIRATION RATE: 16 BRPM | DIASTOLIC BLOOD PRESSURE: 86 MMHG | BODY MASS INDEX: 33.16 KG/M2 | HEART RATE: 78 BPM | HEIGHT: 63 IN | TEMPERATURE: 98 F | WEIGHT: 187.13 LBS | SYSTOLIC BLOOD PRESSURE: 138 MMHG

## 2021-05-14 DIAGNOSIS — M79.675 PAIN DUE TO ONYCHOMYCOSIS OF TOENAIL OF LEFT FOOT: ICD-10-CM

## 2021-05-14 DIAGNOSIS — N18.31 STAGE 3A CHRONIC KIDNEY DISEASE (HCC): ICD-10-CM

## 2021-05-14 DIAGNOSIS — B35.1 PAIN DUE TO ONYCHOMYCOSIS OF TOENAIL OF RIGHT FOOT: Primary | ICD-10-CM

## 2021-05-14 DIAGNOSIS — M79.674 PAIN DUE TO ONYCHOMYCOSIS OF TOENAIL OF RIGHT FOOT: Primary | ICD-10-CM

## 2021-05-14 DIAGNOSIS — I10 ESSENTIAL HYPERTENSION: Primary | ICD-10-CM

## 2021-05-14 DIAGNOSIS — B35.1 PAIN DUE TO ONYCHOMYCOSIS OF TOENAIL OF LEFT FOOT: ICD-10-CM

## 2021-05-14 DIAGNOSIS — E78.5 HYPERLIPIDEMIA, UNSPECIFIED HYPERLIPIDEMIA TYPE: ICD-10-CM

## 2021-05-14 PROCEDURE — 99214 OFFICE O/P EST MOD 30 MIN: CPT | Performed by: INTERNAL MEDICINE

## 2021-05-14 PROCEDURE — 11721 DEBRIDE NAIL 6 OR MORE: CPT | Performed by: PODIATRIST

## 2021-05-14 NOTE — PROGRESS NOTES
HPI:    Patient ID: Alie Ho is a 67year old female. The 8year-old female presents with recurrent pain associated with her toenails. The last time I saw this patient she reports relief by previous treatment.   ROS:     I did review medical st accomplished today without incident. I reduced nail, subungual debris, and some keratosis. No ulcerations or infections were noted upon debridement.   I anticipate relief will see patient for care if and when symptoms redevelop         ASSESSMENT/PLAN:

## 2021-05-14 NOTE — ASSESSMENT & PLAN NOTE
Blood pressure 138/86, pulse 78, temperature 97.8 °F (36.6 °C), temperature source Tympanic, resp. rate 16, height 5' 3\" (1.6 m), weight 187 lb 1.6 oz (84.9 kg), not currently breastfeeding.      Patient has been on amlodipine at 2.5 mg daily and metoprolo

## 2021-05-14 NOTE — PROGRESS NOTES
HPI:    Patient ID: Zeenat Choudhary is a 67year old female. Labs discussed    Hypertension  This is a chronic problem. The current episode started more than 1 year ago. The problem has been gradually improving since onset. The problem is controlled. recently did show abnormality on the UA. Patient is however asymptomatic. Leticia Montero Nothing aggravates the symptoms. She has tried drinking (Avoidance of NSAIDs and hydration) for the symptoms. The treatment provided moderate relief.        Review of Systems   Cons Allergies:  Adhesive Tape           RASH    Comment:Pulled the hair off      05/14/21  1053   BP: 138/86   Pulse: 78   Resp: 16   Temp: 97.8 °F (36.6 °C)     Body mass index is 33.14 kg/m².     PHYSICAL EXAM:   Physical Exam  Vitals and nursing note rev Essential hypertension - Primary     Blood pressure 138/86, pulse 78, temperature 97.8 °F (36.6 °C), temperature source Tympanic, resp. rate 16, height 5' 3\" (1.6 m), weight 187 lb 1.6 oz (84.9 kg), not currently breastfeeding.      Patient has been on aml

## 2021-05-14 NOTE — ASSESSMENT & PLAN NOTE
Chronic kidney disease–EGFR has improved to 46 and creatinine has improved. Currently stage IIIa. Nephrology evaluation has been completed.   She is advised to repeat labs and follow-up with Dr. Pamella Devlin in august.    Advised to continue to avoid Advil, Al

## 2021-05-14 NOTE — PATIENT INSTRUCTIONS
Problem List Items Addressed This Visit        Unprioritized    CKD (chronic kidney disease) stage 3, GFR 30-59 ml/min (HCC)     Chronic kidney disease–EGFR has improved to 46 and creatinine has improved. Currently stage IIIa.   Nephrology evaluation has b

## 2021-05-14 NOTE — ASSESSMENT & PLAN NOTE
Lipid panel with elevated triglycerides but liver function test has been stable, has tolerated pravastatin at 40 mg daily. Continue the same dose of medication and recheck labs as ordered.

## 2021-06-02 ENCOUNTER — OFFICE VISIT (OUTPATIENT)
Dept: OPHTHALMOLOGY | Facility: CLINIC | Age: 76
End: 2021-06-02
Payer: MEDICARE

## 2021-06-02 DIAGNOSIS — H25.13 AGE-RELATED NUCLEAR CATARACT OF BOTH EYES: Primary | ICD-10-CM

## 2021-06-02 DIAGNOSIS — C69.91 OCULAR MELANOMA, RIGHT (HCC): ICD-10-CM

## 2021-06-02 DIAGNOSIS — H43.392 FLOATERS, LEFT: ICD-10-CM

## 2021-06-02 PROCEDURE — 92015 DETERMINE REFRACTIVE STATE: CPT | Performed by: OPHTHALMOLOGY

## 2021-06-02 PROCEDURE — 92014 COMPRE OPH EXAM EST PT 1/>: CPT | Performed by: OPHTHALMOLOGY

## 2021-06-02 NOTE — ASSESSMENT & PLAN NOTE
Discussed with patient that there is a significant cataract in the right eye, but due to poor vision secondary to melanoma will not consider cataract surgery unless Dr. Mike Pagan recommends it.     Patient saw Dr. Mike Pagan on 12/17/20 and he recommended no treatme

## 2021-06-02 NOTE — ASSESSMENT & PLAN NOTE
Diagnosed in 2010 and treated by Dr. Andi King at [facility]. Continue to follow up yearly. Patient saw Dr. Andi King on 12/17/20. She sees Dr. Andi King yearly. She will see an ophthalmologist twice a year; Dr. Andi King in December and Dr. Amanda Hampton in June.

## 2021-06-02 NOTE — PROGRESS NOTES
Gayle Enamorado is a 67year old female. HPI:     HPI     Pt in today for a complete eye exam. Pt last saw Dr. Yanelis Glaser on 12/17/2020 and has an appointment with him in December 2021. Pt states no changes in vision but would like an updated glasses Rx. Vaping Use      Vaping Use: Never used    Alcohol use: No      Alcohol/week: 0.0 standard drinks    Drug use: No      Medications:  Current Outpatient Medications   Medication Sig Dispense Refill   • amLODIPine Besylate 2.5 MG Oral Tab Take 1 tablet (2.5 m Slit Lamp and Fundus Exam     Slit Lamp Exam       Right Left    Lids/Lashes Dermatochalasis, Meibomian gland dysfunction Dermatochalasis, Meibomian gland dysfunction    Conjunctiva/Sclera Normal Normal    Cornea Clear Clear    Anterior Chamber Deep and will see an ophthalmologist twice a year; Dr. Josue Franco in December and Dr. Anahi Ochoa in June. Floaters, left  No treatment. No orders of the defined types were placed in this encounter.       Meds This Visit:  Requested Prescriptions      No pres

## 2021-06-02 NOTE — PATIENT INSTRUCTIONS
Ocular melanoma, right ([facility] [address]  Diagnosed in 2010 and treated by Dr. Zaina Langston at [facility]. Continue to follow up yearly. Patient saw Dr. Zaina Langston on 12/17/20. She sees Dr. Zaina Langston yearly.    She will see an ophthalmologist twice a year; Dr. Zaina Langston in December and

## 2021-06-12 ENCOUNTER — LAB ENCOUNTER (OUTPATIENT)
Dept: LAB | Facility: HOSPITAL | Age: 76
End: 2021-06-12
Attending: INTERNAL MEDICINE
Payer: MEDICARE

## 2021-06-12 DIAGNOSIS — N28.9 ABNORMAL KIDNEY FUNCTION: ICD-10-CM

## 2021-06-12 PROCEDURE — 80069 RENAL FUNCTION PANEL: CPT

## 2021-06-12 PROCEDURE — 85025 COMPLETE CBC W/AUTO DIFF WBC: CPT

## 2021-06-12 PROCEDURE — 36415 COLL VENOUS BLD VENIPUNCTURE: CPT

## 2021-06-13 ENCOUNTER — TELEPHONE (OUTPATIENT)
Dept: NEPHROLOGY | Facility: CLINIC | Age: 76
End: 2021-06-13

## 2021-06-13 DIAGNOSIS — N18.30 STAGE 3 CHRONIC KIDNEY DISEASE, UNSPECIFIED WHETHER STAGE 3A OR 3B CKD (HCC): Primary | ICD-10-CM

## 2021-06-14 NOTE — TELEPHONE ENCOUNTER
Patient contacted. Results message relayed. Patient denies starting any new medications and no fluid loss from diarrhea or vomiting. She will make sure to drink plenty of fluids and will repeat labs again in 1 month. Orders entered in system.

## 2021-07-08 ENCOUNTER — OFFICE VISIT (OUTPATIENT)
Dept: DERMATOLOGY CLINIC | Facility: CLINIC | Age: 76
End: 2021-07-08
Payer: MEDICARE

## 2021-07-08 DIAGNOSIS — D23.30 BENIGN NEOPLASM OF SKIN OF FACE: ICD-10-CM

## 2021-07-08 DIAGNOSIS — D22.9 MULTIPLE NEVI: Primary | ICD-10-CM

## 2021-07-08 DIAGNOSIS — L82.1 SEBORRHEIC KERATOSES: ICD-10-CM

## 2021-07-08 DIAGNOSIS — D23.60 BENIGN NEOPLASM OF SKIN OF UPPER LIMB, INCLUDING SHOULDER, UNSPECIFIED LATERALITY: ICD-10-CM

## 2021-07-08 DIAGNOSIS — D23.5 BENIGN NEOPLASM OF SKIN OF TRUNK, EXCEPT SCROTUM: ICD-10-CM

## 2021-07-08 DIAGNOSIS — C69.90 OCULAR MELANOMA, UNSPECIFIED LATERALITY (HCC): ICD-10-CM

## 2021-07-08 DIAGNOSIS — D23.4 BENIGN NEOPLASM OF SCALP AND SKIN OF NECK: ICD-10-CM

## 2021-07-08 DIAGNOSIS — L57.0 AK (ACTINIC KERATOSIS): ICD-10-CM

## 2021-07-08 PROCEDURE — 99213 OFFICE O/P EST LOW 20 MIN: CPT | Performed by: DERMATOLOGY

## 2021-07-09 ENCOUNTER — LAB ENCOUNTER (OUTPATIENT)
Dept: LAB | Facility: HOSPITAL | Age: 76
End: 2021-07-09
Attending: INTERNAL MEDICINE
Payer: MEDICARE

## 2021-07-09 DIAGNOSIS — N18.32 STAGE 3B CHRONIC KIDNEY DISEASE (HCC): ICD-10-CM

## 2021-07-09 DIAGNOSIS — N18.30 STAGE 3 CHRONIC KIDNEY DISEASE, UNSPECIFIED WHETHER STAGE 3A OR 3B CKD (HCC): ICD-10-CM

## 2021-07-09 DIAGNOSIS — E78.5 HYPERLIPIDEMIA, UNSPECIFIED HYPERLIPIDEMIA TYPE: ICD-10-CM

## 2021-07-09 LAB
ALBUMIN SERPL-MCNC: 3.7 G/DL (ref 3.4–5)
ANION GAP SERPL CALC-SCNC: 6 MMOL/L (ref 0–18)
BASOPHILS # BLD AUTO: 0.06 X10(3) UL (ref 0–0.2)
BASOPHILS NFR BLD AUTO: 1.3 %
BILIRUB UR QL: NEGATIVE
BUN BLD-MCNC: 20 MG/DL (ref 7–18)
BUN/CREAT SERPL: 14.9 (ref 10–20)
CALCIUM BLD-MCNC: 9.7 MG/DL (ref 8.5–10.1)
CHLORIDE SERPL-SCNC: 107 MMOL/L (ref 98–112)
CHOLEST SMN-MCNC: 191 MG/DL (ref ?–200)
CLARITY UR: CLEAR
CO2 SERPL-SCNC: 28 MMOL/L (ref 21–32)
COLOR UR: YELLOW
CREAT BLD-MCNC: 1.34 MG/DL
DEPRECATED RDW RBC AUTO: 41.5 FL (ref 35.1–46.3)
EOSINOPHIL # BLD AUTO: 0.23 X10(3) UL (ref 0–0.7)
EOSINOPHIL NFR BLD AUTO: 5 %
ERYTHROCYTE [DISTWIDTH] IN BLOOD BY AUTOMATED COUNT: 12 % (ref 11–15)
GLUCOSE BLD-MCNC: 101 MG/DL (ref 70–99)
GLUCOSE UR-MCNC: NEGATIVE MG/DL
HCT VFR BLD AUTO: 39.8 %
HDLC SERPL-MCNC: 51 MG/DL (ref 40–59)
HGB BLD-MCNC: 12.6 G/DL
HGB UR QL STRIP.AUTO: NEGATIVE
IMM GRANULOCYTES # BLD AUTO: 0 X10(3) UL (ref 0–1)
IMM GRANULOCYTES NFR BLD: 0 %
KETONES UR-MCNC: NEGATIVE MG/DL
LDLC SERPL CALC-MCNC: 110 MG/DL (ref ?–100)
LEUKOCYTE ESTERASE UR QL STRIP.AUTO: NEGATIVE
LYMPHOCYTES # BLD AUTO: 1.34 X10(3) UL (ref 1–4)
LYMPHOCYTES NFR BLD AUTO: 29.1 %
MCH RBC QN AUTO: 29.4 PG (ref 26–34)
MCHC RBC AUTO-ENTMCNC: 31.7 G/DL (ref 31–37)
MCV RBC AUTO: 93 FL
MONOCYTES # BLD AUTO: 0.38 X10(3) UL (ref 0.1–1)
MONOCYTES NFR BLD AUTO: 8.2 %
NEUTROPHILS # BLD AUTO: 2.6 X10 (3) UL (ref 1.5–7.7)
NEUTROPHILS # BLD AUTO: 2.6 X10(3) UL (ref 1.5–7.7)
NEUTROPHILS NFR BLD AUTO: 56.4 %
NITRITE UR QL STRIP.AUTO: NEGATIVE
NONHDLC SERPL-MCNC: 140 MG/DL (ref ?–130)
OSMOLALITY SERPL CALC.SUM OF ELEC: 295 MOSM/KG (ref 275–295)
PATIENT FASTING Y/N/NP: YES
PH UR: 7 [PH] (ref 5–8)
PHOSPHATE SERPL-MCNC: 3.3 MG/DL (ref 2.5–4.9)
PLATELET # BLD AUTO: 357 10(3)UL (ref 150–450)
POTASSIUM SERPL-SCNC: 4.4 MMOL/L (ref 3.5–5.1)
PROT UR-MCNC: NEGATIVE MG/DL
RBC # BLD AUTO: 4.28 X10(6)UL
SODIUM SERPL-SCNC: 141 MMOL/L (ref 136–145)
SP GR UR STRIP: 1 (ref 1–1.03)
TRIGL SERPL-MCNC: 170 MG/DL (ref 30–149)
UROBILINOGEN UR STRIP-ACNC: <2
VLDLC SERPL CALC-MCNC: 29 MG/DL (ref 0–30)
WBC # BLD AUTO: 4.6 X10(3) UL (ref 4–11)

## 2021-07-09 PROCEDURE — 81003 URINALYSIS AUTO W/O SCOPE: CPT | Performed by: INTERNAL MEDICINE

## 2021-07-09 PROCEDURE — 85025 COMPLETE CBC W/AUTO DIFF WBC: CPT

## 2021-07-09 PROCEDURE — 80069 RENAL FUNCTION PANEL: CPT

## 2021-07-09 PROCEDURE — 80061 LIPID PANEL: CPT

## 2021-07-09 PROCEDURE — 36415 COLL VENOUS BLD VENIPUNCTURE: CPT

## 2021-07-11 ENCOUNTER — TELEPHONE (OUTPATIENT)
Dept: NEPHROLOGY | Facility: CLINIC | Age: 76
End: 2021-07-11

## 2021-07-12 NOTE — PROGRESS NOTES
Diamond Farris is a 67year old female.   HPI:     CC:  Patient presents with:  Upper Body Exam: pt is here for 6 month follow up for upper body exam, personal HX of melanoma, denies family HX of skin cancer,  1/7/2021        Allergies:  Adhesive Tape Medication Sig Dispense Refill   • amLODIPine Besylate 2.5 MG Oral Tab Take 1 tablet (2.5 mg total) by mouth daily. Dosage decreased  To 2.5 mg 10/7/20 90 tablet 1   • Pravastatin Sodium 40 MG Oral Tab Take 1 tablet (40 mg total) by mouth nightly.  90 tab Socioeconomic History      Marital status:       Spouse name: Not on file      Number of children: Not on file      Years of education: Not on file      Highest education level: Not on file    Occupational History      Not on file    Tobacco Use Social Connections:       Frequency of Communication with Friends and Family:       Frequency of Social Gatherings with Friends and Family:       Attends Quaker Services:       Active Member of Clubs or Organizations:       Attends Club or Jeffery stressed. Patient has been in their usual state of health. History, medications, allergies reviewed as noted. ROS:  Denies any other systemic complaints. No new or changeing lesions other than noted above.  No fevers, chills, night sweats, unusual lesions. Asymptomatic. Observation. No treatment at this time. History of omphalitis, intertrigo stable  Umbilical inflammation in the past resolved is remained clear. Intertrigo stable. Discussed topical and oral medications.  fluconazole again if recognition software. Please contact me regarding any confusion resulting from errors in recognition.

## 2021-07-20 ENCOUNTER — TELEPHONE (OUTPATIENT)
Dept: INTERNAL MEDICINE CLINIC | Facility: CLINIC | Age: 76
End: 2021-07-20

## 2021-07-20 ENCOUNTER — OFFICE VISIT (OUTPATIENT)
Dept: OTOLARYNGOLOGY | Facility: CLINIC | Age: 76
End: 2021-07-20
Payer: MEDICARE

## 2021-07-20 VITALS
BODY MASS INDEX: 32.78 KG/M2 | DIASTOLIC BLOOD PRESSURE: 78 MMHG | TEMPERATURE: 98 F | WEIGHT: 185 LBS | HEIGHT: 63 IN | SYSTOLIC BLOOD PRESSURE: 158 MMHG

## 2021-07-20 DIAGNOSIS — Z12.31 VISIT FOR SCREENING MAMMOGRAM: Primary | ICD-10-CM

## 2021-07-20 DIAGNOSIS — H61.23 BILATERAL IMPACTED CERUMEN: Primary | ICD-10-CM

## 2021-07-20 PROCEDURE — 69210 REMOVE IMPACTED EAR WAX UNI: CPT | Performed by: OTOLARYNGOLOGY

## 2021-07-20 NOTE — PROGRESS NOTES
Nakul De Los Santos is a 67year old female. Patient presents with: Follow - Up: pt presents today due to both ears full of wax, needs cleaning.       HISTORY OF PRESENT ILLNESS        Social History    Socioeconomic History      Marital status:  and unspecified hyperlipidemia    • Retinal scar of right eye     \"Retinal scarring due to melanoma/ radiation\"   • Unspecified essential hypertension        Past Surgical History:   Procedure Laterality Date   • CHOLECYSTECTOMY  1996   • COLONOSCOPY Normal, Left: Normal. Canal - Right: Normal, Left: Normal. TM - Right: Normal, Left: Normal.   Skin Normal Inspection - Normal.        Lymph Detail Normal Submental. Submandibular. Anterior cervical. Posterior cervical. Supraclavicular.         Nose/Mouth/T recognition dictation software. As a result errors may occur. When identified these errors have been corrected. While every attempt is made to correct errors during dictation discrepancies may still exist    King Gresham MD    7/20/2021    11:29 AM

## 2021-07-20 NOTE — TELEPHONE ENCOUNTER
Patient states she is due for a mammogram but she has her physical appt in Sept, Patient wants to know if order for mammogram can be placed.  Please advise

## 2021-08-21 DIAGNOSIS — F32.A MILD DEPRESSION: ICD-10-CM

## 2021-08-21 DIAGNOSIS — E78.5 HYPERLIPIDEMIA, UNSPECIFIED HYPERLIPIDEMIA TYPE: ICD-10-CM

## 2021-08-21 NOTE — TELEPHONE ENCOUNTER
Patient is requesting refill of medications Citalopram Hydrobromide 10 MG Oral Tab and Pravastatin Sodium 40 MG Oral Tab. Patient states she has 2 weeks left of medication. Patient states she is contacting Dr. Gifty Orellana office for refill due to it being easier to request medication this way.

## 2021-08-23 ENCOUNTER — OFFICE VISIT (OUTPATIENT)
Dept: NEPHROLOGY | Facility: CLINIC | Age: 76
End: 2021-08-23
Payer: MEDICARE

## 2021-08-23 VITALS
HEIGHT: 63 IN | TEMPERATURE: 99 F | BODY MASS INDEX: 32.84 KG/M2 | DIASTOLIC BLOOD PRESSURE: 63 MMHG | SYSTOLIC BLOOD PRESSURE: 107 MMHG | HEART RATE: 66 BPM | WEIGHT: 185.31 LBS

## 2021-08-23 DIAGNOSIS — N18.32 STAGE 3B CHRONIC KIDNEY DISEASE (HCC): Primary | ICD-10-CM

## 2021-08-23 PROCEDURE — 99214 OFFICE O/P EST MOD 30 MIN: CPT | Performed by: INTERNAL MEDICINE

## 2021-08-23 RX ORDER — PRAVASTATIN SODIUM 40 MG
40 TABLET ORAL NIGHTLY
Qty: 90 TABLET | Refills: 1 | Status: SHIPPED | OUTPATIENT
Start: 2021-08-23 | End: 2022-02-05

## 2021-08-23 RX ORDER — CITALOPRAM HYDROBROMIDE 10 MG/1
10 TABLET ORAL
Qty: 90 TABLET | Refills: 1 | Status: SHIPPED | OUTPATIENT
Start: 2021-08-23 | End: 2022-02-05

## 2021-08-23 NOTE — PROGRESS NOTES
08/23/21        Patient: Lorelei Morse   YOB: 1945   Date of Visit: 8/23/2021       Dear  Dr. Anabel Roy MD,      Thank you for referring Lorelei Morse to my practice. Please find my assessment and plan below.       As you know she is a

## 2021-08-23 NOTE — TELEPHONE ENCOUNTER
Refill passed per [place] [facility] [place] protocol. Requested Prescriptions   Pending Prescriptions Disp Refills    citalopram hydrobromide 10 MG Oral Tab 90 tablet 2     Sig: Take 1 tablet (10 mg total) by mouth once daily. Psychiatric Non-Scheduled (Anti-Anxiety) Passed - 8/23/2021  9:41 AM        Passed - Appointment in last 6 or next 3 months           pravastatin 40 MG Oral Tab 90 tablet 1     Sig: Take 1 tablet (40 mg total) by mouth nightly.         Cholesterol Medication Protocol Passed - 8/23/2021  9:41 AM        Passed - ALT in past 12 months        Passed - LDL in past 12 months        Passed - Last ALT < 80       Lab Results   Component Value Date    ALT 24 05/12/2021             Passed - Last LDL < 130     Lab Results   Component Value Date     (H) 07/09/2021             Passed - Appointment in past 12 or next 3 months               Recent Outpatient Visits              1 month ago Bilateral impacted cerumen    [facility], Acosta Coker Brice Creed, MD    Office Visit    1 month ago Multiple nevi    [address] Dermatology Yordan Christine MD    Office Visit    2 months ago Age-related nuclear cataract of both eyes    [facility] Ophthalmology Refsanta Montes De Oca MD    Office Visit    3 months ago Pain due to onychomycosis of toenail of right foot    [facility], Sheela, [address] , [facility] Nnamdi, DP    Office Visit    3 months ago Essential hypertension    [facility], Johana Coker MD    Office Visit            Future Appointments         Provider Department Appt Notes    Today Madeleine Angeles MD [facility], [address] [facility]     In 1 week [facility] [address]     In 2 weeks Jennifer Workman [facility], [address] [facility] [address] nail care *policy informed    In 1 month Wanda Calles MD [address] [facility], [facility] px    In 1 month Jasmina Pew, [address] [facility] Dermatology 3 MO F/U    In 9 months Chris Valentin MD [facility] Ophthalmology Dilated exam.    In 11 months Aaron Dorsey MD [facility], [address], [place] 1 year ears check

## 2021-09-04 ENCOUNTER — HOSPITAL ENCOUNTER (OUTPATIENT)
Dept: MAMMOGRAPHY | Facility: HOSPITAL | Age: 76
Discharge: HOME | End: 2021-09-04
Attending: INTERNAL MEDICINE
Payer: MEDICARE

## 2021-09-04 DIAGNOSIS — Z12.31 VISIT FOR SCREENING MAMMOGRAM: ICD-10-CM

## 2021-09-04 PROCEDURE — 77067 SCR MAMMO BI INCL CAD: CPT | Performed by: INTERNAL MEDICINE

## 2021-09-04 PROCEDURE — 77063 BREAST TOMOSYNTHESIS BI: CPT | Performed by: INTERNAL MEDICINE

## 2021-09-08 ENCOUNTER — OFFICE VISIT (OUTPATIENT)
Dept: PODIATRY CLINIC | Facility: CLINIC | Age: 76
End: 2021-09-08
Payer: MEDICARE

## 2021-09-08 ENCOUNTER — TELEPHONE (OUTPATIENT)
Dept: INTERNAL MEDICINE CLINIC | Facility: CLINIC | Age: 76
End: 2021-09-08

## 2021-09-08 VITALS — WEIGHT: 185 LBS | BODY MASS INDEX: 32.78 KG/M2 | HEIGHT: 63 IN

## 2021-09-08 DIAGNOSIS — I10 ESSENTIAL HYPERTENSION: ICD-10-CM

## 2021-09-08 DIAGNOSIS — B35.1 PAIN DUE TO ONYCHOMYCOSIS OF TOENAIL OF LEFT FOOT: ICD-10-CM

## 2021-09-08 DIAGNOSIS — B35.1 PAIN DUE TO ONYCHOMYCOSIS OF TOENAIL OF RIGHT FOOT: Primary | ICD-10-CM

## 2021-09-08 DIAGNOSIS — M79.674 PAIN DUE TO ONYCHOMYCOSIS OF TOENAIL OF RIGHT FOOT: Primary | ICD-10-CM

## 2021-09-08 DIAGNOSIS — M79.675 PAIN DUE TO ONYCHOMYCOSIS OF TOENAIL OF LEFT FOOT: ICD-10-CM

## 2021-09-08 PROCEDURE — 11721 DEBRIDE NAIL 6 OR MORE: CPT | Performed by: PODIATRIST

## 2021-09-08 RX ORDER — METOPROLOL SUCCINATE 25 MG/1
25 TABLET, EXTENDED RELEASE ORAL DAILY
Qty: 90 TABLET | Refills: 2 | Status: SHIPPED | OUTPATIENT
Start: 2021-09-08

## 2021-09-08 NOTE — TELEPHONE ENCOUNTER
Please review. Protocol failed / No Protocol. Requested Prescriptions   Pending Prescriptions Disp Refills    metoprolol succinate 25 MG Oral Tablet 24 Hr 90 tablet 2     Sig: Take 1 tablet (25 mg total) by mouth daily.         Hypertensive Medications P

## 2021-09-08 NOTE — PROGRESS NOTES
HPI:    Patient ID: Collette Sacramento is a 67year old female. 59-year-old female presents with recurrent pain associated with her toenails. The last time I saw this patient was May 14 and she reports relief by previous care.       ROS:     There are no a ulcerations or infections were noted upon debridement.   I dissipate relief by this care and will see patient for treatment if and when symptoms redevelop         ASSESSMENT/PLAN:   Pain due to onychomycosis of toenail of right foot  (primary encounter diag

## 2021-09-08 NOTE — TELEPHONE ENCOUNTER
Pt has question for Dr. Manpreet Amato if she should take a flu shot, giving them out where she lives on September 16th 2021, please advise

## 2021-09-29 ENCOUNTER — OFFICE VISIT (OUTPATIENT)
Dept: INTERNAL MEDICINE CLINIC | Facility: CLINIC | Age: 76
End: 2021-09-29
Payer: MEDICARE

## 2021-09-29 VITALS
WEIGHT: 184 LBS | HEIGHT: 63 IN | HEART RATE: 69 BPM | TEMPERATURE: 98 F | SYSTOLIC BLOOD PRESSURE: 138 MMHG | RESPIRATION RATE: 18 BRPM | BODY MASS INDEX: 32.6 KG/M2 | DIASTOLIC BLOOD PRESSURE: 68 MMHG

## 2021-09-29 DIAGNOSIS — N18.31 STAGE 3A CHRONIC KIDNEY DISEASE (HCC): ICD-10-CM

## 2021-09-29 DIAGNOSIS — Z00.00 ENCOUNTER FOR ANNUAL HEALTH EXAMINATION: ICD-10-CM

## 2021-09-29 DIAGNOSIS — Z98.890 H/O CAROTID ENDARTERECTOMY: ICD-10-CM

## 2021-09-29 DIAGNOSIS — I10 ESSENTIAL HYPERTENSION: ICD-10-CM

## 2021-09-29 DIAGNOSIS — H25.13 AGE-RELATED NUCLEAR CATARACT OF BOTH EYES: ICD-10-CM

## 2021-09-29 DIAGNOSIS — C69.91 OCULAR MELANOMA, RIGHT (HCC): ICD-10-CM

## 2021-09-29 DIAGNOSIS — Z00.00 MEDICARE ANNUAL WELLNESS VISIT, INITIAL: Primary | ICD-10-CM

## 2021-09-29 DIAGNOSIS — E55.9 VITAMIN D DEFICIENCY: ICD-10-CM

## 2021-09-29 DIAGNOSIS — E78.5 HYPERLIPIDEMIA, UNSPECIFIED HYPERLIPIDEMIA TYPE: ICD-10-CM

## 2021-09-29 DIAGNOSIS — Z13.31 DEPRESSION SCREENING: ICD-10-CM

## 2021-09-29 DIAGNOSIS — F32.A MILD DEPRESSION: ICD-10-CM

## 2021-09-29 PROBLEM — R82.81 PYURIA: Status: RESOLVED | Noted: 2020-07-08 | Resolved: 2021-09-29

## 2021-09-29 PROBLEM — K59.1 FUNCTIONAL DIARRHEA: Status: RESOLVED | Noted: 2021-04-08 | Resolved: 2021-09-29

## 2021-09-29 PROBLEM — L03.316 CELLULITIS, UMBILICAL: Status: RESOLVED | Noted: 2017-04-25 | Resolved: 2021-09-29

## 2021-09-29 PROBLEM — H43.392 FLOATERS, LEFT: Status: RESOLVED | Noted: 2019-10-09 | Resolved: 2021-09-29

## 2021-09-29 PROCEDURE — G0438 PPPS, INITIAL VISIT: HCPCS | Performed by: INTERNAL MEDICINE

## 2021-09-29 NOTE — ASSESSMENT & PLAN NOTE
Normal exam.  Labs as ordered. Skin check normal.  Multiple scattered nevi present, patient is monitored per Dr. Prashant Blackmon. Breast exam completed–no palpable abnormalities, discharge from the nipples or axillary adenopathy.   Mammogram just completed looks n

## 2021-09-29 NOTE — PATIENT INSTRUCTIONS
Problem List Items Addressed This Visit        Unprioritized    Age-related nuclear cataract of both eyes     History of cataract, this seems quite significant on the right eye but due to loss of vision from radiation secondary to melanoma no intervention Hyperlipidemia     Lipid panel and liver function tests discussed. Continues to have slightly elevated triglycerides. Liver function test look normal.  LDL levels look normal.  Continue on pravastatin at 40 mg daily.   Advised to reduce starches, sugars, since her move to the longterm facility. She has friends and a support group at this time.   Continue on the same dose of citalopram.         Relevant Orders    VITAMIN B12    Ocular melanoma, right ([facility] [address]     Patient is status post radiation treatment to medical reasons -      Ultrasound Screening for Abdominal Aortic Aneurysm (AAA) Covered once in a lifetime for one of the following risk factors   • Men who are 73-68 years old and have ever smoked   • Anyone with a family history -     Colorectal Cancer S -  No recommendations at this time    Zoster Not covered by Medicare Part B; may be covered with your pharmacy  prescription benefits -  Zoster Vaccines(1 of 2) Never done        Annual Monitoring of Persistent Medications (ACE/ARB, digoxin diuretics, anti

## 2021-09-29 NOTE — ASSESSMENT & PLAN NOTE
CKD stage III, EGFR stable at this time. Ultrasound of the kidneys without any significant abnormalities. Nephrology evaluation completed per Dr Delmar Perry. Continue to monitor. Advised to avoid Advil, Aleve, ibuprofen, Motrin like medications.   Only pain

## 2021-09-29 NOTE — ASSESSMENT & PLAN NOTE
Blood pressure 138/68, pulse 69, temperature 97.6 °F (36.4 °C), temperature source Temporal, resp. rate 18, height 5' 3\" (1.6 m), weight 184 lb (83.5 kg), not currently breastfeeding.      Blood pressures look stable, well controlled on amlodipine at 2.5 m

## 2021-09-29 NOTE — ASSESSMENT & PLAN NOTE
Patient is status post right carotid endarterectomy. Ultrasound of the carotid completed in 2019 showed stable appearing right carotid. Slight atherosclerosis in the left. No signs of occlusion. We will repeat a carotid Doppler next year.

## 2021-09-29 NOTE — ASSESSMENT & PLAN NOTE
History of cataract, this seems quite significant on the right eye but due to loss of vision from radiation secondary to melanoma no intervention needed at this time. Monitor for any worsening on the left side. Advised to see Dr. Jay Jay Gayle in June 2022.

## 2021-09-29 NOTE — ASSESSMENT & PLAN NOTE
This has been supplemented in the past, advised to take an over-the-counter vitamin D 2000 units daily. Recheck labs with the next blood draw.

## 2021-09-29 NOTE — ASSESSMENT & PLAN NOTE
Lipid panel and liver function tests discussed. Continues to have slightly elevated triglycerides. Liver function test look normal.  LDL levels look normal.  Continue on pravastatin at 40 mg daily.   Advised to reduce starches, sugars, desserts in the die

## 2021-09-29 NOTE — PROGRESS NOTES
HPI:   Erlin Ashley is a 67year old female who presents for a Medicare Initial Annual Wellness visit (Once after 12 month Medicare anniversary) .     Her last annual assessment has been over 1 year: Annual Physical due on 09/29/2022         Fall/Ris CAGE Alcohol Screen Please Preform Now    Cage NOT Performed in the last 6 months, please do assessment! Last Cage score was 0 on 10/31/2018.         Patient Care Team: Patient Care Team:  Barry Storm MD as PCP - General (Internal Medicine)    [facility] B12) 1000 MCG Oral Tab, Take 1,000 mcg by mouth daily. loratadine (CLARITIN) 10 MG Oral Tab, Take 10 mg by mouth daily. folic acid (FOLVITE) 508 MCG Oral Tab, Take 400 mcg by mouth daily. Acetaminophen (TYLENOL EXTRA STRENGTH OR), Take  by mouth.   Multi discharge or itching, no complaint of urinary incontinence   MUSCULOSKELETAL: denies back pain  NEURO: denies headaches  PSYCHE: denies depression or anxiety  HEMATOLOGIC: denies hx of anemia  ENDOCRINE: denies thyroid history  ALL/ASTHMA: denies hx of all Constitutional:       Appearance: Normal appearance. She is well-developed. She is obese. HENT:      Head: Normocephalic.       Right Ear: Tympanic membrane and external ear normal.      Left Ear: Tympanic membrane and external ear normal.      Nose: No distributed.  External genitalia is unremarkable. Glands do appear to be normal.  Perineum is unremarkable.  No perianal abnormalities.    Urethra is normal in appearance, without erythema.  Urethra meatus is normal.    Pelvic exam completed- uterus and cer OTHER RELEVANT CHRONIC CONDITIONS:   Danilo Barrios is a 67year old female who presents for a Medicare Assessment.      Problem List Items Addressed This Visit        Unprioritized    Age-related nuclear cataract of both eyes     History of cataract, th carotid. Slight atherosclerosis in the left. No signs of occlusion. We will repeat a carotid Doppler next year. Hyperlipidemia     Lipid panel and liver function tests discussed. Continues to have slightly elevated triglycerides.   Liver functio apart.  May get this from the local pharmacy. Mild depression (HCC)     Chronic mild depressive disorder, much better since her move to the longterm facility. She has friends and a support group at this time.   Continue on the same dose of ci reassured. Reinforced healthy diet, lifestyle, and exercise. Return in 4 months (on 1/29/2022).      Antonio Marquez MD, 9/29/2021     General Health     In the past six months, have you lost more than 10 pounds without trying?: 2 - No  Has your appetite ages 52-80; only need ONE of the following:    Colonoscopy   Covered every 10 years    Covered every 2 years if patient is at high risk or previous colonoscopy was abnormal -    No recommendations at this time    Flexible Sigmoidoscopy   Covered every 4 ye Annually Lab Results   Component Value Date    K 4.4 07/09/2021         Creatinine   Annually Lab Results   Component Value Date    CREATSERUM 1.34 (H) 07/09/2021         BUN Annually Lab Results   Component Value Date    BUN 20 (H) 07/09/2021       Drug S

## 2021-09-29 NOTE — ASSESSMENT & PLAN NOTE
Patient is status post radiation treatment to the right eye 2010  for a malignant melanoma. 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 She is monitored per Dr. Maurizio Gray on a yearly basis, last evaluated in December 2020.   No significant abnormalities noted at t

## 2021-10-14 ENCOUNTER — OFFICE VISIT (OUTPATIENT)
Dept: DERMATOLOGY CLINIC | Facility: CLINIC | Age: 76
End: 2021-10-14
Payer: MEDICARE

## 2021-10-14 DIAGNOSIS — L82.1 SEBORRHEIC KERATOSES: ICD-10-CM

## 2021-10-14 DIAGNOSIS — C69.90 OCULAR MELANOMA, UNSPECIFIED LATERALITY (HCC): ICD-10-CM

## 2021-10-14 DIAGNOSIS — B37.2 INTERTRIGINOUS CANDIDIASIS: ICD-10-CM

## 2021-10-14 DIAGNOSIS — D23.9 BENIGN NEOPLASM OF SKIN, UNSPECIFIED LOCATION: ICD-10-CM

## 2021-10-14 DIAGNOSIS — L57.0 AK (ACTINIC KERATOSIS): ICD-10-CM

## 2021-10-14 DIAGNOSIS — D48.5 NEOPLASM OF UNCERTAIN BEHAVIOR OF SKIN: Primary | ICD-10-CM

## 2021-10-14 DIAGNOSIS — D22.9 MULTIPLE NEVI: ICD-10-CM

## 2021-10-14 PROCEDURE — 99213 OFFICE O/P EST LOW 20 MIN: CPT | Performed by: DERMATOLOGY

## 2021-10-14 PROCEDURE — 11102 TANGNTL BX SKIN SINGLE LES: CPT | Performed by: DERMATOLOGY

## 2021-10-14 PROCEDURE — 88305 TISSUE EXAM BY PATHOLOGIST: CPT | Performed by: DERMATOLOGY

## 2021-10-15 ENCOUNTER — TELEPHONE (OUTPATIENT)
Dept: DERMATOLOGY CLINIC | Facility: CLINIC | Age: 76
End: 2021-10-15

## 2021-10-22 ENCOUNTER — TELEPHONE (OUTPATIENT)
Dept: DERMATOLOGY CLINIC | Facility: CLINIC | Age: 76
End: 2021-10-22

## 2021-10-22 NOTE — TELEPHONE ENCOUNTER
Patient called    Asking to speak to RN about biopsy area. Red [place] around th area.      Also asking for test results    Please call

## 2021-10-22 NOTE — TELEPHONE ENCOUNTER
Pt informed of biopsy results to left breast (benign) she did not get her letter yet. States she has a \"pinkish [place]\" around the biopsy site. Still looks scabby from cautery. Denies pain, swelling, pus, fever, redness, warmth.  Washes daily with soap/wa

## 2021-10-24 NOTE — PROGRESS NOTES
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

## 2021-10-24 NOTE — PROGRESS NOTES
Mary Lou Sheth is a 67year old female. HPI:     CC:  Patient presents with:  Full Skin Exam: Hx of melanoma. LOV 7/8/21. Pt presents for full body check. No new concerns.         Allergies:  Adhesive Tape    HISTORY:    Past Medical History:   Zeus Reis succinate 25 MG Oral Tablet 24 Hr Take 1 tablet (25 mg total) by mouth daily. 90 tablet 2   • citalopram hydrobromide 10 MG Oral Tab Take 1 tablet (10 mg total) by mouth once daily.  90 tablet 1   • pravastatin 40 MG Oral Tab Take 1 tablet (40 mg total) by Tobacco Use      Smoking status: Former Smoker        Packs/day: 0.50        Years: 14.00        Pack years: 7        Types: Cigarettes        Quit date: 1979        Years since quittin.8      Smokeless tobacco: Never Used    Vaping Use      Vapin on file      Attends Orthodox Services: Not on file      Active Member of Clubs or Organizations: Not on file      Attends Club or Organization Meetings: Not on file      Marital Status: Not on file  Intimate Partner Violence:       Fear of Current or Ex- reviewed. Updated and new information noted in current visit. Patient presents with concerns above. Overall doing well, stressed. Patient has been in their usual state of health. History, medications, allergies reviewed as noted.       ROS:  Denies pigment network r/o atypical  Shave/ tangential biopsy performed, operative note and consent in chart further plans pending pathology    No significant changes in other  skin lesions    Previous waxy tan keratotic of the right flank stable.   Extensive ravi plan, counseling 10minutes My total time spent caring for the patient on the day of the encounter: 25 minutes    The patient indicates understanding of these issues and agrees to the plan. The patient is asked to return as noted in follow-up/ above.     Leela Santos

## 2021-10-28 ENCOUNTER — OFFICE VISIT (OUTPATIENT)
Dept: DERMATOLOGY CLINIC | Facility: CLINIC | Age: 76
End: 2021-10-28
Payer: MEDICARE

## 2021-10-28 DIAGNOSIS — T14.8XXA WOUND INFECTION: Primary | ICD-10-CM

## 2021-10-28 DIAGNOSIS — L08.9 WOUND INFECTION: Primary | ICD-10-CM

## 2021-10-28 PROCEDURE — 99212 OFFICE O/P EST SF 10 MIN: CPT | Performed by: DERMATOLOGY

## 2021-10-28 RX ORDER — CEPHALEXIN 500 MG/1
500 CAPSULE ORAL 2 TIMES DAILY
Qty: 10 CAPSULE | Refills: 0 | Status: SHIPPED | OUTPATIENT
Start: 2021-10-28 | End: 2022-01-13 | Stop reason: ALTCHOICE

## 2021-11-02 ENCOUNTER — TELEPHONE (OUTPATIENT)
Dept: DERMATOLOGY CLINIC | Facility: CLINIC | Age: 76
End: 2021-11-02

## 2021-11-02 NOTE — TELEPHONE ENCOUNTER
This is only for 5 days. How is the breast lesion doing?   No she should not stop the cephalexin okay to get her booster since this is for superficial lesion not a serious infection she is not having any fevers or chills okay to have immunizations

## 2021-11-02 NOTE — TELEPHONE ENCOUNTER
Patient calling she would like to know if is okay to take the booster covid shot she is scheduled for 11-4-21 since she is taking antibiotics. Patient would also like to know if she continues with medication or does she stop it. Please advise.

## 2021-11-02 NOTE — TELEPHONE ENCOUNTER
Pt informed of all below recommendations, voiced understanding. She cont. On keflex. States lesion is doing well, no more scab/crust - site is closed.  She does cont using H2O2, states she was instructed to use it daily at last visit - wants to know if she

## 2021-11-07 NOTE — TELEPHONE ENCOUNTER
Please review; protocol failed/no protocol    Requested Prescriptions   Pending Prescriptions Disp Refills    AMLODIPINE 2.5 MG Oral Tab [Pharmacy Med Name: AMLODIPINE TAB 2.5MG] 90 tablet 1     Sig: TAKE 1 TABLET DAILY (DOSE  DECREASE)        Hypertensive

## 2021-11-08 RX ORDER — AMLODIPINE BESYLATE 2.5 MG/1
2.5 TABLET ORAL DAILY
Qty: 90 TABLET | Refills: 1 | Status: SHIPPED | OUTPATIENT
Start: 2021-11-08 | End: 2022-02-05

## 2021-11-08 NOTE — PROGRESS NOTES
Lorenza Tilley is a 67year old female. Patient presents with:  Derm Problem: LOV 10/14/21. Patient complains of redness surrounding bx site to left breast from LOV. 1/10 pain. Denies drainge or swelling.  DX- Compound nevus            Adhesive Tape Years: 14.00        Pack years: 7        Types: Cigarettes        Quit date: 1979        Years since quittin.8      Smokeless tobacco: Never Used    Vaping Use      Vaping Use: Never used    Alcohol use: No      Alcohol/week: 0.0 standard drinks Surgical History:   Procedure Laterality Date   • CHOLECYSTECTOMY  1996   • COLONOSCOPY     • HYSTERECTOMY  1992   • OTHER SURGICAL HISTORY  2010    Melanoma of Choroid: Radiation plaque for 7 days; treated     Social History    Socioeconomic History Last Year: Not on file  Transportation Needs:       Lack of Transportation (Medical): Not on file      Lack of Transportation (Non-Medical):  Not on file  Physical Activity:       Days of Exercise per Week: Not on file      Minutes of Exercise per Session: noted in current visit. Follow-up post biopsy notes discomfort redness surrounding this and yellowish drainage. Has been applying Polysporin no bandage for the last few days. Patient presents with concerns above.       ROS:    Denies any other syste regarding any confusion resulting from errors in recognition. No orders of the defined types were placed in this encounter.       Meds & Refills for this Visit:   Requested Prescriptions     Signed Prescriptions Disp Refills   • cephalexin 500 MG Oral Ca no chest pain/no palpitations/no peripheral edema

## 2021-11-24 ENCOUNTER — LAB ENCOUNTER (OUTPATIENT)
Dept: LAB | Facility: HOSPITAL | Age: 76
End: 2021-11-24
Attending: INTERNAL MEDICINE
Payer: MEDICARE

## 2021-11-24 DIAGNOSIS — N18.32 STAGE 3B CHRONIC KIDNEY DISEASE (HCC): ICD-10-CM

## 2021-11-24 PROCEDURE — 80069 RENAL FUNCTION PANEL: CPT

## 2021-11-24 PROCEDURE — 36415 COLL VENOUS BLD VENIPUNCTURE: CPT

## 2021-11-26 ENCOUNTER — TELEPHONE (OUTPATIENT)
Dept: INTERNAL MEDICINE CLINIC | Facility: CLINIC | Age: 76
End: 2021-11-26

## 2021-11-26 DIAGNOSIS — Z20.822 CLOSE EXPOSURE TO COVID-19 VIRUS: Primary | ICD-10-CM

## 2021-11-26 NOTE — TELEPHONE ENCOUNTER
Patient states she visited her  at a nursing home on Wednesday morning. She received a call Wednesday evening from nursing home that her  was taken to the hospital for emesis, hyperglycemia and tested positive for Covid.   Patient was informe

## 2021-11-27 ENCOUNTER — TELEPHONE (OUTPATIENT)
Dept: NEPHROLOGY | Facility: CLINIC | Age: 76
End: 2021-11-27

## 2021-11-27 DIAGNOSIS — N18.32 STAGE 3B CHRONIC KIDNEY DISEASE (HCC): Primary | ICD-10-CM

## 2021-11-27 NOTE — TELEPHONE ENCOUNTER
Kidney function remains stable. If doing well. CPM.  Repeat CBC, renal panel in 3 mo and see. Make STO.

## 2021-11-28 ENCOUNTER — LAB ENCOUNTER (OUTPATIENT)
Dept: LAB | Facility: HOSPITAL | Age: 76
End: 2021-11-28
Attending: INTERNAL MEDICINE
Payer: MEDICARE

## 2021-12-01 ENCOUNTER — TELEPHONE (OUTPATIENT)
Dept: INTERNAL MEDICINE CLINIC | Facility: CLINIC | Age: 76
End: 2021-12-01

## 2021-12-01 NOTE — TELEPHONE ENCOUNTER
Patient was calling for her Covid results. Relayed to her that they are negative. Virus not detected. Patient is going to work with her son to get active on [address] She will call with any questions.

## 2021-12-20 ENCOUNTER — PATIENT OUTREACH (OUTPATIENT)
Dept: CASE MANAGEMENT | Age: 76
End: 2021-12-20

## 2022-01-04 ENCOUNTER — OFFICE VISIT (OUTPATIENT)
Dept: PODIATRY CLINIC | Facility: CLINIC | Age: 77
End: 2022-01-04
Payer: MEDICARE

## 2022-01-04 DIAGNOSIS — M79.675 PAIN DUE TO ONYCHOMYCOSIS OF TOENAIL OF LEFT FOOT: ICD-10-CM

## 2022-01-04 DIAGNOSIS — B35.1 PAIN DUE TO ONYCHOMYCOSIS OF TOENAIL OF RIGHT FOOT: Primary | ICD-10-CM

## 2022-01-04 DIAGNOSIS — M79.674 PAIN DUE TO ONYCHOMYCOSIS OF TOENAIL OF RIGHT FOOT: Primary | ICD-10-CM

## 2022-01-04 DIAGNOSIS — B35.1 PAIN DUE TO ONYCHOMYCOSIS OF TOENAIL OF LEFT FOOT: ICD-10-CM

## 2022-01-04 PROCEDURE — 11721 DEBRIDE NAIL 6 OR MORE: CPT | Performed by: PODIATRIST

## 2022-01-04 NOTE — PROGRESS NOTES
HPI:    Patient ID: Niecy Ervin is a 67year old female. This 21-year-old female presents with recurrent symptoms associated with all of her toenails. Last time I saw this patient was September 8. She reports relief by previous treatment.       ROS of all 10 nails was accomplished today without incident. I reduced nail, subungual debris, and some keratosis. No ulcerations or infections were noted upon debridement.   Anticipate relief will see patient for care if and when symptoms redevelop         A

## 2022-01-13 ENCOUNTER — OFFICE VISIT (OUTPATIENT)
Dept: DERMATOLOGY CLINIC | Facility: CLINIC | Age: 77
End: 2022-01-13
Payer: MEDICARE

## 2022-01-13 DIAGNOSIS — D23.9 BENIGN NEOPLASM OF SKIN, UNSPECIFIED LOCATION: ICD-10-CM

## 2022-01-13 DIAGNOSIS — L82.1 SEBORRHEIC KERATOSES: ICD-10-CM

## 2022-01-13 DIAGNOSIS — Z86.018 HISTORY OF DYSPLASTIC NEVUS: ICD-10-CM

## 2022-01-13 DIAGNOSIS — C69.90 OCULAR MELANOMA, UNSPECIFIED LATERALITY (HCC): ICD-10-CM

## 2022-01-13 DIAGNOSIS — D22.9 MULTIPLE NEVI: Primary | ICD-10-CM

## 2022-01-13 PROCEDURE — 99213 OFFICE O/P EST LOW 20 MIN: CPT | Performed by: DERMATOLOGY

## 2022-01-24 NOTE — PROGRESS NOTES
Vahid Quiroz is a 67year old female. HPI:     CC:  Patient presents with:  Derm Problem: LOV 10/2021. Pt presents for full body check. Area removed to left breast at LOV healing but area remains red. Denies bleeding.  Personal hx of melanoma in 2010 Current Outpatient Medications   Medication Sig Dispense Refill   • amLODIPine 2.5 MG Oral Tab Take 1 tablet (2.5 mg total) by mouth daily. 90 tablet 1   • metoprolol succinate 25 MG Oral Tablet 24 Hr Take 1 tablet (25 mg total) by mouth daily.  90 tabl on file      Number of children: Not on file      Years of education: Not on file      Highest education level: Not on file    Occupational History      Not on file    Tobacco Use      Smoking status: Former Smoker        Packs/day: 0.50        Years: 15. 0 per NG   • Stroke Maternal Grandmother 69        Cause of death; per NG   • Diabetes Other         Aunt; per NG   • Lipids Other         Family H/o Hyperlipidemia; per NG   • Hypertension Maternal Uncle         per NG   • Breast Cancer Paternal Aunt including conjunctival mucosa, eyelids, lips external ears, back, chest,/ breasts, axillae,  abdomen, arms, legs, palms.      Multiple light to medium brown, well marginated, uniformly pigmented, macules and papules 6 mm and less scattered on exam. pigmente thigh observe. Unchanged stable no other new suspicious lesions      History of ocular melanoma stable continues to follow-up at [facility] annually    Continue regular follow-up no active AK's.   History of AK's continue careful sun protection stable     History

## 2022-02-05 ENCOUNTER — TELEPHONE (OUTPATIENT)
Dept: INTERNAL MEDICINE CLINIC | Facility: CLINIC | Age: 77
End: 2022-02-05

## 2022-02-05 RX ORDER — TRIAMTERENE AND HYDROCHLOROTHIAZIDE 37.5; 25 MG/1; MG/1
CAPSULE ORAL
Qty: 12 CAPSULE | Refills: 1 | Status: SHIPPED | OUTPATIENT
Start: 2022-02-05 | End: 2022-02-28

## 2022-02-05 RX ORDER — PRAVASTATIN SODIUM 40 MG
40 TABLET ORAL NIGHTLY
Qty: 90 TABLET | Refills: 1 | Status: SHIPPED | OUTPATIENT
Start: 2022-02-05

## 2022-02-05 RX ORDER — CITALOPRAM 10 MG/1
10 TABLET ORAL
Qty: 90 TABLET | Refills: 1 | Status: SHIPPED | OUTPATIENT
Start: 2022-02-05

## 2022-02-05 RX ORDER — AMLODIPINE BESYLATE 2.5 MG/1
2.5 TABLET ORAL DAILY
Qty: 90 TABLET | Refills: 1 | Status: SHIPPED | OUTPATIENT
Start: 2022-02-05 | End: 2022-02-28

## 2022-02-22 ENCOUNTER — LAB ENCOUNTER (OUTPATIENT)
Dept: LAB | Facility: HOSPITAL | Age: 77
End: 2022-02-22
Attending: INTERNAL MEDICINE
Payer: MEDICARE

## 2022-02-22 DIAGNOSIS — E55.9 VITAMIN D DEFICIENCY: ICD-10-CM

## 2022-02-22 DIAGNOSIS — I10 ESSENTIAL HYPERTENSION: ICD-10-CM

## 2022-02-22 DIAGNOSIS — N18.32 STAGE 3B CHRONIC KIDNEY DISEASE (HCC): ICD-10-CM

## 2022-02-22 DIAGNOSIS — F32.A MILD DEPRESSION: ICD-10-CM

## 2022-02-22 DIAGNOSIS — E78.5 HYPERLIPIDEMIA, UNSPECIFIED HYPERLIPIDEMIA TYPE: ICD-10-CM

## 2022-02-22 LAB
ALBUMIN SERPL-MCNC: 3.7 G/DL (ref 3.4–5)
ALBUMIN/GLOB SERPL: 1.1 {RATIO} (ref 1–2)
ALP LIVER SERPL-CCNC: 77 U/L
ALT SERPL-CCNC: 21 U/L
ANION GAP SERPL CALC-SCNC: 5 MMOL/L (ref 0–18)
AST SERPL-CCNC: 22 U/L (ref 15–37)
BASOPHILS # BLD AUTO: 0.06 X10(3) UL (ref 0–0.2)
BASOPHILS NFR BLD AUTO: 1.3 %
BILIRUB SERPL-MCNC: 0.4 MG/DL (ref 0.1–2)
BILIRUB UR QL: NEGATIVE
BUN BLD-MCNC: 19 MG/DL (ref 7–18)
BUN/CREAT SERPL: 14.4 (ref 10–20)
CALCIUM BLD-MCNC: 9.2 MG/DL (ref 8.5–10.1)
CHLORIDE SERPL-SCNC: 106 MMOL/L (ref 98–112)
CHOLEST SERPL-MCNC: 191 MG/DL (ref ?–200)
CLARITY UR: CLEAR
CO2 SERPL-SCNC: 29 MMOL/L (ref 21–32)
COLOR UR: YELLOW
CREAT BLD-MCNC: 1.32 MG/DL
DEPRECATED RDW RBC AUTO: 41.1 FL (ref 35.1–46.3)
EOSINOPHIL # BLD AUTO: 0.24 X10(3) UL (ref 0–0.7)
EOSINOPHIL NFR BLD AUTO: 5.1 %
ERYTHROCYTE [DISTWIDTH] IN BLOOD BY AUTOMATED COUNT: 11.9 % (ref 11–15)
FASTING PATIENT LIPID ANSWER: YES
FASTING STATUS PATIENT QL REPORTED: YES
GLOBULIN PLAS-MCNC: 3.5 G/DL (ref 2.8–4.4)
GLUCOSE BLD-MCNC: 94 MG/DL (ref 70–99)
GLUCOSE UR-MCNC: NEGATIVE MG/DL
HCT VFR BLD AUTO: 38.7 %
HDLC SERPL-MCNC: 46 MG/DL (ref 40–59)
HGB BLD-MCNC: 12.2 G/DL
HGB UR QL STRIP.AUTO: NEGATIVE
IMM GRANULOCYTES # BLD AUTO: 0.02 X10(3) UL (ref 0–1)
IMM GRANULOCYTES NFR BLD: 0.4 %
KETONES UR-MCNC: NEGATIVE MG/DL
LDLC SERPL CALC-MCNC: 106 MG/DL (ref ?–100)
LEUKOCYTE ESTERASE UR QL STRIP.AUTO: NEGATIVE
LYMPHOCYTES # BLD AUTO: 1.25 X10(3) UL (ref 1–4)
LYMPHOCYTES NFR BLD AUTO: 26.4 %
MCH RBC QN AUTO: 29.5 PG (ref 26–34)
MCHC RBC AUTO-ENTMCNC: 31.5 G/DL (ref 31–37)
MCV RBC AUTO: 93.5 FL
MONOCYTES # BLD AUTO: 0.4 X10(3) UL (ref 0.1–1)
MONOCYTES NFR BLD AUTO: 8.5 %
NEUTROPHILS # BLD AUTO: 2.76 X10 (3) UL (ref 1.5–7.7)
NEUTROPHILS # BLD AUTO: 2.76 X10(3) UL (ref 1.5–7.7)
NEUTROPHILS NFR BLD AUTO: 58.3 %
NITRITE UR QL STRIP.AUTO: NEGATIVE
NONHDLC SERPL-MCNC: 145 MG/DL (ref ?–130)
OSMOLALITY SERPL CALC.SUM OF ELEC: 292 MOSM/KG (ref 275–295)
PH UR: 7 [PH] (ref 5–8)
PHOSPHATE SERPL-MCNC: 3.3 MG/DL (ref 2.5–4.9)
PLATELET # BLD AUTO: 355 10(3)UL (ref 150–450)
POTASSIUM SERPL-SCNC: 4.4 MMOL/L (ref 3.5–5.1)
PROT SERPL-MCNC: 7.2 G/DL (ref 6.4–8.2)
PROT UR-MCNC: NEGATIVE MG/DL
RBC # BLD AUTO: 4.14 X10(6)UL
SODIUM SERPL-SCNC: 140 MMOL/L (ref 136–145)
SP GR UR STRIP: 1 (ref 1–1.03)
TRIGL SERPL-MCNC: 225 MG/DL (ref 30–149)
TSI SER-ACNC: 1.07 MIU/ML (ref 0.36–3.74)
UROBILINOGEN UR STRIP-ACNC: <2
VIT B12 SERPL-MCNC: >2000 PG/ML (ref 193–986)
VIT D+METAB SERPL-MCNC: 28.7 NG/ML (ref 30–100)
VLDLC SERPL CALC-MCNC: 38 MG/DL (ref 0–30)
WBC # BLD AUTO: 4.7 X10(3) UL (ref 4–11)

## 2022-02-22 PROCEDURE — 80053 COMPREHEN METABOLIC PANEL: CPT

## 2022-02-22 PROCEDURE — 84443 ASSAY THYROID STIM HORMONE: CPT

## 2022-02-22 PROCEDURE — 36415 COLL VENOUS BLD VENIPUNCTURE: CPT

## 2022-02-22 PROCEDURE — 80061 LIPID PANEL: CPT

## 2022-02-22 PROCEDURE — 81003 URINALYSIS AUTO W/O SCOPE: CPT

## 2022-02-22 PROCEDURE — 82306 VITAMIN D 25 HYDROXY: CPT

## 2022-02-22 PROCEDURE — 85025 COMPLETE CBC W/AUTO DIFF WBC: CPT

## 2022-02-22 PROCEDURE — 82607 VITAMIN B-12: CPT

## 2022-02-22 PROCEDURE — 84100 ASSAY OF PHOSPHORUS: CPT

## 2022-02-28 ENCOUNTER — OFFICE VISIT (OUTPATIENT)
Dept: INTERNAL MEDICINE CLINIC | Facility: CLINIC | Age: 77
End: 2022-02-28
Payer: MEDICARE

## 2022-02-28 ENCOUNTER — OFFICE VISIT (OUTPATIENT)
Dept: NEPHROLOGY | Facility: CLINIC | Age: 77
End: 2022-02-28
Payer: MEDICARE

## 2022-02-28 VITALS
HEART RATE: 73 BPM | HEIGHT: 63 IN | BODY MASS INDEX: 32.96 KG/M2 | TEMPERATURE: 97 F | WEIGHT: 186 LBS | SYSTOLIC BLOOD PRESSURE: 160 MMHG | DIASTOLIC BLOOD PRESSURE: 64 MMHG | RESPIRATION RATE: 16 BRPM

## 2022-02-28 VITALS
SYSTOLIC BLOOD PRESSURE: 113 MMHG | HEIGHT: 63 IN | DIASTOLIC BLOOD PRESSURE: 66 MMHG | WEIGHT: 184 LBS | BODY MASS INDEX: 32.6 KG/M2 | HEART RATE: 76 BPM

## 2022-02-28 DIAGNOSIS — I10 ESSENTIAL HYPERTENSION: Primary | ICD-10-CM

## 2022-02-28 DIAGNOSIS — E78.5 HYPERLIPIDEMIA, UNSPECIFIED HYPERLIPIDEMIA TYPE: ICD-10-CM

## 2022-02-28 DIAGNOSIS — N18.31 STAGE 3A CHRONIC KIDNEY DISEASE (HCC): ICD-10-CM

## 2022-02-28 DIAGNOSIS — N18.32 STAGE 3B CHRONIC KIDNEY DISEASE (HCC): Primary | ICD-10-CM

## 2022-02-28 DIAGNOSIS — E55.9 VITAMIN D DEFICIENCY: ICD-10-CM

## 2022-02-28 PROCEDURE — 99214 OFFICE O/P EST MOD 30 MIN: CPT | Performed by: INTERNAL MEDICINE

## 2022-02-28 RX ORDER — AMLODIPINE BESYLATE 5 MG/1
5 TABLET ORAL DAILY
Qty: 90 TABLET | Refills: 1 | Status: SHIPPED | OUTPATIENT
Start: 2022-02-28 | End: 2023-02-23

## 2022-02-28 RX ORDER — TRIAMTERENE AND HYDROCHLOROTHIAZIDE 37.5; 25 MG/1; MG/1
CAPSULE ORAL
Qty: 30 CAPSULE | Refills: 1 | Status: SHIPPED | OUTPATIENT
Start: 2022-02-28

## 2022-02-28 RX ORDER — ERGOCALCIFEROL 1.25 MG/1
50000 CAPSULE ORAL WEEKLY
Qty: 12 CAPSULE | Refills: 0 | Status: SHIPPED | OUTPATIENT
Start: 2022-02-28 | End: 2022-03-30

## 2022-02-28 RX ORDER — METOPROLOL SUCCINATE 25 MG/1
25 TABLET, EXTENDED RELEASE ORAL DAILY
Qty: 90 TABLET | Refills: 2 | Status: SHIPPED | OUTPATIENT
Start: 2022-02-28

## 2022-02-28 NOTE — ASSESSMENT & PLAN NOTE
Lipid panel shows significant elevations in the triglyceride levels. LDL levels look stable on pravastatin 40 mg daily. Liver function test look stable. She admits to eating outside yesterday which probably caused the elevation in the triglyceride levels. Advised to reduce starches in the diet, sugars and desserts. Walk for about 20 minutes daily. Recheck labs with the next blood draw.

## 2022-02-28 NOTE — ASSESSMENT & PLAN NOTE
Persistent but stable chronic kidney disease stage III. She does not take any Advil, Aleve, ibuprofen, Motrin like medications over-the-counter. Nephrology evaluation reviewed. Continue to pay attention to diet and reduce salt as well as drink plenty of fluids. Recheck labs and follow-up visit in about 4 to 6 months.

## 2022-03-01 ENCOUNTER — TELEPHONE (OUTPATIENT)
Dept: INTERNAL MEDICINE CLINIC | Facility: CLINIC | Age: 77
End: 2022-03-01

## 2022-03-01 NOTE — PROGRESS NOTES
02/28/22        Patient: Teo Vance   YOB: 1945   Date of Visit: 2/28/2022       Dear  Dr. Mary Jane Mack MD,      Thank you for referring Teo Vance to my practice. Please find my assessment and plan below. As you know she is a 79-year-old female with a history of hypertension, hypercholesterolemia, melanoma involving her eyes, depression who I now had the pleasure of seeing for follow-up of chronic kidney disease stage III. Overall the patient states she is doing well without any chest pain, shortness of breath, GI or urinary tract symptoms. She did relate however that her  recently passed away in December 2021. He had been residing in a nursing home for a couple of years. She has been checking her blood pressures regularly and systolics usually in the 1 teens to 008J with diastolics in the 41Q and 52B. On physical exam her blood pressure is 113/66 with a pulse of 76 and she weighed 184 pounds. Her neck was supple without JVD. Lungs were clear. Heart revealed a regular rate and rhythm with an S4 but no gallops, murmurs or rubs. Abdomen was soft, flat, nontender without organomegaly, masses or bruits. Extremities revealed no edema. I reviewed her most recent labs done on February 22, 2022. Creatinine stable 1.32 with an estimated GFR 39 cc/min. Electrolytes and hemoglobin were normal.  Urinalysis was unremarkable. I therefore informed the patient that overall her renal function is stable. Blood pressures are under good control. Continue current medications. She will continue to do CBC and renal panels quarterly. I will see her again in 6 months for follow-up or sooner if clinically indicated. Thank you again for allowing me to participate in the care of your patient. If you have any questions please feel free to call.            Sincerely,   Ysabel Lanza MD   [facility], [address] [place] [address], [place]  [address] [place] [address],[address] [id number]    Document electronically generated by:  Florette Gilford, MD

## 2022-03-01 NOTE — TELEPHONE ENCOUNTER
Patient calling as follow up to BP medication adjustment made by Dr. Stan Nicholas following elevated BP in office (x 2). Per patient, provider doubled amlodopine dosage to 5mg. Patient reports that was previously on this dose and experienced significant swelling in her feet which is in part why the dose was reduced at that time. Patient also had a visit with Dr. Bonita Kenney yesterday and in office BP was 113/66    BP today : 124/60 HR 68  Patient keeps a BP log for Dr. Bonita Kenney and provides the below 7 day values. Patient on the advice of Dr. Bonita Kenney would like to share this additional information and see if provider has alternate recommendations for BP medications. Patient would also like to confirm that increase in triamterene- hydrochlorothiazide frequency is also warranted. BP readings  2/23; 121/61  2/24  135/68  2/25   105/57(at home)     146/68 (health screening)  2/26   133/65      2/27    126/63    Patient is awaiting provider response, in the interim will continue with BP medications as PREVIOUSLY prescribed: Amlodipine 2.5 mg daily and triamterene/HCTZ 3705/25 once weekly. Patient scheduled for and will keep nurse visit for BP check on 3/10    Routing to provider for consideration. As an additional note, patient's Transportation company will not transport her to Drs new location.

## 2022-03-02 NOTE — TELEPHONE ENCOUNTER
Okay to keep the blood pressure medications add amlodipine 2.5 mg daily and Dyazide once a week. Follow-up blood pressure check as recommended at nurse visit. Ear Wedge Repair Text: A wedge excision was completed by carrying down an excision through the full thickness of the ear and cartilage with an inward facing Burow's triangle. The wound was then closed in a layered fashion.

## 2022-03-10 ENCOUNTER — NURSE ONLY (OUTPATIENT)
Dept: INTERNAL MEDICINE CLINIC | Facility: CLINIC | Age: 77
End: 2022-03-10
Payer: MEDICARE

## 2022-03-10 VITALS — SYSTOLIC BLOOD PRESSURE: 118 MMHG | DIASTOLIC BLOOD PRESSURE: 64 MMHG | HEART RATE: 70 BPM

## 2022-03-10 DIAGNOSIS — I10 ESSENTIAL HYPERTENSION: Primary | ICD-10-CM

## 2022-03-10 NOTE — PROGRESS NOTES
Patient was in today for a scheduled nurse visit to have her BP check per PCP Yoli Lindo. Patient name, , and allergies verified. Patient was asked to sit in the room for 10 minutes prior to having her BP check. BP was checked using a large cuff on her left arm. Arm was elevated at heart level. Blood Pressure was 118/64 and pulse was 70. Patient states that she did take her BP medications this AM. . Patient was told to go home and wait for further instructions from PCP regarding any changes that may be recommended by PCP. Patient verbalized understanding. Patient currently is taking medications below  metoprolol succinate ER 25 MG Oral Tablet 24 Hr, Take 1 tablet (25 mg total) by mouth daily. , Disp: 90 tablet, Rfl: 2  amLODIPine 5 MG Oral Tab, Take 1 tablet (5 mg total) by mouth daily. , Disp: 90 tablet, Rfl: 1  triamterene-hydroCHLOROthiazide (DYAZIDE) 37.5-25 MG Oral Cap, 1capsule 2 times a week, Disp: 30 capsule, Rfl: 1  ergocalciferol 1.25 MG (09332 UT) Oral Cap, Take 1 capsule (50,000 Units total) by mouth once a week., Disp: 12 capsule, Rfl: 0  pravastatin 40 MG Oral Tab, Take 1 tablet (40 mg total) by mouth nightly., Disp: 90 tablet, Rfl: 1  citalopram 10 MG Oral Tab, Take 1 tablet (10 mg total) by mouth once daily. , Disp: 90 tablet, Rfl: 1  aspirin 325 MG Oral Tab, Take 162.5 mg by mouth.  , Disp: , Rfl:   Calcium-Phosphorus-Vitamin D (CITRACAL +D3 OR), Take 1 tablet by mouth daily. , Disp: , Rfl:   Vitamin B-12 (VITAMIN B12) 1000 MCG Oral Tab, Take 1,000 mcg by mouth daily. , Disp: , Rfl:   loratadine (CLARITIN) 10 MG Oral Tab, Take 10 mg by mouth daily. , Disp: , Rfl:   folic acid (FOLVITE) 818 MCG Oral Tab, Take 400 mcg by mouth daily. , Disp: , Rfl:   Acetaminophen (TYLENOL EXTRA STRENGTH OR), Take  by mouth., Disp: , Rfl:   Multiple Vitamins-Minerals (CENTRUM SILVER) Oral Tab, Take 1 tablet by mouth daily. , Disp: , Rfl:     No current facility-administered medications on file prior to visit.

## 2022-05-04 ENCOUNTER — OFFICE VISIT (OUTPATIENT)
Dept: PODIATRY CLINIC | Facility: CLINIC | Age: 77
End: 2022-05-04
Payer: MEDICARE

## 2022-05-04 DIAGNOSIS — B35.1 PAIN DUE TO ONYCHOMYCOSIS OF TOENAIL OF RIGHT FOOT: Primary | ICD-10-CM

## 2022-05-04 DIAGNOSIS — M79.675 PAIN DUE TO ONYCHOMYCOSIS OF TOENAIL OF LEFT FOOT: ICD-10-CM

## 2022-05-04 DIAGNOSIS — M79.674 PAIN DUE TO ONYCHOMYCOSIS OF TOENAIL OF RIGHT FOOT: Primary | ICD-10-CM

## 2022-05-04 DIAGNOSIS — B35.1 PAIN DUE TO ONYCHOMYCOSIS OF TOENAIL OF LEFT FOOT: ICD-10-CM

## 2022-05-04 PROCEDURE — 11721 DEBRIDE NAIL 6 OR MORE: CPT | Performed by: PODIATRIST

## 2022-05-19 ENCOUNTER — OFFICE VISIT (OUTPATIENT)
Dept: DERMATOLOGY CLINIC | Facility: CLINIC | Age: 77
End: 2022-05-19
Payer: MEDICARE

## 2022-05-19 DIAGNOSIS — L82.1 SEBORRHEIC KERATOSES: ICD-10-CM

## 2022-05-19 DIAGNOSIS — D23.4 BENIGN NEOPLASM OF SCALP AND SKIN OF NECK: ICD-10-CM

## 2022-05-19 DIAGNOSIS — Z86.018 HISTORY OF DYSPLASTIC NEVUS: ICD-10-CM

## 2022-05-19 DIAGNOSIS — D23.9 BENIGN NEOPLASM OF SKIN, UNSPECIFIED LOCATION: ICD-10-CM

## 2022-05-19 DIAGNOSIS — D23.60 BENIGN NEOPLASM OF SKIN OF UPPER LIMB, INCLUDING SHOULDER, UNSPECIFIED LATERALITY: ICD-10-CM

## 2022-05-19 DIAGNOSIS — C69.90 OCULAR MELANOMA, UNSPECIFIED LATERALITY (HCC): Primary | ICD-10-CM

## 2022-05-19 DIAGNOSIS — D23.70 BENIGN NEOPLASM OF SKIN OF LOWER LIMB, INCLUDING HIP, UNSPECIFIED LATERALITY: ICD-10-CM

## 2022-05-19 DIAGNOSIS — D22.9 MULTIPLE NEVI: ICD-10-CM

## 2022-05-19 DIAGNOSIS — D23.5 BENIGN NEOPLASM OF SKIN OF TRUNK, EXCEPT SCROTUM: ICD-10-CM

## 2022-05-19 DIAGNOSIS — L57.0 AK (ACTINIC KERATOSIS): ICD-10-CM

## 2022-05-19 DIAGNOSIS — D23.30 BENIGN NEOPLASM OF SKIN OF FACE: ICD-10-CM

## 2022-05-19 PROCEDURE — 99213 OFFICE O/P EST LOW 20 MIN: CPT | Performed by: DERMATOLOGY

## 2022-05-24 ENCOUNTER — TELEPHONE (OUTPATIENT)
Dept: NEPHROLOGY | Facility: CLINIC | Age: 77
End: 2022-05-24

## 2022-05-24 ENCOUNTER — LAB ENCOUNTER (OUTPATIENT)
Dept: LAB | Facility: HOSPITAL | Age: 77
End: 2022-05-24
Attending: INTERNAL MEDICINE
Payer: MEDICARE

## 2022-05-24 DIAGNOSIS — I10 ESSENTIAL HYPERTENSION: ICD-10-CM

## 2022-05-24 DIAGNOSIS — N18.32 STAGE 3B CHRONIC KIDNEY DISEASE (HCC): ICD-10-CM

## 2022-05-24 LAB
ALBUMIN SERPL-MCNC: 3.8 G/DL (ref 3.4–5)
ANION GAP SERPL CALC-SCNC: 4 MMOL/L (ref 0–18)
BASOPHILS # BLD AUTO: 0.04 X10(3) UL (ref 0–0.2)
BASOPHILS NFR BLD AUTO: 0.9 %
BUN BLD-MCNC: 17 MG/DL (ref 7–18)
BUN/CREAT SERPL: 13.1 (ref 10–20)
CALCIUM BLD-MCNC: 9.8 MG/DL (ref 8.5–10.1)
CHLORIDE SERPL-SCNC: 107 MMOL/L (ref 98–112)
CO2 SERPL-SCNC: 31 MMOL/L (ref 21–32)
CREAT BLD-MCNC: 1.3 MG/DL
DEPRECATED RDW RBC AUTO: 42.9 FL (ref 35.1–46.3)
EOSINOPHIL # BLD AUTO: 0.21 X10(3) UL (ref 0–0.7)
EOSINOPHIL NFR BLD AUTO: 4.7 %
ERYTHROCYTE [DISTWIDTH] IN BLOOD BY AUTOMATED COUNT: 12.3 % (ref 11–15)
GLUCOSE BLD-MCNC: 115 MG/DL (ref 70–99)
HCT VFR BLD AUTO: 42.8 %
HGB BLD-MCNC: 13.1 G/DL
IMM GRANULOCYTES # BLD AUTO: 0.01 X10(3) UL (ref 0–1)
IMM GRANULOCYTES NFR BLD: 0.2 %
LYMPHOCYTES # BLD AUTO: 1.29 X10(3) UL (ref 1–4)
LYMPHOCYTES NFR BLD AUTO: 28.7 %
MCH RBC QN AUTO: 29.2 PG (ref 26–34)
MCHC RBC AUTO-ENTMCNC: 30.6 G/DL (ref 31–37)
MCV RBC AUTO: 95.3 FL
MONOCYTES # BLD AUTO: 0.32 X10(3) UL (ref 0.1–1)
MONOCYTES NFR BLD AUTO: 7.1 %
NEUTROPHILS # BLD AUTO: 2.62 X10 (3) UL (ref 1.5–7.7)
NEUTROPHILS # BLD AUTO: 2.62 X10(3) UL (ref 1.5–7.7)
NEUTROPHILS NFR BLD AUTO: 58.4 %
OSMOLALITY SERPL CALC.SUM OF ELEC: 296 MOSM/KG (ref 275–295)
PHOSPHATE SERPL-MCNC: 3.8 MG/DL (ref 2.5–4.9)
PLATELET # BLD AUTO: 356 10(3)UL (ref 150–450)
POTASSIUM SERPL-SCNC: 4.8 MMOL/L (ref 3.5–5.1)
RBC # BLD AUTO: 4.49 X10(6)UL
SODIUM SERPL-SCNC: 142 MMOL/L (ref 136–145)
WBC # BLD AUTO: 4.5 X10(3) UL (ref 4–11)

## 2022-05-24 PROCEDURE — 80069 RENAL FUNCTION PANEL: CPT

## 2022-05-24 PROCEDURE — 36415 COLL VENOUS BLD VENIPUNCTURE: CPT

## 2022-05-24 PROCEDURE — 85025 COMPLETE CBC W/AUTO DIFF WBC: CPT

## 2022-05-24 NOTE — TELEPHONE ENCOUNTER
Kidneys remain stable.   If doing well just repeat her standing order just before upcoming visit in August

## 2022-06-07 ENCOUNTER — TELEPHONE (OUTPATIENT)
Dept: OPHTHALMOLOGY | Facility: CLINIC | Age: 77
End: 2022-06-07

## 2022-06-07 NOTE — TELEPHONE ENCOUNTER
Patient's appointment was moved from 10/12/22 to 6/23/22. Patient was grateful that I moved her appointment up.

## 2022-06-23 ENCOUNTER — OFFICE VISIT (OUTPATIENT)
Dept: OPHTHALMOLOGY | Facility: CLINIC | Age: 77
End: 2022-06-23
Payer: MEDICARE

## 2022-06-23 DIAGNOSIS — H25.13 AGE-RELATED NUCLEAR CATARACT OF BOTH EYES: Primary | ICD-10-CM

## 2022-06-23 DIAGNOSIS — C69.91 OCULAR MELANOMA, RIGHT (HCC): ICD-10-CM

## 2022-06-23 DIAGNOSIS — H43.392 FLOATER, VITREOUS, LEFT: ICD-10-CM

## 2022-06-23 PROCEDURE — 92015 DETERMINE REFRACTIVE STATE: CPT | Performed by: OPHTHALMOLOGY

## 2022-06-23 PROCEDURE — 1126F AMNT PAIN NOTED NONE PRSNT: CPT | Performed by: OPHTHALMOLOGY

## 2022-06-23 PROCEDURE — 92014 COMPRE OPH EXAM EST PT 1/>: CPT | Performed by: OPHTHALMOLOGY

## 2022-06-23 NOTE — ASSESSMENT & PLAN NOTE
Discussed with patient that there is a significant cataract in the right eye, but due to poor vision secondary to melanoma will not consider cataract surgery unless Dr. Bouchra Marquez recommends it. Patient saw Dr. Bouchra Marquez on 12/2/21 and he did not recommend any treatment on either cataract at that time. He recommended observation. Will follow. Patient says she is not bothered by vision in the left eye; therefore she is not interested in cataract surgery in the left eye. Patient says she is \"blessed to still be able to read. \"  New glasses today; update as needed. Discussed that new prescription is only a slight change.

## 2022-06-23 NOTE — PATIENT INSTRUCTIONS
Ocular melanoma, right ([facility] [address]  Diagnosed in 2010 and treated by Dr. Bridgette Urias at [facility]. Continue to follow up yearly. Pt last saw Dr. Bridgette Urias on 12/2/21 and will  follow up with him in a year. She will see an ophthalmologist twice a year; Dr. Bridgette Urias in December and Dr. Lee Hollins in June. Age-related nuclear cataract of both eyes  Discussed with patient that there is a significant cataract in the right eye, but due to poor vision secondary to melanoma will not consider cataract surgery unless Dr. Bridgette Urias recommends it. Patient saw Dr. Bridgette Urias on 12/2/21 and he did not recommend any treatment on either cataract at that time. He recommended observation. Will follow. Patient says she is not bothered by vision in the left eye; therefore she is not interested in cataract surgery in the left eye. Patient says she is \"blessed to still be able to read. \"  New glasses today; update as needed. Discussed that new prescription is only a slight change. Floater, vitreous, left  No treatment.

## 2022-06-23 NOTE — ASSESSMENT & PLAN NOTE
Diagnosed in 2010 and treated by Dr. Amanda Landaverde at [facility]. Continue to follow up yearly. Pt last saw Dr. Amanda Landaverde on 12/2/21 and will  follow up with him in a year. She will see an ophthalmologist twice a year; Dr. Amanda Landaverde in December and Dr. Billy Rajan in June.

## 2022-07-07 ENCOUNTER — OFFICE VISIT (OUTPATIENT)
Dept: FAMILY MEDICINE CLINIC | Facility: CLINIC | Age: 77
End: 2022-07-07
Payer: MEDICARE

## 2022-07-07 VITALS
DIASTOLIC BLOOD PRESSURE: 74 MMHG | WEIGHT: 180 LBS | HEART RATE: 63 BPM | BODY MASS INDEX: 31.89 KG/M2 | HEIGHT: 63 IN | SYSTOLIC BLOOD PRESSURE: 121 MMHG

## 2022-07-07 DIAGNOSIS — E78.5 HYPERLIPIDEMIA, UNSPECIFIED HYPERLIPIDEMIA TYPE: ICD-10-CM

## 2022-07-07 DIAGNOSIS — Z98.890 H/O CAROTID ENDARTERECTOMY: Primary | ICD-10-CM

## 2022-07-07 DIAGNOSIS — I65.22 CAROTID ATHEROSCLEROSIS, LEFT: ICD-10-CM

## 2022-07-07 DIAGNOSIS — I10 ESSENTIAL HYPERTENSION: ICD-10-CM

## 2022-07-07 DIAGNOSIS — N18.31 STAGE 3A CHRONIC KIDNEY DISEASE (HCC): ICD-10-CM

## 2022-07-07 DIAGNOSIS — F32.A MILD DEPRESSION: ICD-10-CM

## 2022-07-07 PROCEDURE — 99204 OFFICE O/P NEW MOD 45 MIN: CPT | Performed by: STUDENT IN AN ORGANIZED HEALTH CARE EDUCATION/TRAINING PROGRAM

## 2022-07-07 RX ORDER — TRIAMTERENE AND HYDROCHLOROTHIAZIDE 37.5; 25 MG/1; MG/1
CAPSULE ORAL
Qty: 30 CAPSULE | Refills: 1 | Status: SHIPPED | OUTPATIENT
Start: 2022-07-07

## 2022-07-07 RX ORDER — ASPIRIN 325 MG
162.5 TABLET ORAL DAILY
Qty: 90 TABLET | Refills: 1 | Status: SHIPPED | OUTPATIENT
Start: 2022-07-07

## 2022-07-07 RX ORDER — CITALOPRAM 10 MG/1
10 TABLET ORAL
Qty: 90 TABLET | Refills: 1 | Status: SHIPPED | OUTPATIENT
Start: 2022-07-07

## 2022-07-07 RX ORDER — METOPROLOL SUCCINATE 25 MG/1
25 TABLET, EXTENDED RELEASE ORAL DAILY
Qty: 90 TABLET | Refills: 2 | Status: SHIPPED | OUTPATIENT
Start: 2022-07-07

## 2022-07-07 RX ORDER — AMLODIPINE BESYLATE 2.5 MG/1
2.5 TABLET ORAL DAILY
Qty: 90 TABLET | Refills: 1 | Status: SHIPPED | OUTPATIENT
Start: 2022-07-07 | End: 2023-07-02

## 2022-07-07 RX ORDER — PRAVASTATIN SODIUM 40 MG
40 TABLET ORAL NIGHTLY
Qty: 90 TABLET | Refills: 1 | Status: SHIPPED | OUTPATIENT
Start: 2022-07-07

## 2022-07-19 ENCOUNTER — LAB ENCOUNTER (OUTPATIENT)
Dept: LAB | Facility: HOSPITAL | Age: 77
End: 2022-07-19
Attending: STUDENT IN AN ORGANIZED HEALTH CARE EDUCATION/TRAINING PROGRAM
Payer: MEDICARE

## 2022-07-19 ENCOUNTER — OFFICE VISIT (OUTPATIENT)
Dept: OTOLARYNGOLOGY | Facility: CLINIC | Age: 77
End: 2022-07-19
Payer: MEDICARE

## 2022-07-19 VITALS — BODY MASS INDEX: 31.89 KG/M2 | TEMPERATURE: 97 F | WEIGHT: 180 LBS | HEIGHT: 63 IN

## 2022-07-19 DIAGNOSIS — H61.23 BILATERAL IMPACTED CERUMEN: Primary | ICD-10-CM

## 2022-07-19 DIAGNOSIS — I10 ESSENTIAL HYPERTENSION: ICD-10-CM

## 2022-07-19 DIAGNOSIS — E78.5 HYPERLIPIDEMIA, UNSPECIFIED HYPERLIPIDEMIA TYPE: ICD-10-CM

## 2022-07-19 LAB
ALBUMIN SERPL-MCNC: 3.7 G/DL (ref 3.4–5)
ALBUMIN/GLOB SERPL: 0.9 {RATIO} (ref 1–2)
ALP LIVER SERPL-CCNC: 80 U/L
ALT SERPL-CCNC: 23 U/L
ANION GAP SERPL CALC-SCNC: 8 MMOL/L (ref 0–18)
AST SERPL-CCNC: 30 U/L (ref 15–37)
BILIRUB SERPL-MCNC: 0.5 MG/DL (ref 0.1–2)
BILIRUB UR QL: NEGATIVE
BUN BLD-MCNC: 21 MG/DL (ref 7–18)
BUN/CREAT SERPL: 15.7 (ref 10–20)
CALCIUM BLD-MCNC: 9.4 MG/DL (ref 8.5–10.1)
CHLORIDE SERPL-SCNC: 106 MMOL/L (ref 98–112)
CHOLEST SERPL-MCNC: 198 MG/DL (ref ?–200)
CLARITY UR: CLEAR
CO2 SERPL-SCNC: 28 MMOL/L (ref 21–32)
COLOR UR: YELLOW
CREAT BLD-MCNC: 1.34 MG/DL
DEPRECATED RDW RBC AUTO: 42.2 FL (ref 35.1–46.3)
ERYTHROCYTE [DISTWIDTH] IN BLOOD BY AUTOMATED COUNT: 12.2 % (ref 11–15)
FASTING PATIENT LIPID ANSWER: YES
FASTING STATUS PATIENT QL REPORTED: YES
GLOBULIN PLAS-MCNC: 4 G/DL (ref 2.8–4.4)
GLUCOSE BLD-MCNC: 88 MG/DL (ref 70–99)
GLUCOSE UR-MCNC: NEGATIVE MG/DL
HCT VFR BLD AUTO: 41.1 %
HDLC SERPL-MCNC: 54 MG/DL (ref 40–59)
HGB BLD-MCNC: 12.5 G/DL
HGB UR QL STRIP.AUTO: NEGATIVE
KETONES UR-MCNC: NEGATIVE MG/DL
LDLC SERPL CALC-MCNC: 119 MG/DL (ref ?–100)
LEUKOCYTE ESTERASE UR QL STRIP.AUTO: NEGATIVE
MCH RBC QN AUTO: 29.1 PG (ref 26–34)
MCHC RBC AUTO-ENTMCNC: 30.4 G/DL (ref 31–37)
MCV RBC AUTO: 95.8 FL
NITRITE UR QL STRIP.AUTO: NEGATIVE
NONHDLC SERPL-MCNC: 144 MG/DL (ref ?–130)
OSMOLALITY SERPL CALC.SUM OF ELEC: 296 MOSM/KG (ref 275–295)
PH UR: 7 [PH] (ref 5–8)
PLATELET # BLD AUTO: 325 10(3)UL (ref 150–450)
POTASSIUM SERPL-SCNC: 3.9 MMOL/L (ref 3.5–5.1)
PROT SERPL-MCNC: 7.7 G/DL (ref 6.4–8.2)
PROT UR-MCNC: NEGATIVE MG/DL
RBC # BLD AUTO: 4.29 X10(6)UL
SODIUM SERPL-SCNC: 142 MMOL/L (ref 136–145)
SP GR UR STRIP: 1.01 (ref 1–1.03)
TRIGL SERPL-MCNC: 142 MG/DL (ref 30–149)
TSI SER-ACNC: 1.41 MIU/ML (ref 0.36–3.74)
UROBILINOGEN UR STRIP-ACNC: 0.2
VLDLC SERPL CALC-MCNC: 25 MG/DL (ref 0–30)
WBC # BLD AUTO: 4.4 X10(3) UL (ref 4–11)

## 2022-07-19 PROCEDURE — 81003 URINALYSIS AUTO W/O SCOPE: CPT

## 2022-07-19 PROCEDURE — 69210 REMOVE IMPACTED EAR WAX UNI: CPT | Performed by: OTOLARYNGOLOGY

## 2022-07-19 PROCEDURE — 36415 COLL VENOUS BLD VENIPUNCTURE: CPT

## 2022-07-19 PROCEDURE — 84443 ASSAY THYROID STIM HORMONE: CPT

## 2022-07-19 PROCEDURE — 85027 COMPLETE CBC AUTOMATED: CPT

## 2022-07-19 PROCEDURE — 80061 LIPID PANEL: CPT

## 2022-07-19 PROCEDURE — 80053 COMPREHEN METABOLIC PANEL: CPT

## 2022-08-15 ENCOUNTER — TELEPHONE (OUTPATIENT)
Dept: NEPHROLOGY | Facility: CLINIC | Age: 77
End: 2022-08-15

## 2022-08-15 NOTE — TELEPHONE ENCOUNTER
Patient wants to know if she is due for labs and what labs is she suppose to have? Please call. Thank you.

## 2022-08-15 NOTE — TELEPHONE ENCOUNTER
Informed patient is due for labs last  done on 5/24/22 -last visit notes-  She will continue to do CBC and renal panels quarterly. I will see her again in 6 months for follow-up or sooner if clinically indicated. Patient verbalized understanding.

## 2022-08-23 ENCOUNTER — LAB ENCOUNTER (OUTPATIENT)
Dept: LAB | Facility: HOSPITAL | Age: 77
End: 2022-08-23
Attending: INTERNAL MEDICINE
Payer: MEDICARE

## 2022-08-23 DIAGNOSIS — N18.32 STAGE 3B CHRONIC KIDNEY DISEASE (HCC): ICD-10-CM

## 2022-08-23 LAB
ALBUMIN SERPL-MCNC: 3.7 G/DL (ref 3.4–5)
ANION GAP SERPL CALC-SCNC: 5 MMOL/L (ref 0–18)
BASOPHILS # BLD AUTO: 0.06 X10(3) UL (ref 0–0.2)
BASOPHILS NFR BLD AUTO: 1.4 %
BUN BLD-MCNC: 23 MG/DL (ref 7–18)
BUN/CREAT SERPL: 17.4 (ref 10–20)
CALCIUM BLD-MCNC: 9.1 MG/DL (ref 8.5–10.1)
CHLORIDE SERPL-SCNC: 111 MMOL/L (ref 98–112)
CO2 SERPL-SCNC: 26 MMOL/L (ref 21–32)
CREAT BLD-MCNC: 1.32 MG/DL
DEPRECATED RDW RBC AUTO: 42.5 FL (ref 35.1–46.3)
EOSINOPHIL # BLD AUTO: 0.19 X10(3) UL (ref 0–0.7)
EOSINOPHIL NFR BLD AUTO: 4.4 %
ERYTHROCYTE [DISTWIDTH] IN BLOOD BY AUTOMATED COUNT: 12.3 % (ref 11–15)
GFR SERPLBLD BASED ON 1.73 SQ M-ARVRAT: 41 ML/MIN/1.73M2 (ref 60–?)
GLUCOSE BLD-MCNC: 101 MG/DL (ref 70–99)
HCT VFR BLD AUTO: 40.2 %
HGB BLD-MCNC: 12.6 G/DL
IMM GRANULOCYTES # BLD AUTO: 0.01 X10(3) UL (ref 0–1)
IMM GRANULOCYTES NFR BLD: 0.2 %
LYMPHOCYTES # BLD AUTO: 1.15 X10(3) UL (ref 1–4)
LYMPHOCYTES NFR BLD AUTO: 26.6 %
MCH RBC QN AUTO: 29.5 PG (ref 26–34)
MCHC RBC AUTO-ENTMCNC: 31.3 G/DL (ref 31–37)
MCV RBC AUTO: 94.1 FL
MONOCYTES # BLD AUTO: 0.38 X10(3) UL (ref 0.1–1)
MONOCYTES NFR BLD AUTO: 8.8 %
NEUTROPHILS # BLD AUTO: 2.54 X10 (3) UL (ref 1.5–7.7)
NEUTROPHILS # BLD AUTO: 2.54 X10(3) UL (ref 1.5–7.7)
NEUTROPHILS NFR BLD AUTO: 58.6 %
OSMOLALITY SERPL CALC.SUM OF ELEC: 298 MOSM/KG (ref 275–295)
PHOSPHATE SERPL-MCNC: 3.2 MG/DL (ref 2.5–4.9)
PLATELET # BLD AUTO: 346 10(3)UL (ref 150–450)
POTASSIUM SERPL-SCNC: 4.1 MMOL/L (ref 3.5–5.1)
RBC # BLD AUTO: 4.27 X10(6)UL
SODIUM SERPL-SCNC: 142 MMOL/L (ref 136–145)
WBC # BLD AUTO: 4.3 X10(3) UL (ref 4–11)

## 2022-08-23 PROCEDURE — 36415 COLL VENOUS BLD VENIPUNCTURE: CPT

## 2022-08-23 PROCEDURE — 85025 COMPLETE CBC W/AUTO DIFF WBC: CPT

## 2022-08-23 PROCEDURE — 80069 RENAL FUNCTION PANEL: CPT

## 2022-08-24 ENCOUNTER — OFFICE VISIT (OUTPATIENT)
Dept: PODIATRY CLINIC | Facility: CLINIC | Age: 77
End: 2022-08-24
Payer: MEDICARE

## 2022-08-24 DIAGNOSIS — M79.675 PAIN DUE TO ONYCHOMYCOSIS OF TOENAIL OF LEFT FOOT: ICD-10-CM

## 2022-08-24 DIAGNOSIS — M79.674 PAIN DUE TO ONYCHOMYCOSIS OF TOENAIL OF RIGHT FOOT: Primary | ICD-10-CM

## 2022-08-24 DIAGNOSIS — B35.1 PAIN DUE TO ONYCHOMYCOSIS OF TOENAIL OF RIGHT FOOT: Primary | ICD-10-CM

## 2022-08-24 DIAGNOSIS — B35.1 PAIN DUE TO ONYCHOMYCOSIS OF TOENAIL OF LEFT FOOT: ICD-10-CM

## 2022-08-24 PROCEDURE — 11721 DEBRIDE NAIL 6 OR MORE: CPT | Performed by: PODIATRIST

## 2022-08-25 ENCOUNTER — TELEPHONE (OUTPATIENT)
Dept: NEPHROLOGY | Facility: CLINIC | Age: 77
End: 2022-08-25

## 2022-08-29 ENCOUNTER — OFFICE VISIT (OUTPATIENT)
Dept: NEPHROLOGY | Facility: CLINIC | Age: 77
End: 2022-08-29
Payer: MEDICARE

## 2022-08-29 ENCOUNTER — HOSPITAL ENCOUNTER (OUTPATIENT)
Dept: ULTRASOUND IMAGING | Facility: HOSPITAL | Age: 77
Discharge: HOME | End: 2022-08-29
Attending: STUDENT IN AN ORGANIZED HEALTH CARE EDUCATION/TRAINING PROGRAM
Payer: MEDICARE

## 2022-08-29 VITALS
SYSTOLIC BLOOD PRESSURE: 125 MMHG | HEART RATE: 57 BPM | WEIGHT: 184.19 LBS | BODY MASS INDEX: 33 KG/M2 | DIASTOLIC BLOOD PRESSURE: 71 MMHG

## 2022-08-29 DIAGNOSIS — N18.32 STAGE 3B CHRONIC KIDNEY DISEASE (HCC): Primary | ICD-10-CM

## 2022-08-29 DIAGNOSIS — Z98.890 H/O CAROTID ENDARTERECTOMY: ICD-10-CM

## 2022-08-29 DIAGNOSIS — E78.5 HYPERLIPIDEMIA, UNSPECIFIED HYPERLIPIDEMIA TYPE: ICD-10-CM

## 2022-08-29 DIAGNOSIS — I65.22 CAROTID ATHEROSCLEROSIS, LEFT: ICD-10-CM

## 2022-08-29 PROCEDURE — 99214 OFFICE O/P EST MOD 30 MIN: CPT | Performed by: INTERNAL MEDICINE

## 2022-08-29 PROCEDURE — 93880 EXTRACRANIAL BILAT STUDY: CPT | Performed by: STUDENT IN AN ORGANIZED HEALTH CARE EDUCATION/TRAINING PROGRAM

## 2022-08-29 NOTE — PROGRESS NOTES
08/29/22        Patient: Giacomo Maldonado   YOB: 1945   Date of Visit: 8/29/2022       Dear  Dr. Kaleigh Gomez MD,      Thank you for referring Giacomo Maldonado to my practice. Please find my assessment and plan below. As you know she is a 76-year-old female with a history of hypertension, hypercholesterolemia, melanoma involving her right eye, depression who I now had the pleasure of seeing for follow-up. Overall the patient states she is doing well without any chest pain, shortness of breath, GI or urinary tract symptoms. Just had carotid ultrasound done earlier today. She does check her blood pressures on a regular basis and they are under very good control with systolics in the 1 teens to 130 range and diastolics usually in the 54Z. On physical exam her blood pressure is 125/71 with a pulse of 57 she weighed 184 pounds. Her neck was supple without JVD. Lungs were clear. Heart revealed a regular rate and rhythm with an S4 but no gallops, murmurs or rubs. Abdomen was soft, flat, nontender without organomegaly, masses or bruits. Extremities revealed no edema. I reviewed her most recent labs done in August 23, 2022. Creatinine remains stable at 1.32 with an estimated GFR of 41 cc/min. Electrolytes and hemoglobin were normal.    I therefore reassured the patient that overall her renal function is stable. Blood pressures are under good control. She knows to maintain adequate hydration. Avoid nonsteroidals. She will continue to do CBC and renal panels quarterly. I will see her again in 6 months for follow-up or sooner if clinically indicated. Thank you again for allowing me to participate in the care of your patient. If you have any questions please feel free to call.            Sincerely,   Mariama Dickens MD   [address]  [address]  [place] [phone]    Document electronically generated by:  Mariama Dickens MD

## 2022-09-22 ENCOUNTER — OFFICE VISIT (OUTPATIENT)
Dept: DERMATOLOGY CLINIC | Facility: CLINIC | Age: 77
End: 2022-09-22
Payer: MEDICARE

## 2022-09-22 DIAGNOSIS — L57.0 AK (ACTINIC KERATOSIS): ICD-10-CM

## 2022-09-22 DIAGNOSIS — D23.9 BENIGN NEOPLASM OF SKIN, UNSPECIFIED LOCATION: ICD-10-CM

## 2022-09-22 DIAGNOSIS — Z86.018 HISTORY OF DYSPLASTIC NEVUS: ICD-10-CM

## 2022-09-22 DIAGNOSIS — L82.1 SEBORRHEIC KERATOSES: ICD-10-CM

## 2022-09-22 DIAGNOSIS — C69.90 OCULAR MELANOMA, UNSPECIFIED LATERALITY (HCC): ICD-10-CM

## 2022-09-22 DIAGNOSIS — D22.9 MULTIPLE NEVI: Primary | ICD-10-CM

## 2022-09-22 PROCEDURE — 1126F AMNT PAIN NOTED NONE PRSNT: CPT | Performed by: DERMATOLOGY

## 2022-09-22 PROCEDURE — 99213 OFFICE O/P EST LOW 20 MIN: CPT | Performed by: DERMATOLOGY

## 2022-11-07 ENCOUNTER — OFFICE VISIT (OUTPATIENT)
Dept: FAMILY MEDICINE CLINIC | Facility: CLINIC | Age: 77
End: 2022-11-07
Payer: MEDICARE

## 2022-11-07 ENCOUNTER — LAB ENCOUNTER (OUTPATIENT)
Dept: LAB | Age: 77
End: 2022-11-07
Attending: STUDENT IN AN ORGANIZED HEALTH CARE EDUCATION/TRAINING PROGRAM
Payer: MEDICARE

## 2022-11-07 VITALS
HEIGHT: 63 IN | HEART RATE: 72 BPM | SYSTOLIC BLOOD PRESSURE: 127 MMHG | DIASTOLIC BLOOD PRESSURE: 76 MMHG | WEIGHT: 185 LBS | BODY MASS INDEX: 32.78 KG/M2

## 2022-11-07 DIAGNOSIS — E55.9 VITAMIN D DEFICIENCY: ICD-10-CM

## 2022-11-07 DIAGNOSIS — F32.A MILD DEPRESSION: ICD-10-CM

## 2022-11-07 DIAGNOSIS — I65.22 CAROTID ATHEROSCLEROSIS, LEFT: ICD-10-CM

## 2022-11-07 DIAGNOSIS — Z78.0 POSTMENOPAUSAL: ICD-10-CM

## 2022-11-07 DIAGNOSIS — I10 ESSENTIAL HYPERTENSION: ICD-10-CM

## 2022-11-07 DIAGNOSIS — Z13.6 SCREENING FOR CARDIOVASCULAR CONDITION: ICD-10-CM

## 2022-11-07 DIAGNOSIS — C69.91 OCULAR MELANOMA, RIGHT (HCC): ICD-10-CM

## 2022-11-07 DIAGNOSIS — Z12.31 SCREENING MAMMOGRAM FOR BREAST CANCER: ICD-10-CM

## 2022-11-07 DIAGNOSIS — N18.31 STAGE 3A CHRONIC KIDNEY DISEASE (HCC): ICD-10-CM

## 2022-11-07 DIAGNOSIS — H25.13 AGE-RELATED NUCLEAR CATARACT OF BOTH EYES: ICD-10-CM

## 2022-11-07 DIAGNOSIS — H43.392 FLOATER, VITREOUS, LEFT: ICD-10-CM

## 2022-11-07 DIAGNOSIS — Z00.00 ENCOUNTER FOR ANNUAL HEALTH EXAMINATION: Primary | ICD-10-CM

## 2022-11-07 DIAGNOSIS — E78.5 HYPERLIPIDEMIA, UNSPECIFIED HYPERLIPIDEMIA TYPE: ICD-10-CM

## 2022-11-07 DIAGNOSIS — Z98.890 H/O CAROTID ENDARTERECTOMY: ICD-10-CM

## 2022-11-07 LAB
ALBUMIN SERPL-MCNC: 4 G/DL (ref 3.4–5)
ALBUMIN/GLOB SERPL: 1.1 {RATIO} (ref 1–2)
ALP LIVER SERPL-CCNC: 80 U/L
ALT SERPL-CCNC: 23 U/L
ANION GAP SERPL CALC-SCNC: 7 MMOL/L (ref 0–18)
AST SERPL-CCNC: 25 U/L (ref 15–37)
BASOPHILS # BLD AUTO: 0.07 X10(3) UL (ref 0–0.2)
BASOPHILS NFR BLD AUTO: 1.5 %
BILIRUB SERPL-MCNC: 0.5 MG/DL (ref 0.1–2)
BILIRUB UR QL: NEGATIVE
BUN BLD-MCNC: 28 MG/DL (ref 7–18)
BUN/CREAT SERPL: 21.4 (ref 10–20)
CALCIUM BLD-MCNC: 9.6 MG/DL (ref 8.5–10.1)
CHLORIDE SERPL-SCNC: 108 MMOL/L (ref 98–112)
CHOLEST SERPL-MCNC: 180 MG/DL (ref ?–200)
CLARITY UR: CLEAR
CO2 SERPL-SCNC: 25 MMOL/L (ref 21–32)
COLOR UR: YELLOW
CREAT BLD-MCNC: 1.31 MG/DL
DEPRECATED RDW RBC AUTO: 42.7 FL (ref 35.1–46.3)
EOSINOPHIL # BLD AUTO: 0.21 X10(3) UL (ref 0–0.7)
EOSINOPHIL NFR BLD AUTO: 4.5 %
ERYTHROCYTE [DISTWIDTH] IN BLOOD BY AUTOMATED COUNT: 12.4 % (ref 11–15)
FASTING PATIENT LIPID ANSWER: YES
FASTING STATUS PATIENT QL REPORTED: YES
GFR SERPLBLD BASED ON 1.73 SQ M-ARVRAT: 42 ML/MIN/1.73M2 (ref 60–?)
GLOBULIN PLAS-MCNC: 3.8 G/DL (ref 2.8–4.4)
GLUCOSE BLD-MCNC: 89 MG/DL (ref 70–99)
GLUCOSE UR-MCNC: NEGATIVE MG/DL
HCT VFR BLD AUTO: 39.8 %
HDLC SERPL-MCNC: 58 MG/DL (ref 40–59)
HGB BLD-MCNC: 12.3 G/DL
HGB UR QL STRIP.AUTO: NEGATIVE
IMM GRANULOCYTES # BLD AUTO: 0.02 X10(3) UL (ref 0–1)
IMM GRANULOCYTES NFR BLD: 0.4 %
KETONES UR-MCNC: NEGATIVE MG/DL
LDLC SERPL CALC-MCNC: 100 MG/DL (ref ?–100)
LEUKOCYTE ESTERASE UR QL STRIP.AUTO: NEGATIVE
LYMPHOCYTES # BLD AUTO: 1.3 X10(3) UL (ref 1–4)
LYMPHOCYTES NFR BLD AUTO: 27.8 %
MCH RBC QN AUTO: 29.4 PG (ref 26–34)
MCHC RBC AUTO-ENTMCNC: 30.9 G/DL (ref 31–37)
MCV RBC AUTO: 95 FL
MONOCYTES # BLD AUTO: 0.38 X10(3) UL (ref 0.1–1)
MONOCYTES NFR BLD AUTO: 8.1 %
NEUTROPHILS # BLD AUTO: 2.7 X10 (3) UL (ref 1.5–7.7)
NEUTROPHILS # BLD AUTO: 2.7 X10(3) UL (ref 1.5–7.7)
NEUTROPHILS NFR BLD AUTO: 57.7 %
NITRITE UR QL STRIP.AUTO: NEGATIVE
NONHDLC SERPL-MCNC: 122 MG/DL (ref ?–130)
OSMOLALITY SERPL CALC.SUM OF ELEC: 295 MOSM/KG (ref 275–295)
PH UR: 7 [PH] (ref 5–8)
PLATELET # BLD AUTO: 325 10(3)UL (ref 150–450)
POTASSIUM SERPL-SCNC: 4 MMOL/L (ref 3.5–5.1)
PROT SERPL-MCNC: 7.8 G/DL (ref 6.4–8.2)
PROT UR-MCNC: NEGATIVE MG/DL
RBC # BLD AUTO: 4.19 X10(6)UL
SODIUM SERPL-SCNC: 140 MMOL/L (ref 136–145)
SP GR UR STRIP: 1.01 (ref 1–1.03)
TRIGL SERPL-MCNC: 123 MG/DL (ref 30–149)
TSI SER-ACNC: 0.83 MIU/ML (ref 0.36–3.74)
UROBILINOGEN UR STRIP-ACNC: <2
VIT C UR-MCNC: NEGATIVE MG/DL
VIT D+METAB SERPL-MCNC: 42.8 NG/ML (ref 30–100)
VLDLC SERPL CALC-MCNC: 20 MG/DL (ref 0–30)
WBC # BLD AUTO: 4.7 X10(3) UL (ref 4–11)

## 2022-11-07 PROCEDURE — 80053 COMPREHEN METABOLIC PANEL: CPT

## 2022-11-07 PROCEDURE — 36415 COLL VENOUS BLD VENIPUNCTURE: CPT

## 2022-11-07 PROCEDURE — 81003 URINALYSIS AUTO W/O SCOPE: CPT

## 2022-11-07 PROCEDURE — 80061 LIPID PANEL: CPT

## 2022-11-07 PROCEDURE — 82306 VITAMIN D 25 HYDROXY: CPT

## 2022-11-07 PROCEDURE — 84443 ASSAY THYROID STIM HORMONE: CPT

## 2022-11-07 PROCEDURE — 85025 COMPLETE CBC W/AUTO DIFF WBC: CPT

## 2022-11-09 NOTE — ASSESSMENT & PLAN NOTE
----- Message from Sue Brandon MA sent at 11/9/2022  9:36 AM CST -----  Regarding: pt advice  Contact: mother  Mom has questions about Orapred that she would like to speak to a nurse about.   Ph [phone]     Stage III chronic kidney disease most likely related to hypertension. Blood pressures are stable at this time. Advised to drink plenty of fluids and recheck labs prior to the next office visit.   Avoid Advil, Aleve, ibuprofen like medications

## 2022-11-15 ENCOUNTER — HOSPITAL ENCOUNTER (OUTPATIENT)
Dept: MAMMOGRAPHY | Facility: HOSPITAL | Age: 77
Discharge: HOME | End: 2022-11-15
Attending: STUDENT IN AN ORGANIZED HEALTH CARE EDUCATION/TRAINING PROGRAM
Payer: MEDICARE

## 2022-11-15 DIAGNOSIS — Z12.31 SCREENING MAMMOGRAM FOR BREAST CANCER: ICD-10-CM

## 2022-11-15 PROCEDURE — 77063 BREAST TOMOSYNTHESIS BI: CPT | Performed by: STUDENT IN AN ORGANIZED HEALTH CARE EDUCATION/TRAINING PROGRAM

## 2022-11-15 PROCEDURE — 77067 SCR MAMMO BI INCL CAD: CPT | Performed by: STUDENT IN AN ORGANIZED HEALTH CARE EDUCATION/TRAINING PROGRAM

## 2022-11-29 ENCOUNTER — LAB ENCOUNTER (OUTPATIENT)
Dept: LAB | Facility: HOSPITAL | Age: 77
End: 2022-11-29
Attending: INTERNAL MEDICINE
Payer: MEDICARE

## 2022-11-29 ENCOUNTER — OFFICE VISIT (OUTPATIENT)
Dept: PODIATRY CLINIC | Facility: CLINIC | Age: 77
End: 2022-11-29
Payer: MEDICARE

## 2022-11-29 DIAGNOSIS — B35.1 PAIN DUE TO ONYCHOMYCOSIS OF TOENAIL OF LEFT FOOT: ICD-10-CM

## 2022-11-29 DIAGNOSIS — M79.674 PAIN DUE TO ONYCHOMYCOSIS OF TOENAIL OF RIGHT FOOT: Primary | ICD-10-CM

## 2022-11-29 DIAGNOSIS — N18.32 STAGE 3B CHRONIC KIDNEY DISEASE (HCC): ICD-10-CM

## 2022-11-29 DIAGNOSIS — M79.675 PAIN DUE TO ONYCHOMYCOSIS OF TOENAIL OF LEFT FOOT: ICD-10-CM

## 2022-11-29 DIAGNOSIS — B35.1 PAIN DUE TO ONYCHOMYCOSIS OF TOENAIL OF RIGHT FOOT: Primary | ICD-10-CM

## 2022-11-29 LAB
ALBUMIN SERPL-MCNC: 3.8 G/DL (ref 3.4–5)
ANION GAP SERPL CALC-SCNC: 4 MMOL/L (ref 0–18)
BASOPHILS # BLD AUTO: 0.06 X10(3) UL (ref 0–0.2)
BASOPHILS NFR BLD AUTO: 1.1 %
BUN BLD-MCNC: 23 MG/DL (ref 7–18)
BUN/CREAT SERPL: 16 (ref 10–20)
CALCIUM BLD-MCNC: 9.4 MG/DL (ref 8.5–10.1)
CHLORIDE SERPL-SCNC: 106 MMOL/L (ref 98–112)
CO2 SERPL-SCNC: 32 MMOL/L (ref 21–32)
CREAT BLD-MCNC: 1.44 MG/DL
DEPRECATED RDW RBC AUTO: 43.6 FL (ref 35.1–46.3)
EOSINOPHIL # BLD AUTO: 0.25 X10(3) UL (ref 0–0.7)
EOSINOPHIL NFR BLD AUTO: 4.6 %
ERYTHROCYTE [DISTWIDTH] IN BLOOD BY AUTOMATED COUNT: 12.2 % (ref 11–15)
GFR SERPLBLD BASED ON 1.73 SQ M-ARVRAT: 37 ML/MIN/1.73M2 (ref 60–?)
GLUCOSE BLD-MCNC: 102 MG/DL (ref 70–99)
HCT VFR BLD AUTO: 40.7 %
HGB BLD-MCNC: 12.4 G/DL
IMM GRANULOCYTES # BLD AUTO: 0.01 X10(3) UL (ref 0–1)
IMM GRANULOCYTES NFR BLD: 0.2 %
LYMPHOCYTES # BLD AUTO: 1.67 X10(3) UL (ref 1–4)
LYMPHOCYTES NFR BLD AUTO: 30.6 %
MCH RBC QN AUTO: 29.6 PG (ref 26–34)
MCHC RBC AUTO-ENTMCNC: 30.5 G/DL (ref 31–37)
MCV RBC AUTO: 97.1 FL
MONOCYTES # BLD AUTO: 0.45 X10(3) UL (ref 0.1–1)
MONOCYTES NFR BLD AUTO: 8.2 %
NEUTROPHILS # BLD AUTO: 3.02 X10 (3) UL (ref 1.5–7.7)
NEUTROPHILS # BLD AUTO: 3.02 X10(3) UL (ref 1.5–7.7)
NEUTROPHILS NFR BLD AUTO: 55.3 %
OSMOLALITY SERPL CALC.SUM OF ELEC: 298 MOSM/KG (ref 275–295)
PHOSPHATE SERPL-MCNC: 3.4 MG/DL (ref 2.5–4.9)
PLATELET # BLD AUTO: 343 10(3)UL (ref 150–450)
POTASSIUM SERPL-SCNC: 4.4 MMOL/L (ref 3.5–5.1)
RBC # BLD AUTO: 4.19 X10(6)UL
SODIUM SERPL-SCNC: 142 MMOL/L (ref 136–145)
WBC # BLD AUTO: 5.5 X10(3) UL (ref 4–11)

## 2022-11-29 PROCEDURE — 11721 DEBRIDE NAIL 6 OR MORE: CPT | Performed by: PODIATRIST

## 2022-11-29 PROCEDURE — 36415 COLL VENOUS BLD VENIPUNCTURE: CPT

## 2022-11-29 PROCEDURE — 85025 COMPLETE CBC W/AUTO DIFF WBC: CPT

## 2022-11-29 PROCEDURE — 1126F AMNT PAIN NOTED NONE PRSNT: CPT | Performed by: PODIATRIST

## 2022-11-29 PROCEDURE — 80069 RENAL FUNCTION PANEL: CPT

## 2022-11-30 ENCOUNTER — TELEPHONE (OUTPATIENT)
Dept: NEPHROLOGY | Facility: CLINIC | Age: 77
End: 2022-11-30

## 2022-11-30 ENCOUNTER — HOSPITAL ENCOUNTER (OUTPATIENT)
Dept: BONE DENSITY | Facility: HOSPITAL | Age: 77
Discharge: HOME | End: 2022-11-30
Attending: STUDENT IN AN ORGANIZED HEALTH CARE EDUCATION/TRAINING PROGRAM
Payer: MEDICARE

## 2022-11-30 DIAGNOSIS — Z78.0 POSTMENOPAUSAL: ICD-10-CM

## 2022-11-30 DIAGNOSIS — E55.9 VITAMIN D DEFICIENCY: ICD-10-CM

## 2022-11-30 PROCEDURE — 77080 DXA BONE DENSITY AXIAL: CPT | Performed by: STUDENT IN AN ORGANIZED HEALTH CARE EDUCATION/TRAINING PROGRAM

## 2022-11-30 NOTE — TELEPHONE ENCOUNTER
----- Message from Marco Stroud MD sent at 11/30/2022  2:51 PM CST -----  Kidney function a little worse compared to 3 months ago. Make sure she is drinking plenty of fluids. Avoid nonsteroidals. Repeat standing order in 1 month to make sure things remain stable.

## 2022-12-29 ENCOUNTER — APPOINTMENT (OUTPATIENT)
Dept: LAB | Facility: HOSPITAL | Age: 77
End: 2022-12-29
Attending: INTERNAL MEDICINE
Payer: MEDICARE

## 2022-12-29 DIAGNOSIS — N18.32 STAGE 3B CHRONIC KIDNEY DISEASE (HCC): ICD-10-CM

## 2022-12-29 LAB
ALBUMIN SERPL-MCNC: 3.8 G/DL (ref 3.4–5)
ANION GAP SERPL CALC-SCNC: 8 MMOL/L (ref 0–18)
BASOPHILS # BLD AUTO: 0.04 X10(3) UL (ref 0–0.2)
BASOPHILS NFR BLD AUTO: 1 %
BUN BLD-MCNC: 23 MG/DL (ref 7–18)
BUN/CREAT SERPL: 17.8 (ref 10–20)
CALCIUM BLD-MCNC: 9.7 MG/DL (ref 8.5–10.1)
CHLORIDE SERPL-SCNC: 107 MMOL/L (ref 98–112)
CO2 SERPL-SCNC: 28 MMOL/L (ref 21–32)
CREAT BLD-MCNC: 1.29 MG/DL
DEPRECATED RDW RBC AUTO: 41.7 FL (ref 35.1–46.3)
EOSINOPHIL # BLD AUTO: 0.22 X10(3) UL (ref 0–0.7)
EOSINOPHIL NFR BLD AUTO: 5.3 %
ERYTHROCYTE [DISTWIDTH] IN BLOOD BY AUTOMATED COUNT: 12.1 % (ref 11–15)
GFR SERPLBLD BASED ON 1.73 SQ M-ARVRAT: 42 ML/MIN/1.73M2 (ref 60–?)
GLUCOSE BLD-MCNC: 107 MG/DL (ref 70–99)
HCT VFR BLD AUTO: 42.3 %
HGB BLD-MCNC: 13.3 G/DL
IMM GRANULOCYTES # BLD AUTO: 0.01 X10(3) UL (ref 0–1)
IMM GRANULOCYTES NFR BLD: 0.2 %
LYMPHOCYTES # BLD AUTO: 1.24 X10(3) UL (ref 1–4)
LYMPHOCYTES NFR BLD AUTO: 30.1 %
MCH RBC QN AUTO: 29.4 PG (ref 26–34)
MCHC RBC AUTO-ENTMCNC: 31.4 G/DL (ref 31–37)
MCV RBC AUTO: 93.6 FL
MONOCYTES # BLD AUTO: 0.32 X10(3) UL (ref 0.1–1)
MONOCYTES NFR BLD AUTO: 7.8 %
NEUTROPHILS # BLD AUTO: 2.29 X10 (3) UL (ref 1.5–7.7)
NEUTROPHILS # BLD AUTO: 2.29 X10(3) UL (ref 1.5–7.7)
NEUTROPHILS NFR BLD AUTO: 55.6 %
OSMOLALITY SERPL CALC.SUM OF ELEC: 300 MOSM/KG (ref 275–295)
PHOSPHATE SERPL-MCNC: 2.9 MG/DL (ref 2.5–4.9)
PLATELET # BLD AUTO: 351 10(3)UL (ref 150–450)
POTASSIUM SERPL-SCNC: 4.2 MMOL/L (ref 3.5–5.1)
RBC # BLD AUTO: 4.52 X10(6)UL
SODIUM SERPL-SCNC: 143 MMOL/L (ref 136–145)
WBC # BLD AUTO: 4.1 X10(3) UL (ref 4–11)

## 2022-12-29 PROCEDURE — 36415 COLL VENOUS BLD VENIPUNCTURE: CPT

## 2022-12-29 PROCEDURE — 85025 COMPLETE CBC W/AUTO DIFF WBC: CPT

## 2022-12-29 PROCEDURE — 80069 RENAL FUNCTION PANEL: CPT

## 2023-01-07 DIAGNOSIS — I10 ESSENTIAL HYPERTENSION: ICD-10-CM

## 2023-01-07 DIAGNOSIS — F32.A MILD DEPRESSION: ICD-10-CM

## 2023-01-07 DIAGNOSIS — E78.5 HYPERLIPIDEMIA, UNSPECIFIED HYPERLIPIDEMIA TYPE: ICD-10-CM

## 2023-01-07 NOTE — TELEPHONE ENCOUNTER
Pended prescription, routed to RN triage to run for protocol , thanks. Patient requesting refills for her ;  1.amlodipine  2.atorvastatin  3.citalopram   Pharmacy verified.

## 2023-01-09 RX ORDER — AMLODIPINE BESYLATE 2.5 MG/1
2.5 TABLET ORAL DAILY
Qty: 90 TABLET | Refills: 1 | Status: SHIPPED | OUTPATIENT
Start: 2023-01-09 | End: 2024-01-04

## 2023-01-09 RX ORDER — CITALOPRAM 10 MG/1
10 TABLET ORAL
Qty: 90 TABLET | Refills: 1 | Status: SHIPPED | OUTPATIENT
Start: 2023-01-09

## 2023-01-09 RX ORDER — PRAVASTATIN SODIUM 40 MG
40 TABLET ORAL NIGHTLY
Qty: 90 TABLET | Refills: 1 | Status: SHIPPED | OUTPATIENT
Start: 2023-01-09

## 2023-02-13 ENCOUNTER — OFFICE VISIT (OUTPATIENT)
Dept: PODIATRY CLINIC | Facility: CLINIC | Age: 78
End: 2023-02-13

## 2023-02-13 DIAGNOSIS — B35.1 ONYCHOMYCOSIS: Primary | ICD-10-CM

## 2023-02-13 DIAGNOSIS — M79.674 TOE PAIN, BILATERAL: ICD-10-CM

## 2023-02-13 DIAGNOSIS — M79.675 TOE PAIN, BILATERAL: ICD-10-CM

## 2023-02-13 DIAGNOSIS — L84 FOOT CALLUS: ICD-10-CM

## 2023-02-13 PROCEDURE — 1126F AMNT PAIN NOTED NONE PRSNT: CPT | Performed by: PODIATRIST

## 2023-02-13 PROCEDURE — 99213 OFFICE O/P EST LOW 20 MIN: CPT | Performed by: PODIATRIST

## 2023-02-21 ENCOUNTER — LAB ENCOUNTER (OUTPATIENT)
Dept: LAB | Facility: HOSPITAL | Age: 78
End: 2023-02-21
Attending: INTERNAL MEDICINE
Payer: MEDICARE

## 2023-02-21 DIAGNOSIS — N18.32 STAGE 3B CHRONIC KIDNEY DISEASE (HCC): ICD-10-CM

## 2023-02-21 LAB
ALBUMIN SERPL-MCNC: 3.8 G/DL (ref 3.4–5)
ANION GAP SERPL CALC-SCNC: 5 MMOL/L (ref 0–18)
BASOPHILS # BLD AUTO: 0.07 X10(3) UL (ref 0–0.2)
BASOPHILS NFR BLD AUTO: 1.4 %
BUN BLD-MCNC: 18 MG/DL (ref 7–18)
BUN/CREAT SERPL: 14 (ref 10–20)
CALCIUM BLD-MCNC: 9.3 MG/DL (ref 8.5–10.1)
CHLORIDE SERPL-SCNC: 111 MMOL/L (ref 98–112)
CO2 SERPL-SCNC: 30 MMOL/L (ref 21–32)
CREAT BLD-MCNC: 1.29 MG/DL
DEPRECATED RDW RBC AUTO: 42.4 FL (ref 35.1–46.3)
EOSINOPHIL # BLD AUTO: 0.28 X10(3) UL (ref 0–0.7)
EOSINOPHIL NFR BLD AUTO: 5.8 %
ERYTHROCYTE [DISTWIDTH] IN BLOOD BY AUTOMATED COUNT: 12.2 % (ref 11–15)
GFR SERPLBLD BASED ON 1.73 SQ M-ARVRAT: 42 ML/MIN/1.73M2 (ref 60–?)
GLUCOSE BLD-MCNC: 103 MG/DL (ref 70–99)
HCT VFR BLD AUTO: 39.6 %
HGB BLD-MCNC: 12.3 G/DL
IMM GRANULOCYTES # BLD AUTO: 0.01 X10(3) UL (ref 0–1)
IMM GRANULOCYTES NFR BLD: 0.2 %
LYMPHOCYTES # BLD AUTO: 1.4 X10(3) UL (ref 1–4)
LYMPHOCYTES NFR BLD AUTO: 28.9 %
MCH RBC QN AUTO: 29.3 PG (ref 26–34)
MCHC RBC AUTO-ENTMCNC: 31.1 G/DL (ref 31–37)
MCV RBC AUTO: 94.3 FL
MONOCYTES # BLD AUTO: 0.42 X10(3) UL (ref 0.1–1)
MONOCYTES NFR BLD AUTO: 8.7 %
NEUTROPHILS # BLD AUTO: 2.67 X10 (3) UL (ref 1.5–7.7)
NEUTROPHILS # BLD AUTO: 2.67 X10(3) UL (ref 1.5–7.7)
NEUTROPHILS NFR BLD AUTO: 55 %
OSMOLALITY SERPL CALC.SUM OF ELEC: 304 MOSM/KG (ref 275–295)
PHOSPHATE SERPL-MCNC: 2.7 MG/DL (ref 2.5–4.9)
PLATELET # BLD AUTO: 319 10(3)UL (ref 150–450)
POTASSIUM SERPL-SCNC: 4.7 MMOL/L (ref 3.5–5.1)
RBC # BLD AUTO: 4.2 X10(6)UL
SODIUM SERPL-SCNC: 146 MMOL/L (ref 136–145)
WBC # BLD AUTO: 4.9 X10(3) UL (ref 4–11)

## 2023-02-21 PROCEDURE — 85025 COMPLETE CBC W/AUTO DIFF WBC: CPT

## 2023-02-21 PROCEDURE — 36415 COLL VENOUS BLD VENIPUNCTURE: CPT

## 2023-02-21 PROCEDURE — 80069 RENAL FUNCTION PANEL: CPT

## 2023-02-27 ENCOUNTER — OFFICE VISIT (OUTPATIENT)
Dept: NEPHROLOGY | Facility: CLINIC | Age: 78
End: 2023-02-27

## 2023-02-27 VITALS
HEIGHT: 63 IN | HEART RATE: 75 BPM | WEIGHT: 180 LBS | DIASTOLIC BLOOD PRESSURE: 82 MMHG | SYSTOLIC BLOOD PRESSURE: 175 MMHG | BODY MASS INDEX: 31.89 KG/M2

## 2023-02-27 DIAGNOSIS — N18.32 STAGE 3B CHRONIC KIDNEY DISEASE (HCC): Primary | ICD-10-CM

## 2023-02-27 PROCEDURE — 99214 OFFICE O/P EST MOD 30 MIN: CPT | Performed by: INTERNAL MEDICINE

## 2023-02-27 NOTE — PROGRESS NOTES
02/27/23        Patient: Geovanna Pereira   YOB: 1945   Date of Visit: 2/27/2023       Dear  Dr. Jennifer Sr MD,      Thank you for referring Geovanna Pereira to my practice. Please find my assessment and plan below. As you know she is a 21-year-old female with a history of hypertension, hypercholesterolemia, melanoma involving her right eye, depression who I now had the pleasure of seeing for follow-up of chronic kidney disease stage III. Overall the patient states she has been doing well without any chest pain, shortness of breath, GI or urinary tract symptoms. She has been checking her blood pressures daily at home systolics in the 470U to 294J with diastolics in the 76H and 58D. She has minimal normal vision in the right eye and a portion is developing a cataract in the left eye. May need surgery in the future but for now they are trying to hold off for as long as they can. On physical exam initial blood pressure 175/82. Repeat was 136/80 with a pulse of 75 and she weighed 180 pounds. Her neck was supple without JVD. Lungs were clear. Heart revealed a regular rate and rhythm with an S4 but no gallops, murmurs or rubs. Abdomen was soft, flat, nontender without organomegaly, masses or bruits. Extremities revealed no edema. Reviewed her most recent labs done on February 21, 2023. Creatinine if anything slightly better down to 1.29 with an estimated GFR of 42 cc/min. Electrolytes and hemoglobin were okay. I therefore reassured the patient that overall renal function is stable. Blood pressure readings at home are very good. Therefore continue current medications. She will continue to do CBC and renal panels quarterly. I will see her again in 6 months for follow-up or sooner if clinically indicated. Maintain adequate hydration. Avoid nonsteroidals. Thank you again for allowing me to participate in the care of your patient.   If you have any questions please feel free to call.           Sincerely,   Marco Stroud MD   [facility] MEDICAL [facility], [address] [place] [address]  [address] [place] [address]  [address] [facility] [id number]    Document electronically generated by:  Marco Stroud MD

## 2023-02-27 NOTE — PATIENT INSTRUCTIONS
Continue to monitor your blood pressures. Repeat your kidney blood test in 3 months. Orders are in the computer.

## 2023-03-02 ENCOUNTER — TELEPHONE (OUTPATIENT)
Dept: NEPHROLOGY | Facility: CLINIC | Age: 78
End: 2023-03-02

## 2023-03-02 NOTE — TELEPHONE ENCOUNTER
Informed patient due to our doctors scheduled changed to call by mid April or early May ,pt verbalized understanding.

## 2023-03-02 NOTE — TELEPHONE ENCOUNTER
Pt states she needs to make an appt around end of August. The schedule is currently not showing for me on my end it goes up until end of July. I advised pt to call back to schedule the appt in a few days but pt is declining.  She is a little anxious to schedule the appt please call thanks

## 2023-03-25 DIAGNOSIS — I10 ESSENTIAL HYPERTENSION: ICD-10-CM

## 2023-03-25 RX ORDER — METOPROLOL SUCCINATE 25 MG/1
25 TABLET, EXTENDED RELEASE ORAL DAILY
Qty: 90 TABLET | Refills: 3 | Status: SHIPPED | OUTPATIENT
Start: 2023-03-25

## 2023-03-25 NOTE — TELEPHONE ENCOUNTER
Please review. Protocol failed/ No protocol      Requested Prescriptions   Pending Prescriptions Disp Refills    metoprolol succinate ER 25 MG Oral Tablet 24 Hr 90 tablet 2     Sig: Take 1 tablet (25 mg total) by mouth daily.        Hypertensive Medications Protocol Failed - 3/25/2023  8:54 AM        Failed - Last BP reading less than 140/90     BP Readings from Last 1 Encounters:  02/27/23 : (!) 175/82              Failed - EGFRCR or GFRNAA > 50     GFR Evaluation  EGFRCR: 42 , resulted on 2/21/2023          Passed - In person appointment in the past 12 or next 3 months     Recent Outpatient Visits              3 weeks ago Stage 3b chronic kidney disease [facility])    Yesi Roper, [address] [facility] MD Shirin    Office Visit    1 month ago Onychomycosis    [facility], [address] [facility] [facility], [place] Veronica Handy [place]    Office Visit    3 months ago Pain due to onychomycosis of toenail of right foot    Yesi Roper, [address], [place] JacOrlin gustafson [facility]    Office Visit    4 months ago Encounter for annual health examination    [facility] Bhanu Sim [facility]    Office Visit    6 months ago Multiple nevi    Yesi Roper, [facility], Tayler Morris MD    Office Visit          Future Appointments         Provider Department Appt Notes    In 3 weeks MD Yesi Wisdom, [facility] follow up full body check    In 1 month MD Yesi Newell, [facility] 6 month f/u    In 1 month Veronica Handy [facility], [address], [place] [facility] 3 MOS F/U    In 3 months MD Yesi Hanley, [address], [address] EP/ Dilated exam.    In 3 months MD Yesi Yoon, [address] [facility] [address], General [place] visit Passed - CMP or BMP in past 6 months     Recent Results (from the past 4392 hour(s))   COMP METABOLIC PANEL (14)    Collection Time: 11/07/22 11:29 AM   Result Value Ref Range    Glucose 89 70 - 99 mg/dL    Sodium 140 136 - 145 mmol/L    Potassium 4.0 3.5 - 5.1 mmol/L    Chloride 108 98 - 112 mmol/L    CO2 25.0 21.0 - 32.0 mmol/L    Anion Gap 7 0 - 18 mmol/L    BUN 28 (H) 7 - 18 mg/dL    Creatinine 1.31 (H) 0.55 - 1.02 mg/dL    BUN/CREA Ratio 21.4 (H) 10.0 - 20.0    Calcium, Total 9.6 8.5 - 10.1 mg/dL    Calculated Osmolality 295 275 - 295 mOsm/kg    eGFR-Cr 42 (L) >=60 mL/min/1.73m2    ALT 23 13 - 56 U/L    AST 25 15 - 37 U/L    Alkaline Phosphatase 80 55 - 142 U/L    Bilirubin, Total 0.5 0.1 - 2.0 mg/dL    Total Protein 7.8 6.4 - 8.2 g/dL    Albumin 4.0 3.4 - 5.0 g/dL    Globulin  3.8 2.8 - 4.4 g/dL    A/G Ratio 1.1 1.0 - 2.0    Patient Fasting for CMP? Yes      *Note: Due to a large number of results and/or encounters for the requested time period, some results have not been displayed. A complete set of results can be found in Results Review.                Passed - In person appointment or virtual visit in the past 6 months     Recent Outpatient Visits              3 weeks ago Stage 3b chronic kidney disease [facility])    [facility], [facility] [place]    Office Visit    1 month ago Onychomycosis    [facility], [address], [place] Soila Handy [place], [place]    Office Visit    3 months ago Pain due to onychomycosis of toenail of right foot    Ryan Guerin, [address], MarlowOrlin Boyle DPM    Office Visit    4 months ago Encounter for annual health examination    Cheryl Sharpe Rowan Grime, [place]    Office Visit    6 months ago Multiple nevi    Ryan Guerin, [facility], Antonio Paul MD    Office Visit          Future Appointments Provider Department Appt Notes    In 3 weeks Angeline Marques MD [address], [address], [place] follow up full body check    In 1 month Jhon Bar MD [address], [facility] month f/u    In 1 month Adi Truong DPM [facility], [address], [address] 3 MOS F/U    In 3 months Gisele Cheema MD [address], [address], [place] EP/ Dilated exam.    In 3 months Tamiko Zhao MD [address], [address], [place] Annual visit                    Future Appointments         Provider Department Appt Notes    In 3 weeks Angeline Marques MD [address], [address] [place] [address] follow up full body check    In 1 month Jhon Bar MD [address], [address] [facility] 6 month f/u    In 1 month Romero Handy DPM [facility], [address], [address] 3 MOS F/U    In 3 months Gisele Cheema MD [address], [address], [place] EP/ Dilated exam.    In 3 months Tamiko Zhao MD [address], [address], [place] visit             Recent Outpatient Visits              3 weeks ago Stage 3b chronic kidney disease [facility])    [address], [address] [facility], [place], [place]    Office Visit    1 month ago Onychomycosis    [facility], [address], [place] Romero Handy [place]    Office Visit    3 months ago Pain due to onychomycosis of toenail of right foot    [employer], [address] [facility] [address], [place], [place]    Office Visit    4 months ago Encounter for annual health examination    Ramsey Connor, [place], [place] Severin, MD    Office Visit    6 months ago Multiple nevi    Yves Jean, Everett Amos MD    Office Visit

## 2023-04-11 ENCOUNTER — TELEPHONE (OUTPATIENT)
Dept: FAMILY MEDICINE CLINIC | Facility: CLINIC | Age: 78
End: 2023-04-11

## 2023-04-11 NOTE — TELEPHONE ENCOUNTER
Patient said she had one Shingles vaccine in 2011 in [address] [place] [address] in [place], Zostavax, 88658 unit/0.65ml, SOLR (merc); she states the record should be in her chart at [facility].    Her question for Dr Tiffany Gracia: does she need another one? [facility] is coming to her [facility] facility and offering Shringrix vaccine beginning tomorrow 4-12. Could Dr Ayana Gil nurse call her today please to advise?

## 2023-04-11 NOTE — TELEPHONE ENCOUNTER
Zostavax is only 50% effective at preventing shingles -- it is recommended to get Shingrix dosing, even if she has had Zostavax previously. Please update pt.

## 2023-04-12 ENCOUNTER — TELEPHONE (OUTPATIENT)
Dept: FAMILY MEDICINE CLINIC | Facility: CLINIC | Age: 78
End: 2023-04-12

## 2023-04-12 NOTE — TELEPHONE ENCOUNTER
No answer, left vm to call us back at her earliest convenience. Call is in regards to the shingles vaccine.

## 2023-04-12 NOTE — TELEPHONE ENCOUNTER
Patient notified with understanding. She will arrange shingles vaccine at her living facility. Pended 45 ml  for a 90 day supply     Last seen 7-20-18 with note to f/u in 3 months

## 2023-04-20 ENCOUNTER — OFFICE VISIT (OUTPATIENT)
Dept: DERMATOLOGY CLINIC | Facility: CLINIC | Age: 78
End: 2023-04-20

## 2023-04-20 DIAGNOSIS — L57.0 AK (ACTINIC KERATOSIS): ICD-10-CM

## 2023-04-20 DIAGNOSIS — B37.2 INTERTRIGINOUS CANDIDIASIS: ICD-10-CM

## 2023-04-20 DIAGNOSIS — Z86.018 HISTORY OF DYSPLASTIC NEVUS: ICD-10-CM

## 2023-04-20 DIAGNOSIS — C69.90 OCULAR MELANOMA, UNSPECIFIED LATERALITY (HCC): ICD-10-CM

## 2023-04-20 DIAGNOSIS — L82.1 SEBORRHEIC KERATOSES: ICD-10-CM

## 2023-04-20 DIAGNOSIS — D23.9 BENIGN NEOPLASM OF SKIN, UNSPECIFIED LOCATION: ICD-10-CM

## 2023-04-20 DIAGNOSIS — D22.9 MULTIPLE NEVI: Primary | ICD-10-CM

## 2023-04-20 PROCEDURE — 99213 OFFICE O/P EST LOW 20 MIN: CPT | Performed by: DERMATOLOGY

## 2023-04-20 PROCEDURE — 1126F AMNT PAIN NOTED NONE PRSNT: CPT | Performed by: DERMATOLOGY

## 2023-05-08 ENCOUNTER — TELEPHONE (OUTPATIENT)
Dept: FAMILY MEDICINE CLINIC | Facility: CLINIC | Age: 78
End: 2023-05-08

## 2023-05-08 NOTE — TELEPHONE ENCOUNTER
Patient called saying that she received a call that her appointment with Dr. Kendell Velazquez was canceled.      Warm transferred to [facility] to assist.

## 2023-05-22 ENCOUNTER — OFFICE VISIT (OUTPATIENT)
Dept: FAMILY MEDICINE CLINIC | Facility: CLINIC | Age: 78
End: 2023-05-22

## 2023-05-22 ENCOUNTER — LAB ENCOUNTER (OUTPATIENT)
Dept: LAB | Age: 78
End: 2023-05-22
Attending: STUDENT IN AN ORGANIZED HEALTH CARE EDUCATION/TRAINING PROGRAM
Payer: MEDICARE

## 2023-05-22 VITALS
OXYGEN SATURATION: 98 % | HEIGHT: 63 IN | DIASTOLIC BLOOD PRESSURE: 55 MMHG | TEMPERATURE: 97 F | SYSTOLIC BLOOD PRESSURE: 123 MMHG | WEIGHT: 179.19 LBS | BODY MASS INDEX: 31.75 KG/M2 | HEART RATE: 67 BPM

## 2023-05-22 DIAGNOSIS — E78.5 HYPERLIPIDEMIA, UNSPECIFIED HYPERLIPIDEMIA TYPE: ICD-10-CM

## 2023-05-22 DIAGNOSIS — I10 ESSENTIAL HYPERTENSION: ICD-10-CM

## 2023-05-22 DIAGNOSIS — N18.31 STAGE 3A CHRONIC KIDNEY DISEASE (HCC): Primary | ICD-10-CM

## 2023-05-22 DIAGNOSIS — F32.0 MAJOR DEPRESSIVE DISORDER, SINGLE EPISODE, MILD (HCC): ICD-10-CM

## 2023-05-22 DIAGNOSIS — N18.32 STAGE 3B CHRONIC KIDNEY DISEASE (HCC): ICD-10-CM

## 2023-05-22 DIAGNOSIS — G63 POLYNEUROPATHY IN OTHER DISEASES CLASSIFIED ELSEWHERE (HCC): ICD-10-CM

## 2023-05-22 LAB
ALBUMIN SERPL-MCNC: 3.8 G/DL (ref 3.4–5)
ALBUMIN/GLOB SERPL: 0.9 {RATIO} (ref 1–2)
ALP LIVER SERPL-CCNC: 79 U/L
ALT SERPL-CCNC: 23 U/L
ANION GAP SERPL CALC-SCNC: 6 MMOL/L (ref 0–18)
AST SERPL-CCNC: 28 U/L (ref 15–37)
BASOPHILS # BLD AUTO: 0.04 X10(3) UL (ref 0–0.2)
BASOPHILS NFR BLD AUTO: 0.8 %
BILIRUB SERPL-MCNC: 0.5 MG/DL (ref 0.1–2)
BILIRUB UR QL: NEGATIVE
BUN BLD-MCNC: 31 MG/DL (ref 7–18)
BUN/CREAT SERPL: 23.7 (ref 10–20)
CALCIUM BLD-MCNC: 10.1 MG/DL (ref 8.5–10.1)
CHLORIDE SERPL-SCNC: 106 MMOL/L (ref 98–112)
CHOLEST SERPL-MCNC: 193 MG/DL (ref ?–200)
CLARITY UR: CLEAR
CO2 SERPL-SCNC: 28 MMOL/L (ref 21–32)
CREAT BLD-MCNC: 1.31 MG/DL
DEPRECATED RDW RBC AUTO: 42.8 FL (ref 35.1–46.3)
EOSINOPHIL # BLD AUTO: 0.19 X10(3) UL (ref 0–0.7)
EOSINOPHIL NFR BLD AUTO: 3.6 %
ERYTHROCYTE [DISTWIDTH] IN BLOOD BY AUTOMATED COUNT: 12.3 % (ref 11–15)
FASTING PATIENT LIPID ANSWER: YES
FASTING STATUS PATIENT QL REPORTED: YES
GFR SERPLBLD BASED ON 1.73 SQ M-ARVRAT: 41 ML/MIN/1.73M2 (ref 60–?)
GLOBULIN PLAS-MCNC: 4.1 G/DL (ref 2.8–4.4)
GLUCOSE BLD-MCNC: 101 MG/DL (ref 70–99)
GLUCOSE UR-MCNC: NORMAL MG/DL
HCT VFR BLD AUTO: 40.5 %
HDLC SERPL-MCNC: 52 MG/DL (ref 40–59)
HGB BLD-MCNC: 12.8 G/DL
HGB UR QL STRIP.AUTO: NEGATIVE
IMM GRANULOCYTES # BLD AUTO: 0.01 X10(3) UL (ref 0–1)
IMM GRANULOCYTES NFR BLD: 0.2 %
KETONES UR-MCNC: NEGATIVE MG/DL
LDLC SERPL CALC-MCNC: 115 MG/DL (ref ?–100)
LEUKOCYTE ESTERASE UR QL STRIP.AUTO: NEGATIVE
LYMPHOCYTES # BLD AUTO: 1.34 X10(3) UL (ref 1–4)
LYMPHOCYTES NFR BLD AUTO: 25.4 %
MCH RBC QN AUTO: 29.6 PG (ref 26–34)
MCHC RBC AUTO-ENTMCNC: 31.6 G/DL (ref 31–37)
MCV RBC AUTO: 93.8 FL
MONOCYTES # BLD AUTO: 0.42 X10(3) UL (ref 0.1–1)
MONOCYTES NFR BLD AUTO: 8 %
NEUTROPHILS # BLD AUTO: 3.28 X10 (3) UL (ref 1.5–7.7)
NEUTROPHILS # BLD AUTO: 3.28 X10(3) UL (ref 1.5–7.7)
NEUTROPHILS NFR BLD AUTO: 62 %
NITRITE UR QL STRIP.AUTO: NEGATIVE
NONHDLC SERPL-MCNC: 141 MG/DL (ref ?–130)
OSMOLALITY SERPL CALC.SUM OF ELEC: 297 MOSM/KG (ref 275–295)
PH UR: 5.5 [PH] (ref 5–8)
PHOSPHATE SERPL-MCNC: 3.3 MG/DL (ref 2.5–4.9)
PLATELET # BLD AUTO: 352 10(3)UL (ref 150–450)
POTASSIUM SERPL-SCNC: 4.5 MMOL/L (ref 3.5–5.1)
PROT SERPL-MCNC: 7.9 G/DL (ref 6.4–8.2)
PROT UR-MCNC: NEGATIVE MG/DL
RBC # BLD AUTO: 4.32 X10(6)UL
SODIUM SERPL-SCNC: 140 MMOL/L (ref 136–145)
SP GR UR STRIP: 1.01 (ref 1–1.03)
TRIGL SERPL-MCNC: 146 MG/DL (ref 30–149)
TSI SER-ACNC: 0.73 MIU/ML (ref 0.36–3.74)
UROBILINOGEN UR STRIP-ACNC: NORMAL
VLDLC SERPL CALC-MCNC: 25 MG/DL (ref 0–30)
WBC # BLD AUTO: 5.3 X10(3) UL (ref 4–11)

## 2023-05-22 PROCEDURE — 84443 ASSAY THYROID STIM HORMONE: CPT

## 2023-05-22 PROCEDURE — 85025 COMPLETE CBC W/AUTO DIFF WBC: CPT

## 2023-05-22 PROCEDURE — 1126F AMNT PAIN NOTED NONE PRSNT: CPT | Performed by: STUDENT IN AN ORGANIZED HEALTH CARE EDUCATION/TRAINING PROGRAM

## 2023-05-22 PROCEDURE — 36415 COLL VENOUS BLD VENIPUNCTURE: CPT

## 2023-05-22 PROCEDURE — 84100 ASSAY OF PHOSPHORUS: CPT

## 2023-05-22 PROCEDURE — 80053 COMPREHEN METABOLIC PANEL: CPT

## 2023-05-22 PROCEDURE — 80061 LIPID PANEL: CPT

## 2023-05-22 PROCEDURE — 99214 OFFICE O/P EST MOD 30 MIN: CPT | Performed by: STUDENT IN AN ORGANIZED HEALTH CARE EDUCATION/TRAINING PROGRAM

## 2023-05-22 RX ORDER — PRAVASTATIN SODIUM 40 MG
40 TABLET ORAL NIGHTLY
Qty: 90 TABLET | Refills: 3 | Status: SHIPPED | OUTPATIENT
Start: 2023-05-22

## 2023-05-22 RX ORDER — AMLODIPINE BESYLATE 2.5 MG/1
2.5 TABLET ORAL DAILY
Qty: 90 TABLET | Refills: 3 | Status: SHIPPED | OUTPATIENT
Start: 2023-05-22 | End: 2024-05-16

## 2023-05-23 PROBLEM — F32.0 MAJOR DEPRESSIVE DISORDER, SINGLE EPISODE, MILD: Status: ACTIVE | Noted: 2023-05-23

## 2023-05-23 PROBLEM — F32.0 MAJOR DEPRESSIVE DISORDER, SINGLE EPISODE, MILD (HCC): Status: ACTIVE | Noted: 2023-05-23

## 2023-05-26 ENCOUNTER — OFFICE VISIT (OUTPATIENT)
Dept: PODIATRY CLINIC | Facility: CLINIC | Age: 78
End: 2023-05-26

## 2023-05-26 DIAGNOSIS — M79.674 TOE PAIN, BILATERAL: ICD-10-CM

## 2023-05-26 DIAGNOSIS — L84 FOOT CALLUS: ICD-10-CM

## 2023-05-26 DIAGNOSIS — B35.1 ONYCHOMYCOSIS: Primary | ICD-10-CM

## 2023-05-26 DIAGNOSIS — M79.675 TOE PAIN, BILATERAL: ICD-10-CM

## 2023-05-26 PROCEDURE — 1126F AMNT PAIN NOTED NONE PRSNT: CPT | Performed by: PODIATRIST

## 2023-05-26 PROCEDURE — 99213 OFFICE O/P EST LOW 20 MIN: CPT | Performed by: PODIATRIST

## 2023-06-28 ENCOUNTER — OFFICE VISIT (OUTPATIENT)
Dept: OPHTHALMOLOGY | Facility: CLINIC | Age: 78
End: 2023-06-28

## 2023-06-28 DIAGNOSIS — C69.91 OCULAR MELANOMA, RIGHT (HCC): Primary | ICD-10-CM

## 2023-06-28 DIAGNOSIS — H25.13 AGE-RELATED NUCLEAR CATARACT OF BOTH EYES: ICD-10-CM

## 2023-06-28 DIAGNOSIS — H43.392 FLOATER, VITREOUS, LEFT: ICD-10-CM

## 2023-06-28 PROCEDURE — 1126F AMNT PAIN NOTED NONE PRSNT: CPT | Performed by: OPHTHALMOLOGY

## 2023-06-28 PROCEDURE — 92014 COMPRE OPH EXAM EST PT 1/>: CPT | Performed by: OPHTHALMOLOGY

## 2023-06-28 PROCEDURE — 92015 DETERMINE REFRACTIVE STATE: CPT | Performed by: OPHTHALMOLOGY

## 2023-07-08 DIAGNOSIS — I10 ESSENTIAL HYPERTENSION: ICD-10-CM

## 2023-07-08 NOTE — TELEPHONE ENCOUNTER
Patient is calling and requesting a refill on the following medication. She stated she has two weeks worth of pills left.     triamterene-hydroCHLOROthiazide (DYAZIDE) 37.5-25 MG Oral Cap

## 2023-07-09 RX ORDER — TRIAMTERENE AND HYDROCHLOROTHIAZIDE 37.5; 25 MG/1; MG/1
CAPSULE ORAL
Qty: 12 CAPSULE | Refills: 3 | Status: SHIPPED | OUTPATIENT
Start: 2023-07-09

## 2023-07-09 NOTE — TELEPHONE ENCOUNTER
Please review. Protocol Failed or has No Protocol.     Requested Prescriptions   Pending Prescriptions Disp Refills    triamterene-hydroCHLOROthiazide (DYAZIDE) 37.5-25 MG Oral Cap 30 capsule 1     Sicapsule 1 times a week       Hypertensive Medications Protocol Failed - 2023 12:02 PM        Failed - EGFRCR or GFRNAA > 50     GFR Evaluation  EGFRCR: 41 , resulted on 2023          Passed - In person appointment in the past 12 or next 3 months     Recent Outpatient Visits              1 week ago Ocular melanoma, right ([address]    Astrid Alvarez, [address] [facility] [address], Darci Noriega MD    Office Visit    1 month ago Onychomycosis    [facility], [address], [place]    Office Visit    1 month ago Stage 3a chronic kidney disease [facility])    Rm Heath [place] Jillian Osler, MD    Office Visit    2 months ago Multiple nevi    Astrid Alvarez, [facility], Nolan Minor MD    Office Visit    4 months ago Stage 3b chronic kidney disease [facility])    Sae Saleem MD    Office Visit          Future Appointments         Provider Department Appt Notes    In 1 week MD Astrid Story, [address], [facility] Annual visit    In 1 month MD Astrid Montgomery, [address] [facility] 6 month follow up    In 1 month Debbie Handy DPM [facility], [address], [address] 3 MOS F/U    In 3 months MD Astrid Bloom, [address], [place] 6 Month F/u    In 4 months Jillian Osler, MD Laraine Neu, [facility] last medicare px 22    In 12 months MD Astrid Hobbs, [address] [facility] EP/ Dilated exam.               Passed - Last BP reading less than 140/90     BP Readings from Last 1 Encounters:  05/22/23 : 123/55              Passed - CMP or BMP in past 6 months     Recent Results (from the past 4392 hour(s))   COMP METABOLIC PANEL (14)    Collection Time: 05/22/23 10:02 AM   Result Value Ref Range    Glucose 101 (H) 70 - 99 mg/dL    Sodium 140 136 - 145 mmol/L    Potassium 4.5 3.5 - 5.1 mmol/L    Chloride 106 98 - 112 mmol/L    CO2 28.0 21.0 - 32.0 mmol/L    Anion Gap 6 0 - 18 mmol/L    BUN 31 (H) 7 - 18 mg/dL    Creatinine 1.31 (H) 0.55 - 1.02 mg/dL    BUN/CREA Ratio 23.7 (H) 10.0 - 20.0    Calcium, Total 10.1 8.5 - 10.1 mg/dL    Calculated Osmolality 297 (H) 275 - 295 mOsm/kg    eGFR-Cr 41 (L) >=60 mL/min/1.73m2    ALT 23 13 - 56 U/L    AST 28 15 - 37 U/L    Alkaline Phosphatase 79 55 - 142 U/L    Bilirubin, Total 0.5 0.1 - 2.0 mg/dL    Total Protein 7.9 6.4 - 8.2 g/dL    Albumin 3.8 3.4 - 5.0 g/dL    Globulin  4.1 2.8 - 4.4 g/dL    A/G Ratio 0.9 (L) 1.0 - 2.0    Patient Fasting for CMP? Yes      *Note: Due to a large number of results and/or encounters for the requested time period, some results have not been displayed. A complete set of results can be found in Results Review.                Passed - In person appointment or virtual visit in the past 6 months     Recent Outpatient Visits              1 week ago Ocular melanoma, right ([address]    Girish Foster, [address], [facility] MD MERLY    Office Visit    1 month ago [facility]omycosis    [facility], [address], [place] Mallorie Handy, [place]    Office Visit    1 month ago Stage 3a chronic kidney disease [facility])    Deon Perla [place] Kirti Hendricks MD    Office Visit    2 months ago Multiple nevi    Girish Foster, [facility], Sharyon Hodgkins, MD    Office Visit    4 months ago Stage 3b chronic kidney disease [facility])    Girish Foster, [address] [place] [address] MD Trell Office Visit          Future Appointments         Provider Department Appt Notes    In 1 week MD Aranza Celis, [address], [facility] Annual visit    In 1 month MD Aarnza Thomas, [facility] 6 month follow up    In 1 month Jessica Handy, [facility], [address], [facility] NAIL CARE 3 MOS F/U    In 3 months MD Aranza Rawls, [address], [place] 6 Month F/u    In 4 months MD Aranza Patel, [place] last medicare px 11/7/22    In 12 months MD Aranza Murphy, [address], [facility] EP/ Dilated exam.                    Recent Outpatient Visits              1 week ago Ocular melanoma, right [facility])    Aranza Frazier, [address], [place]    Office Visit    1 month ago Onychomycosis    [facility], [address], [place] Jessica Handy [place]    Office Visit    1 month ago Stage 3a chronic kidney disease [facility])    Sarthak Patel MD    Office Visit    2 months ago Multiple nevi    Aranza Frazier, [facility], Simi Lawson MD    Office Visit    4 months ago Stage 3b chronic kidney disease [facility])    Aranza Frazier, [address] [place], [place] [address]Sae MD    Office Visit            Future Appointments         Provider Department Appt Notes    In 1 week MD Aranza Celis, [address], [facility] Annual visit    In 1 month MD Aranza Thomas, [facility] 6 month follow up    In 1 month Jessica Handy DPM [facility], [address], [facility] NAIL CARE 3 MOS F/U    In 3 months MD Aranza Rawls, [address], [place] 6 Month F/u    In 4 months MD [facility], [facility] last medicare px 11/7/22    In 12 months Lissy Adams MD [address], [address], [place] EP/ Dilated exam.

## 2023-08-22 ENCOUNTER — OFFICE VISIT (OUTPATIENT)
Dept: OTOLARYNGOLOGY | Facility: CLINIC | Age: 78
End: 2023-08-22

## 2023-08-22 ENCOUNTER — LAB ENCOUNTER (OUTPATIENT)
Dept: LAB | Facility: HOSPITAL | Age: 78
End: 2023-08-22
Attending: INTERNAL MEDICINE
Payer: MEDICARE

## 2023-08-22 VITALS — WEIGHT: 180 LBS | HEIGHT: 63 IN | BODY MASS INDEX: 31.89 KG/M2

## 2023-08-22 DIAGNOSIS — N18.32 STAGE 3B CHRONIC KIDNEY DISEASE (HCC): ICD-10-CM

## 2023-08-22 DIAGNOSIS — H61.23 BILATERAL IMPACTED CERUMEN: Primary | ICD-10-CM

## 2023-08-22 LAB
ALBUMIN SERPL-MCNC: 3.7 G/DL (ref 3.4–5)
ANION GAP SERPL CALC-SCNC: 6 MMOL/L (ref 0–18)
BASOPHILS # BLD AUTO: 0.07 X10(3) UL (ref 0–0.2)
BASOPHILS NFR BLD AUTO: 2.1 %
BUN BLD-MCNC: 20 MG/DL (ref 7–18)
BUN/CREAT SERPL: 15.6 (ref 10–20)
CALCIUM BLD-MCNC: 9.2 MG/DL (ref 8.5–10.1)
CHLORIDE SERPL-SCNC: 110 MMOL/L (ref 98–112)
CO2 SERPL-SCNC: 26 MMOL/L (ref 21–32)
CREAT BLD-MCNC: 1.28 MG/DL
DEPRECATED RDW RBC AUTO: 42.5 FL (ref 35.1–46.3)
EGFRCR SERPLBLD CKD-EPI 2021: 43 ML/MIN/1.73M2 (ref 60–?)
EOSINOPHIL # BLD AUTO: 0.26 X10(3) UL (ref 0–0.7)
EOSINOPHIL NFR BLD AUTO: 7.7 %
ERYTHROCYTE [DISTWIDTH] IN BLOOD BY AUTOMATED COUNT: 12.3 % (ref 11–15)
GLUCOSE BLD-MCNC: 101 MG/DL (ref 70–99)
HCT VFR BLD AUTO: 41.3 %
HGB BLD-MCNC: 13.1 G/DL
IMM GRANULOCYTES # BLD AUTO: 0.01 X10(3) UL (ref 0–1)
IMM GRANULOCYTES NFR BLD: 0.3 %
LYMPHOCYTES # BLD AUTO: 0.96 X10(3) UL (ref 1–4)
LYMPHOCYTES NFR BLD AUTO: 28.5 %
MCH RBC QN AUTO: 29.8 PG (ref 26–34)
MCHC RBC AUTO-ENTMCNC: 31.7 G/DL (ref 31–37)
MCV RBC AUTO: 94.1 FL
MONOCYTES # BLD AUTO: 0.39 X10(3) UL (ref 0.1–1)
MONOCYTES NFR BLD AUTO: 11.6 %
NEUTROPHILS # BLD AUTO: 1.68 X10 (3) UL (ref 1.5–7.7)
NEUTROPHILS # BLD AUTO: 1.68 X10(3) UL (ref 1.5–7.7)
NEUTROPHILS NFR BLD AUTO: 49.8 %
OSMOLALITY SERPL CALC.SUM OF ELEC: 297 MOSM/KG (ref 275–295)
PHOSPHATE SERPL-MCNC: 3.4 MG/DL (ref 2.5–4.9)
PLATELET # BLD AUTO: 304 10(3)UL (ref 150–450)
POTASSIUM SERPL-SCNC: 4.2 MMOL/L (ref 3.5–5.1)
RBC # BLD AUTO: 4.39 X10(6)UL
SODIUM SERPL-SCNC: 142 MMOL/L (ref 136–145)
WBC # BLD AUTO: 3.4 X10(3) UL (ref 4–11)

## 2023-08-22 PROCEDURE — 69210 REMOVE IMPACTED EAR WAX UNI: CPT | Performed by: OTOLARYNGOLOGY

## 2023-08-22 PROCEDURE — 36415 COLL VENOUS BLD VENIPUNCTURE: CPT

## 2023-08-22 PROCEDURE — 85025 COMPLETE CBC W/AUTO DIFF WBC: CPT

## 2023-08-22 PROCEDURE — 80069 RENAL FUNCTION PANEL: CPT

## 2023-08-28 ENCOUNTER — OFFICE VISIT (OUTPATIENT)
Dept: NEPHROLOGY | Facility: CLINIC | Age: 78
End: 2023-08-28

## 2023-08-28 VITALS
HEART RATE: 65 BPM | DIASTOLIC BLOOD PRESSURE: 67 MMHG | WEIGHT: 181 LBS | SYSTOLIC BLOOD PRESSURE: 111 MMHG | BODY MASS INDEX: 32 KG/M2

## 2023-08-28 DIAGNOSIS — N18.32 STAGE 3B CHRONIC KIDNEY DISEASE (HCC): Primary | ICD-10-CM

## 2023-08-28 PROCEDURE — 1126F AMNT PAIN NOTED NONE PRSNT: CPT | Performed by: INTERNAL MEDICINE

## 2023-08-28 PROCEDURE — 99214 OFFICE O/P EST MOD 30 MIN: CPT | Performed by: INTERNAL MEDICINE

## 2023-09-07 ENCOUNTER — NURSE TRIAGE (OUTPATIENT)
Dept: FAMILY MEDICINE CLINIC | Facility: CLINIC | Age: 78
End: 2023-09-07

## 2023-09-07 NOTE — TELEPHONE ENCOUNTER
Action Requested: Summary for Provider     []  Critical Lab, Recommendations Needed  [] Need Additional Advice  [x]   FYI    []   Need Orders  [] Need Medications Sent to Pharmacy  []  Other     SUMMARY: Patient states she passed a stool with bright red blood when she wiped today. A few days ago she had one episode of bloody diarrhea. Patient states she has history of colon polyps and had colonoscopy \"many years ago\"  Patient denies lightheadedness or bleeding without stool. Per protocol, patient scheduled for an office visit with Dr. Emil Vance tomorrow at [address] [place] [address]. Patient will call back with any new or worsening symptoms.      Reason for call: Blood In Stool  Onset: 2 days ago  Reason for Disposition   MILD rectal bleeding (more than just a few drops or streaks)    Protocols used: Rectal Bleeding-A-OH

## 2023-09-08 ENCOUNTER — APPOINTMENT (OUTPATIENT)
Dept: LAB | Facility: HOSPITAL | Age: 78
End: 2023-09-08
Attending: STUDENT IN AN ORGANIZED HEALTH CARE EDUCATION/TRAINING PROGRAM
Payer: MEDICARE

## 2023-09-08 ENCOUNTER — TELEPHONE (OUTPATIENT)
Dept: FAMILY MEDICINE CLINIC | Facility: CLINIC | Age: 78
End: 2023-09-08

## 2023-09-08 ENCOUNTER — OFFICE VISIT (OUTPATIENT)
Dept: FAMILY MEDICINE CLINIC | Facility: CLINIC | Age: 78
End: 2023-09-08

## 2023-09-08 VITALS
OXYGEN SATURATION: 98 % | BODY MASS INDEX: 31.79 KG/M2 | WEIGHT: 179.38 LBS | HEART RATE: 60 BPM | SYSTOLIC BLOOD PRESSURE: 162 MMHG | DIASTOLIC BLOOD PRESSURE: 72 MMHG | HEIGHT: 63 IN

## 2023-09-08 DIAGNOSIS — K92.1 BLOOD CLOTS IN STOOL: Primary | ICD-10-CM

## 2023-09-08 PROCEDURE — 87045 FECES CULTURE AEROBIC BACT: CPT

## 2023-09-08 PROCEDURE — 87046 STOOL CULTR AEROBIC BACT EA: CPT

## 2023-09-08 PROCEDURE — 1126F AMNT PAIN NOTED NONE PRSNT: CPT | Performed by: STUDENT IN AN ORGANIZED HEALTH CARE EDUCATION/TRAINING PROGRAM

## 2023-09-08 PROCEDURE — 87077 CULTURE AEROBIC IDENTIFY: CPT

## 2023-09-08 PROCEDURE — 87427 SHIGA-LIKE TOXIN AG IA: CPT

## 2023-09-08 PROCEDURE — 99213 OFFICE O/P EST LOW 20 MIN: CPT | Performed by: STUDENT IN AN ORGANIZED HEALTH CARE EDUCATION/TRAINING PROGRAM

## 2023-09-09 ENCOUNTER — LAB ENCOUNTER (OUTPATIENT)
Dept: LAB | Facility: HOSPITAL | Age: 78
End: 2023-09-09
Attending: STUDENT IN AN ORGANIZED HEALTH CARE EDUCATION/TRAINING PROGRAM
Payer: MEDICARE

## 2023-09-09 DIAGNOSIS — K92.1 BLOOD CLOTS IN STOOL: ICD-10-CM

## 2023-09-11 ENCOUNTER — TELEPHONE (OUTPATIENT)
Dept: FAMILY MEDICINE CLINIC | Facility: CLINIC | Age: 78
End: 2023-09-11

## 2023-09-11 NOTE — TELEPHONE ENCOUNTER
Spoke with the patient,verified full name and     Informed her of message and she stated has not had any visible blood since Thursday.     Will call back if she needs assist with sooner appointment due to blood re-occurring

## 2023-09-11 NOTE — TELEPHONE ENCOUNTER
Patient calling ( identified name and  ) was instructed by Andrew Jimenez  to call back if patient cannot be seen by Dr. Biju Johns, no appointments available with him   until 2024    (having blood in her stool and bloody diarrhea )     Last office visit 2023      Patient did complete her stool culture on     Patient states she would not mind seeing   ,asking if she can see  or anyone in the  GI practice     Please advise and thank you.         Best call back number: Micah Silvestre  at [phone]

## 2023-09-11 NOTE — TELEPHONE ENCOUNTER
Ok to see with next available.    If she is still having recurrent blood in stool, please assist with prompt GI eval.

## 2023-09-13 ENCOUNTER — OFFICE VISIT (OUTPATIENT)
Facility: CLINIC | Age: 78
End: 2023-09-13

## 2023-09-13 ENCOUNTER — LAB ENCOUNTER (OUTPATIENT)
Dept: LAB | Facility: HOSPITAL | Age: 78
End: 2023-09-13
Attending: STUDENT IN AN ORGANIZED HEALTH CARE EDUCATION/TRAINING PROGRAM
Payer: MEDICARE

## 2023-09-13 VITALS
DIASTOLIC BLOOD PRESSURE: 74 MMHG | BODY MASS INDEX: 31.89 KG/M2 | WEIGHT: 180 LBS | SYSTOLIC BLOOD PRESSURE: 130 MMHG | HEART RATE: 67 BPM | HEIGHT: 63 IN

## 2023-09-13 DIAGNOSIS — K62.5 RECTAL BLEEDING: ICD-10-CM

## 2023-09-13 DIAGNOSIS — R10.30 LOWER ABDOMINAL PAIN: Primary | ICD-10-CM

## 2023-09-13 LAB
DEPRECATED HBV CORE AB SER IA-ACNC: 89.4 NG/ML
DEPRECATED RDW RBC AUTO: 42.5 FL (ref 35.1–46.3)
ERYTHROCYTE [DISTWIDTH] IN BLOOD BY AUTOMATED COUNT: 12.2 % (ref 11–15)
HCT VFR BLD AUTO: 42.4 %
HGB BLD-MCNC: 13.3 G/DL
IRON SATN MFR SERPL: 25 %
IRON SERPL-MCNC: 92 UG/DL
MCH RBC QN AUTO: 29.4 PG (ref 26–34)
MCHC RBC AUTO-ENTMCNC: 31.4 G/DL (ref 31–37)
MCV RBC AUTO: 93.6 FL
PLATELET # BLD AUTO: 378 10(3)UL (ref 150–450)
RBC # BLD AUTO: 4.53 X10(6)UL
TIBC SERPL-MCNC: 370 UG/DL (ref 240–450)
TRANSFERRIN SERPL-MCNC: 248 MG/DL (ref 200–360)
WBC # BLD AUTO: 5.4 X10(3) UL (ref 4–11)

## 2023-09-13 PROCEDURE — 85027 COMPLETE CBC AUTOMATED: CPT

## 2023-09-13 PROCEDURE — 1126F AMNT PAIN NOTED NONE PRSNT: CPT | Performed by: STUDENT IN AN ORGANIZED HEALTH CARE EDUCATION/TRAINING PROGRAM

## 2023-09-13 PROCEDURE — 99204 OFFICE O/P NEW MOD 45 MIN: CPT | Performed by: STUDENT IN AN ORGANIZED HEALTH CARE EDUCATION/TRAINING PROGRAM

## 2023-09-13 PROCEDURE — 83540 ASSAY OF IRON: CPT

## 2023-09-13 PROCEDURE — 36415 COLL VENOUS BLD VENIPUNCTURE: CPT

## 2023-09-13 PROCEDURE — 82728 ASSAY OF FERRITIN: CPT

## 2023-09-13 PROCEDURE — 84466 ASSAY OF TRANSFERRIN: CPT

## 2023-09-15 ENCOUNTER — TELEPHONE (OUTPATIENT)
Facility: CLINIC | Age: 78
End: 2023-09-15

## 2023-09-19 ENCOUNTER — TELEPHONE (OUTPATIENT)
Facility: CLINIC | Age: 78
End: 2023-09-19

## 2023-09-19 NOTE — TELEPHONE ENCOUNTER
Pt states that she is supposed to have stool test done but she does not have loose stools any longer so they can accept sample. Please call.

## 2023-09-19 NOTE — TELEPHONE ENCOUNTER
I spoke to the pt and let her know I cancelled the c diff test since her stool is formed    Pt is in agreement

## 2023-09-22 ENCOUNTER — OFFICE VISIT (OUTPATIENT)
Dept: PODIATRY CLINIC | Facility: CLINIC | Age: 78
End: 2023-09-22

## 2023-09-22 DIAGNOSIS — B35.1 ONYCHOMYCOSIS: Primary | ICD-10-CM

## 2023-09-22 DIAGNOSIS — M79.675 TOE PAIN, BILATERAL: ICD-10-CM

## 2023-09-22 DIAGNOSIS — M79.674 TOE PAIN, BILATERAL: ICD-10-CM

## 2023-09-22 DIAGNOSIS — L84 FOOT CALLUS: ICD-10-CM

## 2023-09-22 PROCEDURE — 99213 OFFICE O/P EST LOW 20 MIN: CPT | Performed by: PODIATRIST

## 2023-09-22 NOTE — PROGRESS NOTES
[address] [facility] Podiatry  Progress Note    Julian Torrez is a 77year old female. Patient presents with:  Toenail Care: 3 month nail care. Denies any pain or concerns. HPI:     This is a pleasant female with HTN and PMH of carotid endarterectomy, CKD stage 3. She presents to clinic today for nail care. She complains of long thick toenails which she is unable to trim herself. She also has calluses on the bottom of her great toes. Allergies: Adhesive Tape   Current Outpatient Medications   Medication Sig Dispense Refill    citalopram 10 MG Oral Tab Take 1 tablet (10 mg total) by mouth once daily. 90 tablet 1    triamterene-hydroCHLOROthiazide (DYAZIDE) 37.5-25 MG Oral Cap 1capsule 1 times a week 12 capsule 3    pravastatin 40 MG Oral Tab Take 1 tablet (40 mg total) by mouth nightly. 90 tablet 3    amLODIPine 2.5 MG Oral Tab Take 1 tablet (2.5 mg total) by mouth daily. 90 tablet 3    metoprolol succinate ER 25 MG Oral Tablet 24 Hr Take 1 tablet (25 mg total) by mouth daily. 90 tablet 3    aspirin 325 MG Oral Tab Take 0.5 tablets (162.5 mg total) by mouth daily. 90 tablet 1    Calcium-Phosphorus-Vitamin D (CITRACAL +D3 OR) Take 1 tablet by mouth daily. Vitamin B-12 (VITAMIN B12) 1000 MCG Oral Tab Take 1 tablet (1,000 mcg total) by mouth daily. loratadine (CLARITIN) 10 MG Oral Tab Take 1 tablet (10 mg total) by mouth daily. folic acid (FOLVITE) 939 MCG Oral Tab Take 1 tablet (400 mcg total) by mouth daily. Acetaminophen (TYLENOL EXTRA STRENGTH OR) Take  by mouth. Multiple Vitamins-Minerals (CENTRUM SILVER) Oral Tab Take 1 tablet by mouth daily.           Past Medical History:   Diagnosis Date    Allergic rhinitis     Anxiety state, unspecified     Carotid artery disease ([address] 2010    Cataracts, both eyes 2013    OU    Depression     Kidney disease, chronic, stage III (GFR 30-59 ml/min) ([facility])     Melanoma, choroid, right eye ([address] 2010    OD, Radiation plaque for 7 days; per NG    Other and unspecified hyperlipidemia     Retinal scar of right eye     \"Retinal scarring due to melanoma/ radiation\"    Unspecified essential hypertension       Past Surgical History:   Procedure Laterality Date    CHOLECYSTECTOMY      COLONOSCOPY      HYSTERECTOMY      OTHER SURGICAL HISTORY      Melanoma of Choroid: Radiation plaque for 7 days; treated      Family History   Problem Relation Age of Onset    Heart Attack Father 47        MI; per NG    Stroke Mother 66        per NG    Breast Cancer Mother 68        per NG    Heart Disorder Mother         CHF; per NG    Hypertension Mother         per NG    Stroke Maternal Grandmother 71        Cause of death; per NG    Diabetes Other         Aunt; per NG    Lipids Other         Family H/o Hyperlipidemia; per NG    Hypertension Maternal Uncle         per NG    Breast Cancer Paternal Aunt 79    Glaucoma Neg     Macular degeneration Neg       Social History    Socioeconomic History      Marital status:     Tobacco Use      Smoking status: Former        Packs/day: 0.50        Years: 14.00        Additional pack years: 0.00        Total pack years: 7.00        Types: Cigarettes        Quit date: 1979        Years since quittin.7      Smokeless tobacco: Never    Vaping Use      Vaping Use: Never used    Substance and Sexual Activity      Alcohol use: No        Alcohol/week: 0.0 standard drinks of alcohol      Drug use: No      Sexual activity: Not Currently        Partners: Male    Other Topics      Concerns:        Caffeine Concern: Yes          Coffee, 2 cups; per NG        Pt has a pacemaker: No        Pt has a defibrillator: No        Reaction to local anesthetic: No          REVIEW OF SYSTEMS:   Denies nausea, fever, chills  No calf pain  No other muscle or joint aches  Denies chest pain or shortness of breath. EXAM:   LMP  (LMP Unknown)     Constitutional:   Patient in no apparent distress. Well kept.  Of normal body habitus. Alert and oriented to person, place, and time. Vascular Examination:  DP pulse is 2/4  PT pulse is NP  Capillary refill is immediate  Integumentary Examination:   The patient's nails appear incurvated, thickened, elongated, dystrophic, discolored with subungual debris 1-5 right, 1-5  left nails. Callus to b/l plantar hallux IPJ    Digital hair growth is absent  Skin is of diminished texture and decreased turgor. Neurological Examination:  Sharp/dull is present to right and is present to left. Parasthesias absent. Musculoskeletal Examination:  Muscle Strength is 5/5. Bunions Deformity present  bilateral.  Hammer digit deformity present digits 2-5  bilateral.  Pain on palpation to nails. LABS & IMAGING:     Lab Results   Component Value Date     (H) 08/22/2023    BUN 20 (H) 08/22/2023    CREATSERUM 1.28 (H) 08/22/2023    BUNCREA 15.6 08/22/2023    ANIONGAP 6 08/22/2023    GFRAA 44 (L) 07/19/2022    GFRNAA 38 (L) 07/19/2022    CA 9.2 08/22/2023     08/22/2023    K 4.2 08/22/2023     08/22/2023    CO2 26.0 08/22/2023    OSMOCALC 297 (H) 08/22/2023        Lab Results   Component Value Date    A1C 5.7 10/19/2017        No results found. ASSESSMENT AND PLAN:   Diagnoses and all orders for this visit:    Onychomycosis    Toe pain, bilateral    Foot callus          Plan:     Evaluated the patient. Discussed treatment options with the patient. Discussed with patient proper care and hygiene for their feet. Patient tolerated procedures well, without incident. Instrumentation used includes nail nippers and electric  where appropriate. Procedure: (73757 Debridement of toenails 6-10) Surgically debrided and mechanically reduced 6 or more toenails. Hemmorhage occurred none. Nails that were debrided appeared dystrophic and caused the patient pain in shoe gear. Nails 5 Left & 5 Right. Evaluated patient. Discussed treatment options with patient.   Discussed proper hygiene and care for feet as well as use of emollient creams (ie Urea based creams)  Answered all patient questions. Discussed offloading hyperkeratotic lesions with proper shoe gear, offloading pads, and insoles. Procedures: (CPT 18 Paring or cutting of benign hyperkeratotic lesion 2-4)  2 lesions pared utilizing #15 blade to b/l foot without incident. RTC 3 months for nail care    No follow-ups on file.     Елена Frances DPM  9/22/23

## 2023-09-30 ENCOUNTER — HOSPITAL ENCOUNTER (OUTPATIENT)
Dept: CT IMAGING | Facility: HOSPITAL | Age: 78
Discharge: HOME | End: 2023-09-30
Attending: STUDENT IN AN ORGANIZED HEALTH CARE EDUCATION/TRAINING PROGRAM
Payer: MEDICARE

## 2023-09-30 DIAGNOSIS — R10.30 LOWER ABDOMINAL PAIN: ICD-10-CM

## 2023-09-30 DIAGNOSIS — K62.5 RECTAL BLEEDING: ICD-10-CM

## 2023-09-30 LAB
CREAT BLD-MCNC: 1.5 MG/DL
EGFRCR SERPLBLD CKD-EPI 2021: 35 ML/MIN/1.73M2 (ref 60–?)

## 2023-09-30 PROCEDURE — 74177 CT ABD & PELVIS W/CONTRAST: CPT | Performed by: STUDENT IN AN ORGANIZED HEALTH CARE EDUCATION/TRAINING PROGRAM

## 2023-09-30 PROCEDURE — 82565 ASSAY OF CREATININE: CPT

## 2023-10-19 ENCOUNTER — OFFICE VISIT (OUTPATIENT)
Dept: DERMATOLOGY CLINIC | Facility: CLINIC | Age: 78
End: 2023-10-19

## 2023-10-19 DIAGNOSIS — Z86.018 HISTORY OF DYSPLASTIC NEVUS: ICD-10-CM

## 2023-10-19 DIAGNOSIS — L81.4 LENTIGO: ICD-10-CM

## 2023-10-19 DIAGNOSIS — D22.9 MULTIPLE NEVI: Primary | ICD-10-CM

## 2023-10-19 DIAGNOSIS — L82.1 SEBORRHEIC KERATOSES: ICD-10-CM

## 2023-10-19 DIAGNOSIS — D23.9 BENIGN NEOPLASM OF SKIN, UNSPECIFIED LOCATION: ICD-10-CM

## 2023-10-19 DIAGNOSIS — C69.90 OCULAR MELANOMA, UNSPECIFIED LATERALITY (HCC): ICD-10-CM

## 2023-10-19 DIAGNOSIS — L57.0 AK (ACTINIC KERATOSIS): ICD-10-CM

## 2023-10-19 DIAGNOSIS — B37.2 INTERTRIGINOUS CANDIDIASIS: ICD-10-CM

## 2023-10-19 PROCEDURE — 99213 OFFICE O/P EST LOW 20 MIN: CPT | Performed by: DERMATOLOGY

## 2023-10-27 ENCOUNTER — TELEPHONE (OUTPATIENT)
Dept: FAMILY MEDICINE CLINIC | Facility: CLINIC | Age: 78
End: 2023-10-27

## 2023-10-27 NOTE — TELEPHONE ENCOUNTER
RN called patient. Patient's date of birth and full name both confirmed. Informed her per protocol - she can received pfizer booster even if patient had another type of initial series or past booster. She verbalizes understanding of all information, and agreeable to plan to proceed with [employer] [facility] vaccine.

## 2023-10-27 NOTE — TELEPHONE ENCOUNTER
Patient lives a [facility] and asking if its ok to get the newest Covid vaccine. Patients concerns that she has had all Moderna vaccines and this shot is Pfizer, and wants to know if this is ok. Please call at [phone],thanks. *Only will be there until 11:30, but can also go to [facility] to have.

## 2023-10-30 NOTE — PROGRESS NOTES
Teo Vance is a 77year old female. HPI:     CC:  Patient presents with:  Full Skin Exam: LOV 23. Patient with Hx of melanoma, presents for Full Body Skin exam. Denies any concerns at this time. Allergies:  Adhesive Tape    HISTORY:    Past Medical History:   Diagnosis Date    Allergic rhinitis     Anxiety state, unspecified     Carotid artery disease ([facility] 2010    Cataracts, both eyes     OU    Depression     Kidney disease, chronic, stage III (GFR 30-59 ml/min) ([facility])     Melanoma, choroid, right eye ([facility] 2010    OD, Radiation plaque for 7 days; per NG    Other and unspecified hyperlipidemia     Retinal scar of right eye     \"Retinal scarring due to melanoma/ radiation\"    Unspecified essential hypertension       Past Surgical History:   Procedure Laterality Date     [address]    OTHER SURGICAL HISTORY      Melanoma of Choroid: Radiation plaque for 7 days; treated      Family History   Problem Relation Age of Onset    Heart Attack Father 47        MI; per NG    Stroke Mother 66        per NG    Breast Cancer Mother 68        per NG    Heart Disorder Mother         CHF; per NG    Hypertension Mother         per NG    Stroke Maternal Grandmother 71        Cause of death; per NG    Diabetes Other         Aunt; per NG    Lipids Other         Family H/o Hyperlipidemia; per NG    Hypertension Maternal Uncle         per NG    Breast Cancer Paternal Aunt 79    Glaucoma Neg     Macular degeneration Neg       Social History     Socioeconomic History    Marital status:     Tobacco Use    Smoking status: Former     Packs/day: 0.50     Years: 14.00     Additional pack years: 0.00     Total pack years: 7.00     Types: Cigarettes     Quit date: 1979     Years since quittin.8    Smokeless tobacco: Never   Vaping Use    Vaping Use: Never used   Substance and Sexual Activity    Alcohol use: No     Alcohol/week: 0.0 standard drinks of alcohol    Drug use: No    Sexual activity: Not Currently     Partners: Male   Other Topics Concern    Caffeine Concern Yes     Comment: Coffee, 2 cups; per NG    Pt has a pacemaker No    Pt has a defibrillator No    Reaction to local anesthetic No        Current Outpatient Medications   Medication Sig Dispense Refill    citalopram 10 MG Oral Tab Take 1 tablet (10 mg total) by mouth once daily. 90 tablet 1    triamterene-hydroCHLOROthiazide (DYAZIDE) 37.5-25 MG Oral Cap 1capsule 1 times a week 12 capsule 3    pravastatin 40 MG Oral Tab Take 1 tablet (40 mg total) by mouth nightly. 90 tablet 3    amLODIPine 2.5 MG Oral Tab Take 1 tablet (2.5 mg total) by mouth daily. 90 tablet 3    metoprolol succinate ER 25 MG Oral Tablet 24 Hr Take 1 tablet (25 mg total) by mouth daily. 90 tablet 3    aspirin 325 MG Oral Tab Take 0.5 tablets (162.5 mg total) by mouth daily. 90 tablet 1    Calcium-Phosphorus-Vitamin D (CITRACAL +D3 OR) Take 1 tablet by mouth daily. Vitamin B-12 (VITAMIN B12) 1000 MCG Oral Tab Take 1 tablet (1,000 mcg total) by mouth daily. loratadine (CLARITIN) 10 MG Oral Tab Take 1 tablet (10 mg total) by mouth daily. folic acid (FOLVITE) 638 MCG Oral Tab Take 1 tablet (400 mcg total) by mouth daily. Acetaminophen (TYLENOL EXTRA STRENGTH OR) Take  by mouth. Multiple Vitamins-Minerals (CENTRUM SILVER) Oral Tab Take 1 tablet by mouth daily.          Allergies:     Adhesive Tape           RASH    Comment:Pulled the hair off    Past Medical History:   Diagnosis Date    Allergic rhinitis     Anxiety state, unspecified     Carotid artery disease ([address] 2010    Cataracts, both eyes 2013    OU    Depression     Kidney disease, chronic, stage III (GFR 30-59 ml/min) ([facility])     Melanoma, choroid, right eye ([address] 2010    OD, Radiation plaque for 7 days; per NG    Other and unspecified hyperlipidemia     Retinal scar of right eye     \"Retinal scarring due to melanoma/ radiation\"    Unspecified essential hypertension      Past Surgical History:   Procedure Laterality Date    CHOLECYSTECTOMY      COLONOSCOPY      HYSTERECTOMY  1992    OTHER SURGICAL HISTORY      Melanoma of Choroid: Radiation plaque for 7 days; treated     Social History    Socioeconomic History      Marital status:        Spouse name: Not on file      Number of children: Not on file      Years of education: Not on file      Highest education level: Not on file    Occupational History      Not on file    Tobacco Use      Smoking status: Former        Packs/day: 0.50        Years: 14.00        Additional pack years: 0.00        Total pack years: 7.00        Types: Cigarettes        Quit date: 1979        Years since quittin.8      Smokeless tobacco: Never    Vaping Use      Vaping Use: Never used    Substance and Sexual Activity      Alcohol use: No        Alcohol/week: 0.0 standard drinks of alcohol      Drug use: No      Sexual activity: Not Currently        Partners: Male    Other Topics      Concerns:         Service: Not Asked        Blood Transfusions: Not Asked        Caffeine Concern: Yes          Coffee, 2 cups; per NG        Occupational Exposure: Not Asked        Hobby Hazards: Not Asked        Sleep Concern: Not Asked        Stress Concern: Not Asked        Weight Concern: Not Asked        Special Diet: Not Asked        Back Care: Not Asked        Exercise: Not Asked        Bike Helmet: Not Asked        Seat Belt: Not Asked        Self-Exams: Not Asked        Grew up on a farm: Not Asked        History of tanning: Not Asked        Outdoor occupation: Not Asked        Pt has a pacemaker: No        Pt has a defibrillator: No        Breast feeding: Not Asked        Reaction to local anesthetic: No    Social History Narrative      Not on file    Social Determinants of Health  Financial Resource Strain: Not on file  Food Insecurity: Not on file  Transportation Needs: Not on file  Physical Activity: Not on file  Stress: Not on file  Social Connections: Not on file  Housing Stability: Not on file  Family History   Problem Relation Age of Onset    Heart Attack Father 47        MI; per NG    Stroke Mother 66        per NG    Breast Cancer Mother 68        per NG    Heart Disorder Mother         CHF; per NG    Hypertension Mother         per NG    Stroke Maternal Grandmother 71        Cause of death; per NG    Diabetes Other         Aunt; per NG    Lipids Other         Family H/o Hyperlipidemia; per NG    Hypertension Maternal Uncle         per NG    Breast Cancer Paternal Aunt 79    Glaucoma Neg     Macular degeneration Neg        There were no vitals filed for this visit. HPI:    Patient presents with:  Full Skin Exam: LOV 4/20/23. Patient with Hx of melanoma, presents for Full Body Skin exam. Denies any concerns at this time. Here for follow-up history of dysplastic nevi AK's, ocular melanoma no particular concerns. Has been active in choir again. Has not noted any particular changing lesions. 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 No particular lesions of concern    Her melanoma has been stable. Nothing changing    Concerned as renal disease worsening stage III presently. Able to participate in more activities at [facility]    Patient attributes health decline to stress= her  remains at [facility]    Past notes/ records and appropriate/relevant lab results including pathology and past body maps reviewed. Updated and new information noted in current visit. Patient presents with concerns above. Overall doing well, stressed. Patient has been in their usual state of health. History, medications, allergies reviewed as noted. ROS:  Denies any other systemic complaints.   No new or changeing lesions other than noted above. No fevers, chills, night sweats, unusual sun sensitivity. No other skin complaints. History, medications, allergies reviewed as noted. Physical Examination:     Well-developed well-nourished patient alert oriented in no acute distress. Exam total-body performed, including scalp, head, neck, face,nails, hair, external eyes, including conjunctival mucosa, eyelids, lips external ears, back, chest,/ breasts, axillae,  abdomen, arms, legs, palms. Multiple light to medium brown, well marginated, uniformly pigmented, macules and papules 6 mm and less scattered on exam. pigmented lesions examined with dermoscopy benign-appearing patterns. Waxy tannish keratotic papules scattered, cherry-red vascular papules scattered. See map today's date for lesions noted . Otherwise remarkable for lesions as noted on map. See details of examination  See Assessment /Plan for additional history and physical exam also:    Assessment / plan:    No orders of the defined types were placed in this encounter. Meds & Refills for this Visit:  Requested Prescriptions      No prescriptions requested or ordered in this encounter         Multiple nevi  (primary encounter diagnosis)  Seborrheic keratoses  Lentigo  Benign neoplasm of skin, unspecified location  History of dysplastic nevus  Ak (actinic keratosis)  Ocular melanoma, unspecified laterality (hcc)  Intertriginous candidiasis    See details on map. Remarkable for:     Larger lentigo at left lateral upper arm monitor carefully no atypical pattern on dermoscopy  Recheck at follow-up in 6 months    left breast  Compound lentiginous nevus. 10/22 no recurrence    Extensive nevi history of dysplastic nevi nevi. No new suspicious pigmented lesions. No significant changes in other  skin lesions    Previous waxy tan keratotic of the right flank stable. Extensive benign keratoses noted. Increasing number of lesions. Asymptomatic. Observation. No treatment at this time. History of omphalitis, intertrigo stable intertrigo has been less problematic recently. Continues to follow for her ocular melanoma   no new suspicious lesions      Umbilical inflammation in the past resolved is remained clear. Intertrigo stable. Discussed topical and oral medications. fluconazole again if this flares up. If omphalitis flares resume mupirocin ketoconazole regularly. stable nevus nevi stable overall no significant changes  Macule 5 mm with darker brown central pigmentation terminal hairs at left anterior thigh observe. Unchanged stable no other new suspicious lesions      History of ocular melanoma stable continues to follow-up at [facility] annually overall has been stable    Continue regular follow-up no active AK's. History of AK's continue careful sun protection stable     History of dysplastic nevi. No recurrence of atypical nevi. No new suspicious lesions. Continue to observe papular lesion at right medial cheek. no change    Extensive benign keratoses reassurance recheck 6 months    Please refer to map for specific lesions. See additional diagnoses. Pros cons of various therapies, risks benefits discussed. Pathophysiology discussed with patient. Therapeutic options reviewed. See  Medications in grid. Instructions reviewed at length. Benign nevi, seborrheic  keratoses, cherry angiomas:  Reassurance regarding other benign skin lesions. Signs and symptoms of skin cancer, ABCDE's of melanoma discussed with patient. Sunscreen use, sun protection, self exams reviewed. Followup as noted RTC routine checkup 4-6 mos  or p.r.n.   Encounter Times  Including precharting, reviewing chart, prior notes obtaining history: 5 minutes, medical exam :10 minutes, notes on body map, plan, counseling 10minutes My total time spent caring for the patient on the day of the encounter: 25 minutes    The patient indicates understanding of these issues and agrees to the plan. The patient is asked to return as noted in follow-up/ above. This note was generated using Dragon voice recognition software. Please contact me regarding any confusion resulting from errors in recognition.

## 2023-11-15 ENCOUNTER — LAB ENCOUNTER (OUTPATIENT)
Dept: LAB | Age: 78
End: 2023-11-15
Attending: STUDENT IN AN ORGANIZED HEALTH CARE EDUCATION/TRAINING PROGRAM
Payer: MEDICARE

## 2023-11-15 ENCOUNTER — OFFICE VISIT (OUTPATIENT)
Dept: FAMILY MEDICINE CLINIC | Facility: CLINIC | Age: 78
End: 2023-11-15
Payer: MEDICARE

## 2023-11-15 VITALS
HEIGHT: 63 IN | HEART RATE: 67 BPM | DIASTOLIC BLOOD PRESSURE: 67 MMHG | BODY MASS INDEX: 32.99 KG/M2 | OXYGEN SATURATION: 97 % | SYSTOLIC BLOOD PRESSURE: 146 MMHG | TEMPERATURE: 98 F | WEIGHT: 186.19 LBS

## 2023-11-15 DIAGNOSIS — I77.9 DISORDER OF ARTERIES AND ARTERIOLES (HCC): ICD-10-CM

## 2023-11-15 DIAGNOSIS — C69.91 OCULAR MELANOMA, RIGHT (HCC): ICD-10-CM

## 2023-11-15 DIAGNOSIS — E78.5 HYPERLIPIDEMIA, UNSPECIFIED HYPERLIPIDEMIA TYPE: ICD-10-CM

## 2023-11-15 DIAGNOSIS — I10 ESSENTIAL HYPERTENSION: ICD-10-CM

## 2023-11-15 DIAGNOSIS — G63 POLYNEUROPATHY IN OTHER DISEASES CLASSIFIED ELSEWHERE (HCC): ICD-10-CM

## 2023-11-15 DIAGNOSIS — Z00.00 ENCOUNTER FOR ANNUAL HEALTH EXAMINATION: Primary | ICD-10-CM

## 2023-11-15 DIAGNOSIS — Z98.890 H/O CAROTID ENDARTERECTOMY: ICD-10-CM

## 2023-11-15 DIAGNOSIS — N18.31 STAGE 3A CHRONIC KIDNEY DISEASE (HCC): ICD-10-CM

## 2023-11-15 DIAGNOSIS — F32.0 MAJOR DEPRESSIVE DISORDER, SINGLE EPISODE, MILD (HCC): ICD-10-CM

## 2023-11-15 DIAGNOSIS — Z12.31 ENCOUNTER FOR SCREENING MAMMOGRAM FOR MALIGNANT NEOPLASM OF BREAST: ICD-10-CM

## 2023-11-15 LAB
ALBUMIN SERPL-MCNC: 4.7 G/DL (ref 3.2–4.8)
ALBUMIN/GLOB SERPL: 1.4 {RATIO} (ref 1–2)
ALP LIVER SERPL-CCNC: 87 U/L
ALT SERPL-CCNC: 16 U/L
ANION GAP SERPL CALC-SCNC: 8 MMOL/L (ref 0–18)
AST SERPL-CCNC: 35 U/L (ref ?–34)
BILIRUB SERPL-MCNC: 0.5 MG/DL (ref 0.2–1.1)
BILIRUB UR QL: NEGATIVE
BUN BLD-MCNC: 29 MG/DL (ref 9–23)
BUN/CREAT SERPL: 19.9 (ref 10–20)
CALCIUM BLD-MCNC: 9.6 MG/DL (ref 8.7–10.4)
CHLORIDE SERPL-SCNC: 102 MMOL/L (ref 98–112)
CHOLEST SERPL-MCNC: 199 MG/DL (ref ?–200)
CLARITY UR: CLEAR
CO2 SERPL-SCNC: 28 MMOL/L (ref 21–32)
CREAT BLD-MCNC: 1.46 MG/DL
EGFRCR SERPLBLD CKD-EPI 2021: 36 ML/MIN/1.73M2 (ref 60–?)
FASTING PATIENT LIPID ANSWER: YES
FASTING STATUS PATIENT QL REPORTED: YES
GLOBULIN PLAS-MCNC: 3.4 G/DL (ref 2.8–4.4)
GLUCOSE BLD-MCNC: 97 MG/DL (ref 70–99)
GLUCOSE UR-MCNC: NORMAL MG/DL
HDLC SERPL-MCNC: 52 MG/DL (ref 40–59)
HGB UR QL STRIP.AUTO: NEGATIVE
KETONES UR-MCNC: NEGATIVE MG/DL
LDLC SERPL CALC-MCNC: 119 MG/DL (ref ?–100)
LEUKOCYTE ESTERASE UR QL STRIP.AUTO: NEGATIVE
NITRITE UR QL STRIP.AUTO: NEGATIVE
NONHDLC SERPL-MCNC: 147 MG/DL (ref ?–130)
OSMOLALITY SERPL CALC.SUM OF ELEC: 292 MOSM/KG (ref 275–295)
PH UR: 6 [PH] (ref 5–8)
POTASSIUM SERPL-SCNC: 3.6 MMOL/L (ref 3.5–5.1)
PROT SERPL-MCNC: 8.1 G/DL (ref 5.7–8.2)
PROT UR-MCNC: NEGATIVE MG/DL
SODIUM SERPL-SCNC: 138 MMOL/L (ref 136–145)
SP GR UR STRIP: 1.01 (ref 1–1.03)
TRIGL SERPL-MCNC: 156 MG/DL (ref 30–149)
TSI SER-ACNC: 0.84 MIU/ML (ref 0.55–4.78)
UROBILINOGEN UR STRIP-ACNC: NORMAL
VLDLC SERPL CALC-MCNC: 27 MG/DL (ref 0–30)

## 2023-11-15 PROCEDURE — 36415 COLL VENOUS BLD VENIPUNCTURE: CPT

## 2023-11-15 PROCEDURE — 80053 COMPREHEN METABOLIC PANEL: CPT

## 2023-11-15 PROCEDURE — 81003 URINALYSIS AUTO W/O SCOPE: CPT

## 2023-11-15 PROCEDURE — 84443 ASSAY THYROID STIM HORMONE: CPT

## 2023-11-15 PROCEDURE — 80061 LIPID PANEL: CPT

## 2023-11-15 PROCEDURE — 1126F AMNT PAIN NOTED NONE PRSNT: CPT | Performed by: STUDENT IN AN ORGANIZED HEALTH CARE EDUCATION/TRAINING PROGRAM

## 2023-11-15 PROCEDURE — 99497 ADVNCD CARE PLAN 30 MIN: CPT | Performed by: STUDENT IN AN ORGANIZED HEALTH CARE EDUCATION/TRAINING PROGRAM

## 2023-11-15 PROCEDURE — G0439 PPPS, SUBSEQ VISIT: HCPCS | Performed by: STUDENT IN AN ORGANIZED HEALTH CARE EDUCATION/TRAINING PROGRAM

## 2023-12-12 ENCOUNTER — NURSE TRIAGE (OUTPATIENT)
Dept: FAMILY MEDICINE CLINIC | Facility: CLINIC | Age: 78
End: 2023-12-12

## 2023-12-12 DIAGNOSIS — R05.1 ACUTE COUGH: Primary | ICD-10-CM

## 2023-12-12 NOTE — TELEPHONE ENCOUNTER
Pt contacted. Verified name and . Pt informed of medication sent to the pharmacy. Pt to follow-up if not better.

## 2023-12-12 NOTE — TELEPHONE ENCOUNTER
Symptoms are viral and patient can take coricidin HBP over the counter  Will forward to Dr. Emil Vance for further recommendations when she returns

## 2023-12-12 NOTE — TELEPHONE ENCOUNTER
Action Requested: Summary for Provider     []  Critical Lab, Recommendations Needed  [x] Need Additional Advice  []   FYI    []   Need Orders  [x] Need Medications Sent to Pharmacy  []  Other     SUMMARY: Appointment advised. Patient declined, stating she cannot drive and does not want to expose anyone and does not know how to do a video visit. Asking for medication to be sent in to [address] before 3 today so daughter can pick it up for her. Preferred pharmacy:  [address] [place] [address] [id number] - [place] Guard, [address] [phone], [phone]     Reason for call: Cough  Onset:      Patient calling (identified name and ) states she received the RSV vaccine on . Started having a fever of 101 on 10/9 with chills at night. On 12/10, temp 99 and still had chills overnight and started developing a cough. Last night, cough became worse with nasal congestion, coughing up light brown phlegm, again had fever of 101 with chills and headache. Taking Tylenol, increasing fluid intake. Denies SOB, chest pains, dizziness. Advised to go to the IC/ED with any worsening symptoms, chest pains, difficulty breathing, dizziness. Patient verbalized understanding and agrees with plan.            Reason for Disposition   Fever > 101 F (38.3 C) and over 60years of age    Protocols used: Cough-A-OH

## 2023-12-18 NOTE — TELEPHONE ENCOUNTER
Patient still has a cough. Appointment made. Future Appointments   Date Time Provider Cassie Mayoi   12/19/2023  3:00 PM WENDY Davis [facility]   1/5/2024  9:40 AM Pati Handy, YEFRIM [facility]   2/28/2024 10:00 AM Lambert Mares MD [id number]   4/25/2024 11:00 AM Ellyn Wolff MD [facility] [place]   5/15/2024 10:30 AM Jitendra Hurtado MD [facility]   7/3/2024 10:00 AM Bogdan Melendez MD [address] [facility]   8/20/2024 10:00 AM Erin Bashir MD [address] [facility]           Medication Quantity Refills Start End   guaiFENesin 100 MG/5 ML Oral 180 mL 0 12/12/2023 --   Sig:   Take 5 mL (100 mg total) by mouth every 4 (four) hours as needed.      Route:   Oral     Order #:   [id number]

## 2023-12-19 ENCOUNTER — OFFICE VISIT (OUTPATIENT)
Dept: FAMILY MEDICINE CLINIC | Facility: CLINIC | Age: 78
End: 2023-12-19
Payer: MEDICARE

## 2023-12-19 VITALS
BODY MASS INDEX: 32.07 KG/M2 | HEART RATE: 77 BPM | OXYGEN SATURATION: 97 % | SYSTOLIC BLOOD PRESSURE: 131 MMHG | HEIGHT: 63 IN | RESPIRATION RATE: 21 BRPM | WEIGHT: 181 LBS | TEMPERATURE: 98 F | DIASTOLIC BLOOD PRESSURE: 69 MMHG

## 2023-12-19 DIAGNOSIS — R05.1 ACUTE COUGH: ICD-10-CM

## 2023-12-19 DIAGNOSIS — Z20.822 SUSPECTED COVID-19 VIRUS INFECTION: Primary | ICD-10-CM

## 2023-12-19 LAB
COVID19 BINAX NOW ANTIGEN: NOT DETECTED
OPERATOR ID: NORMAL

## 2023-12-19 PROCEDURE — 99213 OFFICE O/P EST LOW 20 MIN: CPT

## 2023-12-19 RX ORDER — ALBUTEROL SULFATE 90 UG/1
2 AEROSOL, METERED RESPIRATORY (INHALATION) EVERY 4 HOURS PRN
Qty: 18 G | Refills: 0 | Status: SHIPPED | OUTPATIENT
Start: 2023-12-19

## 2023-12-19 RX ORDER — FLUTICASONE PROPIONATE 110 UG/1
2 AEROSOL, METERED RESPIRATORY (INHALATION) 2 TIMES DAILY
Qty: 12 G | Refills: 0 | Status: SHIPPED | OUTPATIENT
Start: 2023-12-19 | End: 2024-01-02

## 2023-12-21 ENCOUNTER — TELEPHONE (OUTPATIENT)
Dept: FAMILY MEDICINE CLINIC | Facility: CLINIC | Age: 78
End: 2023-12-21

## 2023-12-21 NOTE — TELEPHONE ENCOUNTER
Patient contacts clinic regarding inhaler and cough syrup use. States her symptoms are improved and cough is resolving. She inquires if she has to use medications prescribed. RN advised patient that these medications were prescribed for her acute respiratory issue and cough, if her symptoms are improving she does not need to use the medications. She verbalized understanding.

## 2024-01-05 ENCOUNTER — OFFICE VISIT (OUTPATIENT)
Dept: PODIATRY CLINIC | Facility: CLINIC | Age: 79
End: 2024-01-05
Payer: MEDICARE

## 2024-01-05 DIAGNOSIS — B35.1 ONYCHOMYCOSIS: Primary | ICD-10-CM

## 2024-01-05 DIAGNOSIS — M79.674 TOE PAIN, BILATERAL: ICD-10-CM

## 2024-01-05 DIAGNOSIS — L84 FOOT CALLUS: ICD-10-CM

## 2024-01-05 DIAGNOSIS — M79.675 TOE PAIN, BILATERAL: ICD-10-CM

## 2024-01-05 PROCEDURE — 99213 OFFICE O/P EST LOW 20 MIN: CPT | Performed by: PODIATRIST

## 2024-01-05 NOTE — PROGRESS NOTES
[facility]  Progress Note    Valery Casillas is a 78 year old female.   Chief Complaint   Patient presents with    Toenail Care     F/u Toenail care- Elderly patient unable to trim toenails due to medical condition. Pain 1/10.         HPI:     This is a pleasant female with HTN and PMH of carotid endarterectomy, CKD stage 3.      She presents to clinic today for nail care.  She complains of long thick toenails which she is unable to trim herself.  She also has calluses on the bottom of her great toes.          Allergies: Adhesive tape   Current Outpatient Medications   Medication Sig Dispense Refill    albuterol 108 (90 Base) MCG/ACT Inhalation Aero Soln Inhale 2 puffs into the lungs every 4 (four) hours as needed. 18 g 0    Spacer/Aero-Holding Chambers Does not apply Device Use with albuterol inhaler as needed 1 each 0    guaiFENesin 100 MG/5 ML Oral Take 5 mL (100 mg total) by mouth every 4 (four) hours as needed. 180 mL 0    citalopram 10 MG Oral Tab Take 1 tablet (10 mg total) by mouth once daily. 90 tablet 1    triamterene-hydroCHLOROthiazide (DYAZIDE) 37.5-25 MG Oral Cap 1capsule 1 times a week 12 capsule 3    pravastatin 40 MG Oral Tab Take 1 tablet (40 mg total) by mouth nightly. 90 tablet 3    amLODIPine 2.5 MG Oral Tab Take 1 tablet (2.5 mg total) by mouth daily. 90 tablet 3    metoprolol succinate ER 25 MG Oral Tablet 24 Hr Take 1 tablet (25 mg total) by mouth daily. 90 tablet 3    aspirin 325 MG Oral Tab Take 0.5 tablets (162.5 mg total) by mouth daily. 90 tablet 1    Calcium-Phosphorus-Vitamin D (CITRACAL +D3 OR) Take 1 tablet by mouth daily.      Vitamin B-12 (VITAMIN B12) 1000 MCG Oral Tab Take 1 tablet (1,000 mcg total) by mouth daily.      loratadine (CLARITIN) 10 MG Oral Tab Take 1 tablet (10 mg total) by mouth daily.      folic acid (FOLVITE) 400 MCG Oral Tab Take 1 tablet (400 mcg total) by mouth daily.      Acetaminophen (TYLENOL EXTRA STRENGTH OR) Take  by mouth.      Multiple  Vitamins-Minerals (CENTRUM SILVER) Oral Tab Take 1 tablet by mouth daily.          Past Medical History:   Diagnosis Date    Allergic rhinitis     Anxiety state, unspecified     Carotid artery disease ([facility]) 2010    Cataracts, both eyes     OU    Depression     Kidney disease, chronic, stage III (GFR 30-59 ml/min) ([facility])     Melanoma, choroid, right eye ([facility])     OD, Radiation plaque for 7 days; per NG    Other and unspecified hyperlipidemia     Retinal scar of right eye     \"Retinal scarring due to melanoma/ radiation\"    Unspecified essential hypertension       Past Surgical History:   Procedure Laterality Date    CHOLECYSTECTOMY      COLONOSCOPY      HYSTERECTOMY      OTHER SURGICAL HISTORY      Melanoma of Choroid: Radiation plaque for 7 days; treated      Family History   Problem Relation Age of Onset    Heart Attack Father 54        MI; per NG    Stroke Mother 78        per NG    Breast Cancer Mother 76        per NG    Heart Disorder Mother         CHF; per NG    Hypertension Mother         per NG    Stroke Maternal Grandmother 69        Cause of death; per NG    Diabetes Other         Aunt; per NG    Lipids Other         Family H/o Hyperlipidemia; per NG    Hypertension Maternal Uncle         per NG    Breast Cancer Paternal Aunt 70    Glaucoma Neg     Macular degeneration Neg       Social History     Socioeconomic History    Marital status:    Tobacco Use    Smoking status: Former     Packs/day: 0.50     Years: 14.00     Additional pack years: 0.00     Total pack years: 7.00     Types: Cigarettes     Quit date: 1979     Years since quittin.0    Smokeless tobacco: Never   Vaping Use    Vaping Use: Never used   Substance and Sexual Activity    Alcohol use: No     Alcohol/week: 0.0 standard drinks of alcohol    Drug use: No    Sexual activity: Not Currently     Partners: Male   Other Topics Concern    Caffeine Concern Yes     Comment: Coffee, 2 cups; per NG    Pt has a  pacemaker No    Pt has a defibrillator No    Reaction to local anesthetic No           REVIEW OF SYSTEMS:   Denies nausea, fever, chills  No calf pain  No other muscle or joint aches  Denies chest pain or shortness of breath.      EXAM:   LMP  (LMP Unknown)     Constitutional:   Patient in no apparent distress. Well kept. Of normal body habitus. Alert and oriented to person, place, and time.  Vascular Examination:  DP pulse is 2/4  PT pulse is NP  Capillary refill is immediate  Integumentary Examination:   The patient's nails appear incurvated, thickened, elongated, dystrophic, discolored with subungual debris 1-5 right, 1-5  left nails.    Callus to b/l plantar hallux IPJ    Digital hair growth is absent  Skin is of diminished texture and decreased turgor.  Neurological Examination:  Sharp/dull is present to right and is present to left.  Parasthesias absent.  Musculoskeletal Examination:  Muscle Strength is 5/5.  Bunions Deformity present  bilateral.  Hammer digit deformity present digits 2-5  bilateral.  Pain on palpation to nails.          LABS & IMAGING:     Lab Results   Component Value Date    GLU 97 11/15/2023    BUN 29 (H) 11/15/2023    CREATSERUM 1.46 (H) 11/15/2023    BUNCREA 19.9 11/15/2023    ANIONGAP 8 11/15/2023    GFRAA 44 (L) 07/19/2022    GFRNAA 38 (L) 07/19/2022    CA 9.6 11/15/2023     11/15/2023    K 3.6 11/15/2023     11/15/2023    CO2 28.0 11/15/2023    OSMOCALC 292 11/15/2023        Lab Results   Component Value Date    A1C 5.7 10/19/2017        No results found.     ASSESSMENT AND PLAN:   Diagnoses and all orders for this visit:    Onychomycosis    Toe pain, bilateral    Foot callus            Plan:     Evaluated the patient. Discussed treatment options with the patient.  Discussed with patient proper care and hygiene for their feet.  Patient tolerated procedures well, without incident.    Instrumentation used includes nail nippers and electric  where  appropriate.  Procedure: (96363 Debridement of toenails 6-10) Surgically debrided and mechanically reduced 6 or more toenails.Hemmorhage occurred none.Nails that were debrided appeared dystrophic and caused the patient pain in shoe gear.Nails 5 Left & 5 Right.      Evaluated patient. Discussed treatment options with patient.  Discussed proper hygiene and care for feet as well as use of emollient creams (ie Urea based creams)  Answered all patient questions. Discussed offloading hyperkeratotic lesions with proper shoe gear, offloading pads, and insoles.    Procedures: (CPT 83544 Paring or cutting of benign hyperkeratotic lesion 2-4)  2 lesions pared utilizing #15 blade to b/l foot without incident.      RTC 3 months for nail care    No follow-ups on file.    Kwasi Handy DPM  1/5/24

## 2024-02-03 DIAGNOSIS — F32.A MILD DEPRESSION: ICD-10-CM

## 2024-02-03 DIAGNOSIS — E78.5 HYPERLIPIDEMIA, UNSPECIFIED HYPERLIPIDEMIA TYPE: ICD-10-CM

## 2024-02-03 DIAGNOSIS — I10 ESSENTIAL HYPERTENSION: ICD-10-CM

## 2024-02-03 RX ORDER — PRAVASTATIN SODIUM 40 MG
40 TABLET ORAL NIGHTLY
Qty: 90 TABLET | Refills: 3 | Status: SHIPPED | OUTPATIENT
Start: 2024-02-03

## 2024-02-03 RX ORDER — CITALOPRAM HYDROBROMIDE 10 MG/1
10 TABLET ORAL
Qty: 90 TABLET | Refills: 3 | Status: SHIPPED | OUTPATIENT
Start: 2024-02-03

## 2024-02-03 NOTE — TELEPHONE ENCOUNTER
Dr. Tomas - the following failed protocol, please advise:   - Amlodipine  - Metoprolol succinate,   - triamterene-hydrochlorothiazide     Pended.         Citalopram and pravastatin Refill passed per [facility] protocol.    Requested Prescriptions   Pending Prescriptions Disp Refills    citalopram 10 MG Oral Tab 90 tablet 1     Sig: Take 1 tablet (10 mg total) by mouth once daily.       Psychiatric Non-Scheduled (Anti-Anxiety) Passed - 2/3/2024  1:13 PM        Passed - In person appointment or virtual visit in the past 6 mos or appointment in next 3 mos     Recent Outpatient Visits              4 weeks ago Onychomycosis    [facility] Meshulam, Kwasi Carlos, DPM    Office Visit    1 month ago Suspected COVID-19 virus infection    [facility] Jelly Kohler APRN    Office Visit    2 months ago Encounter for annual health examination    [facility] Cornelia Tomas MD    Office Visit    3 months ago Multiple nevi    [facility], [place] Miesha Coronado MD    Office Visit    4 months ago Onychomycosis    [facility] Meshulam, Kwasi Carlos, DPM    Office Visit          Future Appointments         Provider Department Appt Notes    In 1 month Godwin Campa MD [facility] 6 mos (policy informed)    In 2 months Meshulaherbie, Kwasi Carlos, DPM [facility] NAIL CARE 3 MOS F/U    In 2 months Miesha Coronado MD [facility] [address], [place] 6 Month F/u    In 3 months Cornelia Tomas MD [facility] 6 month follow up    In 5 months Vel Jc MD [facility] EP/ Dilated exam.    In 6 months King Jacobs MD [facility]  [facility]                  amLODIPine 2.5 MG Oral Tab 90 tablet 3     Sig: Take 1 tablet (2.5 mg total) by mouth daily.       Hypertensive Medications Protocol Failed - 2/3/2024  1:13 PM        Failed - EGFRCR or GFRNAA > 50     GFR Evaluation  EGFRCR: 36 , resulted on 11/15/2023          Passed - In person appointment in the past 12 or next 3 months     Recent Outpatient Visits              4 weeks ago Onychomycosis    [facility] Kwasi Handy, DPM    Office Visit    1 month ago Suspected COVID-19 virus infection    [facility] Jelly Kohler APRN    Office Visit    2 months ago Encounter for annual health examination    [facility] Cornelia Tomas MD    Office Visit    3 months ago Multiple nevi    [facility] [address], [place] Miesha Coronado MD    Office Visit    4 months ago Onychomycosis    [facility] Meshadeline, Kwasi Carlos, DPM    Office Visit          Future Appointments         Provider Department Appt Notes    In 1 month Godwin Campa MD [facility] 6 mos (policy informed)    In 2 months Kwasi Handy, DPM [facility] NAIL CARE 3 MOS F/U    In 2 months Miesha Coronado MD [facility] [address], [place] 6 Month F/u    In 3 months Cornelia Tomas MD [facility] 6 month follow up    In 5 months Vel Jc MD [facility] EP/ Dilated exam.    In 6 months King Jacobs MD [facility]                Passed - Last BP reading less than 140/90     BP Readings from Last 1 Encounters:   12/19/23 131/69               Passed - CMP or BMP  in past 6 months     Recent Results (from the past 4392 hour(s))   CMP    Collection Time: 11/15/23 10:04 AM   Result Value Ref Range    Glucose 97 70 - 99 mg/dL    Sodium 138 136 - 145 mmol/L    Potassium 3.6 3.5 - 5.1 mmol/L    Chloride 102 98 - 112 mmol/L    CO2 28.0 21.0 - 32.0 mmol/L    Anion Gap 8 0 - 18 mmol/L    BUN 29 (H) 9 - 23 mg/dL    Creatinine 1.46 (H) 0.55 - 1.02 mg/dL    BUN/CREA Ratio 19.9 10.0 - 20.0    Calcium, Total 9.6 8.7 - 10.4 mg/dL    Calculated Osmolality 292 275 - 295 mOsm/kg    eGFR-Cr 36 (L) >=60 mL/min/1.73m2    ALT 16 10 - 49 U/L    AST 35 (H) <=34 U/L    Alkaline Phosphatase 87 55 - 142 U/L    Bilirubin, Total 0.5 0.2 - 1.1 mg/dL    Total Protein 8.1 5.7 - 8.2 g/dL    Albumin 4.7 3.2 - 4.8 g/dL    Globulin  3.4 2.8 - 4.4 g/dL    A/G Ratio 1.4 1.0 - 2.0    Patient Fasting for CMP? Yes      *Note: Due to a large number of results and/or encounters for the requested time period, some results have not been displayed. A complete set of results can be found in Results Review.               Passed - In person appointment or virtual visit in the past 6 months     Recent Outpatient Visits              4 weeks ago Onychomycosis    [facility] CudahyKwasi العلي DPM    Office Visit    1 month ago Suspected COVID-19 virus infection    [facility]    Office Visit    2 months ago Encounter for annual health examination    [facility]    Office Visit    3 months ago Multiple nevi    [facility] [place] Thomas, Kathryn, MD    Office Visit    4 months ago Onychomycosis    [facility] [place] Meshulam, Kwasi Carlos, DPM    Office Visit          Future Appointments         Provider Department Appt Notes    In 1 month Godwin Campa MD [facility],  [facility] 6 mos (policy informed)    In 2 months Meshulam, Kwasi Carlos, DPM [facility] NAIL CARE 3 MOS F/U    In 2 months Miesha Coronado MD [facility] [address], [place] 6 Month F/u    In 3 months Cornelia Tomas MD [facility] 6 month follow up    In 5 months Vel Jc MD [facility] EP/ Dilated exam.    In 6 months King Jacobs MD [facility]                  metoprolol succinate ER 25 MG Oral Tablet 24 Hr 90 tablet 3     Sig: Take 1 tablet (25 mg total) by mouth daily.       Hypertensive Medications Protocol Failed - 2/3/2024  1:13 PM        Failed - EGFRCR or GFRNAA > 50     GFR Evaluation  EGFRCR: 36 , resulted on 11/15/2023          Passed - In person appointment in the past 12 or next 3 months     Recent Outpatient Visits              4 weeks ago Onychomycosis    [facility] Meshulam, Kwasi Carlos, DPM    Office Visit    1 month ago Suspected COVID-19 virus infection    [facility] Jelly Kohler APRN    Office Visit    2 months ago Encounter for annual health examination    [facility] Cornelia Tomas MD    Office Visit    3 months ago Multiple nevi    [facility] [address], [place] Miesha Coronado MD    Office Visit    4 months ago Onychomycosis    [facility] Meshulam, Kwasi Carlos, DPM    Office Visit          Future Appointments         Provider Department Appt Notes    In 1 month Godwin Campa MD [facility] 6 mos (policy informed)    In 2 months Meshulam, Kwasi Carlos, DPM [facility] NAIL CARE 3 MOS F/U     In 2 months Miesha Coronado MD [facility] [address], [place] 6 Month F/u    In 3 months Cornelia Tomas MD [facility] 6 month follow up    In 5 months Vel Jc MD [facility] EP/ Dilated exam.    In 6 months King Jacobs MD [facility]                Passed - Last BP reading less than 140/90     BP Readings from Last 1 Encounters:   12/19/23 131/69               Passed - CMP or BMP in past 6 months     Recent Results (from the past 4392 hour(s))   CMP    Collection Time: 11/15/23 10:04 AM   Result Value Ref Range    Glucose 97 70 - 99 mg/dL    Sodium 138 136 - 145 mmol/L    Potassium 3.6 3.5 - 5.1 mmol/L    Chloride 102 98 - 112 mmol/L    CO2 28.0 21.0 - 32.0 mmol/L    Anion Gap 8 0 - 18 mmol/L    BUN 29 (H) 9 - 23 mg/dL    Creatinine 1.46 (H) 0.55 - 1.02 mg/dL    BUN/CREA Ratio 19.9 10.0 - 20.0    Calcium, Total 9.6 8.7 - 10.4 mg/dL    Calculated Osmolality 292 275 - 295 mOsm/kg    eGFR-Cr 36 (L) >=60 mL/min/1.73m2    ALT 16 10 - 49 U/L    AST 35 (H) <=34 U/L    Alkaline Phosphatase 87 55 - 142 U/L    Bilirubin, Total 0.5 0.2 - 1.1 mg/dL    Total Protein 8.1 5.7 - 8.2 g/dL    Albumin 4.7 3.2 - 4.8 g/dL    Globulin  3.4 2.8 - 4.4 g/dL    A/G Ratio 1.4 1.0 - 2.0    Patient Fasting for CMP? Yes      *Note: Due to a large number of results and/or encounters for the requested time period, some results have not been displayed. A complete set of results can be found in Results Review.               Passed - In person appointment or virtual visit in the past 6 months     Recent Outpatient Visits              4 weeks ago Onychomycosis    [facility] Meshulam, Kwasi Carlos, DPM    Office Visit    1 month ago Suspected COVID-19 virus infection    [facility], [address], [place] Dobson,  APRN    Office Visit    2 months ago Encounter for annual health examination    [facility] Cornelia Tomas MD    Office Visit    3 months ago Multiple nevi    [facility] [address], [place] Miesha Coronado MD    Office Visit    4 months ago Onychomycosis    [facility] Meshulam, Kwasi Carlos, DPM    Office Visit          Future Appointments         Provider Department Appt Notes    In 1 month Godwin Campa MD [facility] 6 mos (policy informed)    In 2 months MeshKwasi lr, DPM [facility] NAIL CARE 3 MOS F/U    In 2 months Miesha Coronado MD [facility] [address], [place] 6 Month F/u    In 3 months Cornelia Tomas MD [facility] 6 month follow up    In 5 months Vel Jc MD [facility] EP/ Dilated exam.    In 6 months King Jacobs MD [facility]                  pravastatin 40 MG Oral Tab 90 tablet 3     Sig: Take 1 tablet (40 mg total) by mouth nightly.       Cholesterol Medication Protocol Passed - 2/3/2024  1:13 PM        Passed - ALT in past 12 months        Passed - LDL in past 12 months        Passed - Last ALT < 80     Lab Results   Component Value Date    ALT 16 11/15/2023             Passed - Last LDL < 130     Lab Results   Component Value Date     (H) 11/15/2023             Passed - In person appointment or virtual visit in the past 12 mos or appointment in next 3 mos     Recent Outpatient Visits              4 weeks ago Onychomycosis    [facility] MeshFox lric Carlos, DPM    Office Visit    1 month ago Suspected COVID-19 virus infection    [facility]  Jelly Leyva APRN    Office Visit    2 months ago Encounter for annual health examination    [facility] Cornelia Tomas MD    Office Visit    3 months ago Multiple nevi    [facility] [address], [place] Miesha Coronado MD    Office Visit    4 months ago Onychomycosis    [facility] Meshulam, Kwasi Carlos, DPM    Office Visit          Future Appointments         Provider Department Appt Notes    In 1 month Godwin Campa MD [facility] 6 mos (policy informed)    In 2 months Meshadeline Kwasiesha Gonzalez, DPM [facility] NAIL CARE 3 MOS F/U    In 2 months Miesha Coronado MD [facility] [address], [place] 6 Month F/u    In 3 months Cornelia Tomas MD [facility] 6 month follow up    In 5 months Vel Jc MD [facility] EP/ Dilated exam.    In 6 months King Jacobs MD [facility]                  triamterene-hydroCHLOROthiazide (DYAZIDE) 37.5-25 MG Oral Cap 12 capsule 3     Sicapsule 1 times a week       Hypertensive Medications Protocol Failed - 2/3/2024  1:13 PM        Failed - EGFRCR or GFRNAA > 50     GFR Evaluation  EGFRCR: 36 , resulted on 11/15/2023          Passed - In person appointment in the past 12 or next 3 months     Recent Outpatient Visits              4 weeks ago Onychomycosis    [facility] Meshulaherbie, Kwasi Carlos, DPM    Office Visit    1 month ago Suspected COVID-19 virus infection    [facility]    Office Visit    2 months ago Encounter for annual health examination    [facility]  Cornelia Tomas MD    Office Visit    3 months ago Multiple nevi    [facility] [address], [place] Miesha Coronado MD    Office Visit    4 months ago Onychomycosis    [facility] Kwasi Handy DPM    Office Visit          Future Appointments         Provider Department Appt Notes    In 1 month Godwin Campa MD [facility] 6 mos (policy informed)    In 2 months Kwasi Handy DPM [facility] NAIL CARE 3 MOS F/U    In 2 months Miesha Coronado MD [facility] [address], [place] 6 Month F/u    In 3 months Cornelia Tomas MD [facility] 6 month follow up    In 5 months Vel Jc MD [facility] EP/ Dilated exam.    In 6 months Knig Jacobs MD [facility]                Passed - Last BP reading less than 140/90     BP Readings from Last 1 Encounters:   12/19/23 131/69               Passed - CMP or BMP in past 6 months     Recent Results (from the past 4392 hour(s))   CMP    Collection Time: 11/15/23 10:04 AM   Result Value Ref Range    Glucose 97 70 - 99 mg/dL    Sodium 138 136 - 145 mmol/L    Potassium 3.6 3.5 - 5.1 mmol/L    Chloride 102 98 - 112 mmol/L    CO2 28.0 21.0 - 32.0 mmol/L    Anion Gap 8 0 - 18 mmol/L    BUN 29 (H) 9 - 23 mg/dL    Creatinine 1.46 (H) 0.55 - 1.02 mg/dL    BUN/CREA Ratio 19.9 10.0 - 20.0    Calcium, Total 9.6 8.7 - 10.4 mg/dL    Calculated Osmolality 292 275 - 295 mOsm/kg    eGFR-Cr 36 (L) >=60 mL/min/1.73m2    ALT 16 10 - 49 U/L    AST 35 (H) <=34 U/L    Alkaline Phosphatase 87 55 - 142 U/L    Bilirubin, Total 0.5 0.2 - 1.1 mg/dL    Total Protein 8.1 5.7 - 8.2 g/dL    Albumin 4.7 3.2 - 4.8 g/dL    Globulin  3.4 2.8 - 4.4 g/dL    A/G Ratio 1.4 1.0 - 2.0    Patient  Fasting for CMP? Yes      *Note: Due to a large number of results and/or encounters for the requested time period, some results have not been displayed. A complete set of results can be found in Results Review.               Passed - In person appointment or virtual visit in the past 6 months     Recent Outpatient Visits              4 weeks ago Onychomycosis    [facility] Meshulam, Kwasi Carlos, DPM    Office Visit    1 month ago Suspected COVID-19 virus infection    [facility] Jelly Kohler APRN    Office Visit    2 months ago Encounter for annual health examination    [facility] Cornelia Tomas MD    Office Visit    3 months ago Multiple nevi    [facility] [address], [place] Miesha Coronado MD    Office Visit    4 months ago Onychomycosis    [facility] [place] Meshulam, Kwasi Carlos, DPM    Office Visit          Future Appointments         Provider Department Appt Notes    In 1 month Godwin Campa MD [facility] 6 mos (policy informed)    In 2 months Meshulam, Kwasi Carlos, DPM [facility] NAIL CARE 3 MOS F/U    In 2 months Miesha Coronado MD [facility] [address], [place] 6 Month F/u    In 3 months Cornelia Tomas MD [facility] 6 month follow up    In 5 months Vel Jc MD [facility] EP/ Dilated exam.    In 6 months King Jacobs MD [facility]                   Recent Outpatient Visits              4 weeks ago Onychomycosis    [facility] Meshulam, Kwasi Carlos, DPM    Office Visit    1 month ago Suspected COVID-19 virus infection     [facility] Jelly Kohler APRN    Office Visit    2 months ago Encounter for annual health examination    [facility] Cornelia Tomas MD    Office Visit    3 months ago Multiple nevi    [facility] [address], [place] Miesha Coronado MD    Office Visit    4 months ago Onychomycosis    [facility] Kwasi Handy, DPMELISA    Office Visit          Future Appointments         Provider Department Appt Notes    In 1 month Godwin Campa MD [facility] 6 mos (policy informed)    In 2 months Kwasi Handy DPMELISA [facility] NAIL CARE 3 MOS F/U    In 2 months Miesha Coronado MD [facility] [address], [place] 6 Month F/u    In 3 months Cornelia Tomas MD [facility] 6 month follow up    In 5 months Vel Jc MD [facility] EP/ Dilated exam.    In 6 months King Jacobs MD [facility]

## 2024-02-03 NOTE — TELEPHONE ENCOUNTER
Patient requesting a refill of:  citalopram 10 MG Oral Tab       And also a refill of the following when they are due:    amLODIPine 2.5 MG Oral Tab    metoprolol succinate ER 25 MG Oral Tablet 24 Hr   pravastatin 40 MG Oral Tab   triamterene-hydroCHLOROthiazide (DYAZIDE) 37.5-25 MG Oral Cap       Please send to her new pharmacy:  [facility] - [place], [place]

## 2024-02-04 RX ORDER — AMLODIPINE BESYLATE 2.5 MG/1
2.5 TABLET ORAL DAILY
Qty: 90 TABLET | Refills: 3 | Status: SHIPPED | OUTPATIENT
Start: 2024-02-04 | End: 2025-01-29

## 2024-02-04 RX ORDER — TRIAMTERENE AND HYDROCHLOROTHIAZIDE 37.5; 25 MG/1; MG/1
CAPSULE ORAL
Qty: 12 CAPSULE | Refills: 3 | Status: SHIPPED | OUTPATIENT
Start: 2024-02-04

## 2024-02-04 RX ORDER — METOPROLOL SUCCINATE 25 MG/1
25 TABLET, EXTENDED RELEASE ORAL DAILY
Qty: 90 TABLET | Refills: 3 | Status: SHIPPED | OUTPATIENT
Start: 2024-02-04

## 2024-03-07 ENCOUNTER — LAB ENCOUNTER (OUTPATIENT)
Dept: LAB | Facility: HOSPITAL | Age: 79
End: 2024-03-07
Attending: INTERNAL MEDICINE
Payer: MEDICARE

## 2024-03-07 DIAGNOSIS — N18.32 STAGE 3B CHRONIC KIDNEY DISEASE (HCC): ICD-10-CM

## 2024-03-07 LAB
ALBUMIN SERPL-MCNC: 4.3 G/DL (ref 3.2–4.8)
ANION GAP SERPL CALC-SCNC: 4 MMOL/L (ref 0–18)
BASOPHILS # BLD AUTO: 0.05 X10(3) UL (ref 0–0.2)
BASOPHILS NFR BLD AUTO: 1.1 %
BUN BLD-MCNC: 20 MG/DL (ref 9–23)
BUN/CREAT SERPL: 14.4 (ref 10–20)
CALCIUM BLD-MCNC: 9.5 MG/DL (ref 8.7–10.4)
CHLORIDE SERPL-SCNC: 109 MMOL/L (ref 98–112)
CO2 SERPL-SCNC: 28 MMOL/L (ref 21–32)
CREAT BLD-MCNC: 1.39 MG/DL
DEPRECATED RDW RBC AUTO: 43.1 FL (ref 35.1–46.3)
EGFRCR SERPLBLD CKD-EPI 2021: 39 ML/MIN/1.73M2 (ref 60–?)
EOSINOPHIL # BLD AUTO: 0.26 X10(3) UL (ref 0–0.7)
EOSINOPHIL NFR BLD AUTO: 5.5 %
ERYTHROCYTE [DISTWIDTH] IN BLOOD BY AUTOMATED COUNT: 12.8 % (ref 11–15)
GLUCOSE BLD-MCNC: 96 MG/DL (ref 70–99)
HCT VFR BLD AUTO: 39.6 %
HGB BLD-MCNC: 12.4 G/DL
IMM GRANULOCYTES # BLD AUTO: 0.01 X10(3) UL (ref 0–1)
IMM GRANULOCYTES NFR BLD: 0.2 %
LYMPHOCYTES # BLD AUTO: 1.38 X10(3) UL (ref 1–4)
LYMPHOCYTES NFR BLD AUTO: 29.2 %
MCH RBC QN AUTO: 28.8 PG (ref 26–34)
MCHC RBC AUTO-ENTMCNC: 31.3 G/DL (ref 31–37)
MCV RBC AUTO: 92.1 FL
MONOCYTES # BLD AUTO: 0.36 X10(3) UL (ref 0.1–1)
MONOCYTES NFR BLD AUTO: 7.6 %
NEUTROPHILS # BLD AUTO: 2.66 X10 (3) UL (ref 1.5–7.7)
NEUTROPHILS # BLD AUTO: 2.66 X10(3) UL (ref 1.5–7.7)
NEUTROPHILS NFR BLD AUTO: 56.4 %
OSMOLALITY SERPL CALC.SUM OF ELEC: 294 MOSM/KG (ref 275–295)
PHOSPHATE SERPL-MCNC: 3.8 MG/DL (ref 2.4–5.1)
PLATELET # BLD AUTO: 351 10(3)UL (ref 150–450)
POTASSIUM SERPL-SCNC: 4.1 MMOL/L (ref 3.5–5.1)
RBC # BLD AUTO: 4.3 X10(6)UL
SODIUM SERPL-SCNC: 141 MMOL/L (ref 136–145)
WBC # BLD AUTO: 4.7 X10(3) UL (ref 4–11)

## 2024-03-07 PROCEDURE — 36415 COLL VENOUS BLD VENIPUNCTURE: CPT

## 2024-03-07 PROCEDURE — 85025 COMPLETE CBC W/AUTO DIFF WBC: CPT

## 2024-03-07 PROCEDURE — 80069 RENAL FUNCTION PANEL: CPT

## 2024-03-12 ENCOUNTER — OFFICE VISIT (OUTPATIENT)
Dept: NEPHROLOGY | Facility: CLINIC | Age: 79
End: 2024-03-12
Payer: MEDICARE

## 2024-03-12 VITALS
BODY MASS INDEX: 33.13 KG/M2 | DIASTOLIC BLOOD PRESSURE: 60 MMHG | HEART RATE: 59 BPM | HEIGHT: 63 IN | WEIGHT: 187 LBS | SYSTOLIC BLOOD PRESSURE: 124 MMHG

## 2024-03-12 DIAGNOSIS — N18.32 STAGE 3B CHRONIC KIDNEY DISEASE (HCC): Primary | ICD-10-CM

## 2024-03-12 PROCEDURE — 99214 OFFICE O/P EST MOD 30 MIN: CPT | Performed by: INTERNAL MEDICINE

## 2024-03-12 NOTE — PROGRESS NOTES
03/12/24        Patient: Valery Casillas   YOB: 1945   Date of Visit: 3/12/2024       Dear  Dr. Thom MD,      Thank you for referring Valery Casillas to my practice.  Please find my assessment and plan below.      As you know she is a 78-year-old female with a history of hypertension, hypercholesterolemia, melanoma involving her right with loss of vision, depression who I now had the pleasure of seeing for follow-up of chronic kidney disease stage III.  Overall the patient states she is doing well without any chest pain, shortness of breath, GI or urinary tract symptoms.  Has developed a cataract in the left eye.  So far does not need surgery which they are reluctant to do anyway given that she has no significant vision on the right.    On physical exam her blood pressure 124/60 with a pulse of 59 she weighed 187 pounds.  Her neck was supple without JVD.  Lungs were clear.  Heart revealed a regular rate and rhythm with an S4 but no gallops, murmurs or rubs.  Abdomen was soft, flat, nontender without organomegaly, masses or bruits.  Extremities revealed no edema.    I reviewed her most recent labs done on March 7, 2024.  Creatinine overall stable at 1.39 with an estimated GFR 39 cc/min.  Electrolytes and hemoglobin were good.    I therefore reassured the patient that overall her renal function is stable.  Blood pressures appear to be under good control.  She will continue to do CBC and renal panels quarterly.  Maintain adequate hydration.  Avoid nonsteroidals.  Will see again in 6 months for follow-up or sooner if clinically indicated.    Thank you again for allowing me to participate in the care of your patient.  If you have any questions please feel free to call.           Sincerely,   Godwin Campa MD   [facility], [facility], [place]  [address]  [facility] [place] [address]    Document electronically generated by:  Godwin Campa MD

## 2024-03-25 ENCOUNTER — HOSPITAL ENCOUNTER (OUTPATIENT)
Dept: MAMMOGRAPHY | Facility: HOSPITAL | Age: 79
Discharge: HOME | End: 2024-03-25
Attending: STUDENT IN AN ORGANIZED HEALTH CARE EDUCATION/TRAINING PROGRAM
Payer: MEDICARE

## 2024-03-25 DIAGNOSIS — Z12.31 ENCOUNTER FOR SCREENING MAMMOGRAM FOR MALIGNANT NEOPLASM OF BREAST: ICD-10-CM

## 2024-03-25 PROCEDURE — 77067 SCR MAMMO BI INCL CAD: CPT | Performed by: STUDENT IN AN ORGANIZED HEALTH CARE EDUCATION/TRAINING PROGRAM

## 2024-03-25 PROCEDURE — 77063 BREAST TOMOSYNTHESIS BI: CPT | Performed by: STUDENT IN AN ORGANIZED HEALTH CARE EDUCATION/TRAINING PROGRAM

## 2024-04-29 ENCOUNTER — OFFICE VISIT (OUTPATIENT)
Dept: PODIATRY CLINIC | Facility: CLINIC | Age: 79
End: 2024-04-29

## 2024-04-29 DIAGNOSIS — M79.675 TOE PAIN, BILATERAL: ICD-10-CM

## 2024-04-29 DIAGNOSIS — L84 FOOT CALLUS: ICD-10-CM

## 2024-04-29 DIAGNOSIS — B35.1 ONYCHOMYCOSIS: Primary | ICD-10-CM

## 2024-04-29 DIAGNOSIS — M79.674 TOE PAIN, BILATERAL: ICD-10-CM

## 2024-04-29 PROCEDURE — 99213 OFFICE O/P EST LOW 20 MIN: CPT | Performed by: PODIATRIST

## 2024-04-29 NOTE — PROGRESS NOTES
[facility] Podiatry  Progress Note    Valery Casillas is a 79 year old female.   Chief Complaint   Patient presents with    Toenail Care     4.5 mo f/u - has no c/o regarding her feet besides the length of her toenails         HPI:     This is a pleasant female with HTN and PMH of carotid endarterectomy, CKD stage 3.      She presents to clinic today for nail care.  She complains of long thick toenails which she is unable to trim herself.  She also has calluses on the bottom of her great toes.          Allergies: Adhesive tape   Current Outpatient Medications   Medication Sig Dispense Refill    amLODIPine 2.5 MG Oral Tab Take 1 tablet (2.5 mg total) by mouth daily. 90 tablet 3    metoprolol succinate ER 25 MG Oral Tablet 24 Hr Take 1 tablet (25 mg total) by mouth daily. 90 tablet 3    triamterene-hydroCHLOROthiazide (DYAZIDE) 37.5-25 MG Oral Cap 1capsule 1 times a week 12 capsule 3    citalopram 10 MG Oral Tab Take 1 tablet (10 mg total) by mouth once daily. 90 tablet 3    pravastatin 40 MG Oral Tab Take 1 tablet (40 mg total) by mouth nightly. 90 tablet 3    albuterol 108 (90 Base) MCG/ACT Inhalation Aero Soln Inhale 2 puffs into the lungs every 4 (four) hours as needed. 18 g 0    Spacer/Aero-Holding Chambers Does not apply Device Use with albuterol inhaler as needed 1 each 0    guaiFENesin 100 MG/5 ML Oral Take 5 mL (100 mg total) by mouth every 4 (four) hours as needed. 180 mL 0    aspirin 325 MG Oral Tab Take 0.5 tablets (162.5 mg total) by mouth daily. 90 tablet 1    Calcium-Phosphorus-Vitamin D (CITRACAL +D3 OR) Take 1 tablet by mouth daily.      Vitamin B-12 (VITAMIN B12) 1000 MCG Oral Tab Take 1 tablet (1,000 mcg total) by mouth daily.      loratadine (CLARITIN) 10 MG Oral Tab Take 1 tablet (10 mg total) by mouth daily.      folic acid (FOLVITE) 400 MCG Oral Tab Take 1 tablet (400 mcg total) by mouth daily.      Acetaminophen (TYLENOL EXTRA STRENGTH OR) Take  by mouth.      Multiple Vitamins-Minerals  (CENTRUM SILVER) Oral Tab Take 1 tablet by mouth daily.          Past Medical History:    Allergic rhinitis    Anxiety state, unspecified    Carotid artery disease (HCC)    Cataracts, both eyes    OU    Depression    Kidney disease, chronic, stage III (GFR 30-59 ml/min) (HCC)    Melanoma, choroid, right eye (HCC)    OD, Radiation plaque for 7 days; per NG    Other and unspecified hyperlipidemia    Retinal scar of right eye    \"Retinal scarring due to melanoma/ radiation\"    Unspecified essential hypertension      Past Surgical History:   Procedure Laterality Date    Cholecystectomy      Colonoscopy      Hysterectomy      Other surgical history      Melanoma of Choroid: Radiation plaque for 7 days; treated      Family History   Problem Relation Age of Onset    Heart Attack Father 54        MI; per NG    Stroke Mother 78        per NG    Breast Cancer Mother 76        per NG    Heart Disorder Mother         CHF; per NG    Hypertension Mother         per NG    Stroke Maternal Grandmother 69        Cause of death; per NG    Diabetes Other         Aunt; per NG    Lipids Other         Family H/o Hyperlipidemia; per NG    Hypertension Maternal Uncle         per NG    Breast Cancer Paternal Aunt 70    Glaucoma Neg     Macular degeneration Neg       Social History     Socioeconomic History    Marital status:    Tobacco Use    Smoking status: Former     Current packs/day: 0.00     Average packs/day: 0.5 packs/day for 14.0 years (7.0 ttl pk-yrs)     Types: Cigarettes     Start date: 1965     Quit date: 1979     Years since quittin.3    Smokeless tobacco: Never   Vaping Use    Vaping status: Never Used   Substance and Sexual Activity    Alcohol use: No     Alcohol/week: 0.0 standard drinks of alcohol    Drug use: No    Sexual activity: Not Currently     Partners: Male   Other Topics Concern    Caffeine Concern Yes     Comment: Coffee, 2 cups; per NG    Pt has a pacemaker No    Pt has a  defibrillator No    Reaction to local anesthetic No           REVIEW OF SYSTEMS:   Denies nausea, fever, chills  No calf pain  No other muscle or joint aches  Denies chest pain or shortness of breath.      EXAM:   LMP  (LMP Unknown)     Constitutional:   Patient in no apparent distress. Well kept. Of normal body habitus. Alert and oriented to person, place, and time.  Vascular Examination:  DP pulse is 2/4  PT pulse is NP  Capillary refill is immediate  Integumentary Examination:   The patient's nails appear incurvated, thickened, elongated, dystrophic, discolored with subungual debris 1-5 right, 1-5  left nails.    Callus to b/l plantar hallux IPJ    Digital hair growth is absent  Skin is of diminished texture and decreased turgor.  Neurological Examination:  Sharp/dull is present to right and is present to left.  Parasthesias absent.  Musculoskeletal Examination:  Muscle Strength is 5/5.  Bunions Deformity present  bilateral.  Hammer digit deformity present digits 2-5  bilateral.  Pain on palpation to nails.          LABS & IMAGING:     Lab Results   Component Value Date    GLU 96 03/07/2024    BUN 20 03/07/2024    CREATSERUM 1.39 (H) 03/07/2024    BUNCREA 14.4 03/07/2024    ANIONGAP 4 03/07/2024    GFRAA 44 (L) 07/19/2022    GFRNAA 38 (L) 07/19/2022    CA 9.5 03/07/2024     03/07/2024    K 4.1 03/07/2024     03/07/2024    CO2 28.0 03/07/2024    OSMOCALC 294 03/07/2024        Lab Results   Component Value Date    A1C 5.7 10/19/2017        No results found.     ASSESSMENT AND PLAN:   Diagnoses and all orders for this visit:    Onychomycosis    Toe pain, bilateral    Foot callus              Plan:     Evaluated the patient. Discussed treatment options with the patient.  Discussed with patient proper care and hygiene for their feet.  Patient tolerated procedures well, without incident.    Instrumentation used includes nail nippers and electric  where appropriate.  Procedure: (24202 Debridement of  toenails 6-10) Surgically debrided and mechanically reduced 6 or more toenails.Hemmorhage occurred none.Nails that were debrided appeared dystrophic and caused the patient pain in shoe gear.Nails 5 Left & 5 Right.      Evaluated patient. Discussed treatment options with patient.  Discussed proper hygiene and care for feet as well as use of emollient creams (ie Urea based creams)  Answered all patient questions. Discussed offloading hyperkeratotic lesions with proper shoe gear, offloading pads, and insoles.    Procedures: (CPT 48308 Paring or cutting of benign hyperkeratotic lesion 2-4)  2 lesions pared utilizing #15 blade to b/l foot without incident.      RTC 3 months for nail care    No follow-ups on file.    Kwasi Handy DPM  4/29/24

## 2024-05-02 ENCOUNTER — OFFICE VISIT (OUTPATIENT)
Dept: DERMATOLOGY CLINIC | Facility: CLINIC | Age: 79
End: 2024-05-02
Payer: MEDICARE

## 2024-05-02 DIAGNOSIS — B37.2 INTERTRIGINOUS CANDIDIASIS: ICD-10-CM

## 2024-05-02 DIAGNOSIS — D22.9 MULTIPLE NEVI: ICD-10-CM

## 2024-05-02 DIAGNOSIS — Z85.820 ENCOUNTER FOR FOLLOW-UP SURVEILLANCE OF MELANOMA: Primary | ICD-10-CM

## 2024-05-02 DIAGNOSIS — D23.9 BENIGN NEOPLASM OF SKIN, UNSPECIFIED LOCATION: ICD-10-CM

## 2024-05-02 DIAGNOSIS — Z86.018 HISTORY OF DYSPLASTIC NEVUS: ICD-10-CM

## 2024-05-02 DIAGNOSIS — L57.0 AK (ACTINIC KERATOSIS): ICD-10-CM

## 2024-05-02 DIAGNOSIS — L81.4 LENTIGO: ICD-10-CM

## 2024-05-02 DIAGNOSIS — L82.1 SEBORRHEIC KERATOSES: ICD-10-CM

## 2024-05-02 DIAGNOSIS — C69.90 OCULAR MELANOMA, UNSPECIFIED LATERALITY (HCC): ICD-10-CM

## 2024-05-02 DIAGNOSIS — Z08 ENCOUNTER FOR FOLLOW-UP SURVEILLANCE OF MELANOMA: Primary | ICD-10-CM

## 2024-05-02 DIAGNOSIS — Z13.89 ENCOUNTER FOR SURVEILLANCE OF ABNORMAL NEVI: ICD-10-CM

## 2024-05-02 PROCEDURE — 99213 OFFICE O/P EST LOW 20 MIN: CPT | Performed by: DERMATOLOGY

## 2024-05-02 PROCEDURE — G2211 COMPLEX E/M VISIT ADD ON: HCPCS | Performed by: DERMATOLOGY

## 2024-05-13 NOTE — PROGRESS NOTES
Valery Casillas is a 79 year old female.  HPI:     CC:    Chief Complaint   Patient presents with    Full Skin Exam     LOV 10/19/23. Patient with Hx of melanoma, present for FBSE. Denies any concerns at this time.         Allergies:  Adhesive tape    HISTORY:    Past Medical History:    Allergic rhinitis    Anxiety state, unspecified    Carotid artery disease (HCC)    Cataracts, both eyes    OU    Depression    Kidney disease, chronic, stage III (GFR 30-59 ml/min) (HCC)    Melanoma, choroid, right eye (HCC)    OD, Radiation plaque for 7 days; per NG    Other and unspecified hyperlipidemia    Retinal scar of right eye    \"Retinal scarring due to melanoma/ radiation\"    Unspecified essential hypertension      Past Surgical History:   Procedure Laterality Date    Cholecystectomy      Colonoscopy      Hysterectomy      Other surgical history      Melanoma of Choroid: Radiation plaque for 7 days; treated      Family History   Problem Relation Age of Onset    Heart Attack Father 54        MI; per NG    Stroke Mother 78        per NG    Breast Cancer Mother 76        per NG    Heart Disorder Mother         CHF; per NG    Hypertension Mother         per NG    Stroke Maternal Grandmother 69        Cause of death; per NG    Diabetes Other         Aunt; per NG    Lipids Other         Family H/o Hyperlipidemia; per NG    Hypertension Maternal Uncle         per NG    Breast Cancer Paternal Aunt 70    Glaucoma Neg     Macular degeneration Neg       Social History     Socioeconomic History    Marital status:    Tobacco Use    Smoking status: Former     Current packs/day: 0.00     Average packs/day: 0.5 packs/day for 14.0 years (7.0 ttl pk-yrs)     Types: Cigarettes     Start date: 1965     Quit date: 1979     Years since quittin.3    Smokeless tobacco: Never   Vaping Use    Vaping status: Never Used   Substance and Sexual Activity    Alcohol use: No     Alcohol/week: 0.0 standard drinks of  alcohol    Drug use: No    Sexual activity: Not Currently     Partners: Male   Other Topics Concern    Caffeine Concern Yes     Comment: Coffee, 2 cups; per NG    Pt has a pacemaker No    Pt has a defibrillator No    Reaction to local anesthetic No        Current Outpatient Medications   Medication Sig Dispense Refill    amLODIPine 2.5 MG Oral Tab Take 1 tablet (2.5 mg total) by mouth daily. 90 tablet 3    metoprolol succinate ER 25 MG Oral Tablet 24 Hr Take 1 tablet (25 mg total) by mouth daily. 90 tablet 3    triamterene-hydroCHLOROthiazide (DYAZIDE) 37.5-25 MG Oral Cap 1capsule 1 times a week 12 capsule 3    citalopram 10 MG Oral Tab Take 1 tablet (10 mg total) by mouth once daily. 90 tablet 3    pravastatin 40 MG Oral Tab Take 1 tablet (40 mg total) by mouth nightly. 90 tablet 3    albuterol 108 (90 Base) MCG/ACT Inhalation Aero Soln Inhale 2 puffs into the lungs every 4 (four) hours as needed. 18 g 0    Spacer/Aero-Holding Chambers Does not apply Device Use with albuterol inhaler as needed 1 each 0    guaiFENesin 100 MG/5 ML Oral Take 5 mL (100 mg total) by mouth every 4 (four) hours as needed. 180 mL 0    aspirin 325 MG Oral Tab Take 0.5 tablets (162.5 mg total) by mouth daily. 90 tablet 1    Calcium-Phosphorus-Vitamin D (CITRACAL +D3 OR) Take 1 tablet by mouth daily.      Vitamin B-12 (VITAMIN B12) 1000 MCG Oral Tab Take 1 tablet (1,000 mcg total) by mouth daily.      loratadine (CLARITIN) 10 MG Oral Tab Take 1 tablet (10 mg total) by mouth daily.      folic acid (FOLVITE) 400 MCG Oral Tab Take 1 tablet (400 mcg total) by mouth daily.      Acetaminophen (TYLENOL EXTRA STRENGTH OR) Take  by mouth.      Multiple Vitamins-Minerals (CENTRUM SILVER) Oral Tab Take 1 tablet by mouth daily.         Allergies:   Allergies   Allergen Reactions    Adhesive Tape RASH     Pulled the hair off       Past Medical History:    Allergic rhinitis    Anxiety state, unspecified    Carotid artery disease (HCC)    Cataracts, both  eyes    OU    Depression    Kidney disease, chronic, stage III (GFR 30-59 ml/min) (HCC)    Melanoma, choroid, right eye (HCC)    OD, Radiation plaque for 7 days; per NG    Other and unspecified hyperlipidemia    Retinal scar of right eye    \"Retinal scarring due to melanoma/ radiation\"    Unspecified essential hypertension     Past Surgical History:   Procedure Laterality Date    Cholecystectomy      Colonoscopy      Hysterectomy      Other surgical history      Melanoma of Choroid: Radiation plaque for 7 days; treated     Social History     Socioeconomic History    Marital status:      Spouse name: Not on file    Number of children: Not on file    Years of education: Not on file    Highest education level: Not on file   Occupational History    Not on file   Tobacco Use    Smoking status: Former     Current packs/day: 0.00     Average packs/day: 0.5 packs/day for 14.0 years (7.0 ttl pk-yrs)     Types: Cigarettes     Start date: 1965     Quit date: 1979     Years since quittin.3    Smokeless tobacco: Never   Vaping Use    Vaping status: Never Used   Substance and Sexual Activity    Alcohol use: No     Alcohol/week: 0.0 standard drinks of alcohol    Drug use: No    Sexual activity: Not Currently     Partners: Male   Other Topics Concern     Service Not Asked    Blood Transfusions Not Asked    Caffeine Concern Yes     Comment: Coffee, 2 cups; per NG    Occupational Exposure Not Asked    Hobby Hazards Not Asked    Sleep Concern Not Asked    Stress Concern Not Asked    Weight Concern Not Asked    Special Diet Not Asked    Back Care Not Asked    Exercise Not Asked    Bike Helmet Not Asked    Seat Belt Not Asked    Self-Exams Not Asked    Grew up on a farm Not Asked    History of tanning Not Asked    Outdoor occupation Not Asked    Pt has a pacemaker No    Pt has a defibrillator No    Breast feeding Not Asked    Reaction to local anesthetic No   Social History Narrative    Not on  file     Social Determinants of Health     Financial Resource Strain: Not on file   Food Insecurity: Not on file   Transportation Needs: Not on file   Physical Activity: Not on file   Stress: Not on file   Social Connections: Not on file   Housing Stability: Not on file     Family History   Problem Relation Age of Onset    Heart Attack Father 54        MI; per NG    Stroke Mother 78        per NG    Breast Cancer Mother 76        per NG    Heart Disorder Mother         CHF; per NG    Hypertension Mother         per NG    Stroke Maternal Grandmother 69        Cause of death; per NG    Diabetes Other         Aunt; per NG    Lipids Other         Family H/o Hyperlipidemia; per NG    Hypertension Maternal Uncle         per NG    Breast Cancer Paternal Aunt 70    Glaucoma Neg     Macular degeneration Neg        There were no vitals filed for this visit.    HPI:    Chief Complaint   Patient presents with    Full Skin Exam     LOV 10/19/23. Patient with Hx of melanoma, present for FBSE. Denies any concerns at this time.     Here for follow-up history of dysplastic nevi AK's, ocular melanoma no particular concerns.  Has been active in choir again.  Has not noted any particular changing lesions.  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  No particular lesions of concern    Her melanoma has been stable.  Nothing changing    Concerned as renal disease worsening stage III presently.  Able to participate in more activities at [facility]    Patient attributes health decline to stress= her  remains at [employer]    Past notes/ records and appropriate/relevant lab results including pathology and past body maps reviewed. Updated and new information noted in current visit.     Patient presents with concerns above.  Overall doing well,  stressed.    Patient has been in their usual state of health.  History, medications, allergies reviewed as noted.      ROS:  Denies any other systemic complaints.  No new or changeing lesions other than noted above. No fevers, chills, night sweats, unusual sun sensitivity.  No other skin complaints.        History, medications, allergies reviewed as noted.       Physical Examination:     Well-developed well-nourished patient alert oriented in no acute distress.  Exam total-body performed, including scalp, head, neck, face,nails, hair, external eyes, including conjunctival mucosa, eyelids, lips external ears, back, chest,/ breasts, axillae,  abdomen, arms, legs, palms.     Multiple light to medium brown, well marginated, uniformly pigmented, macules and papules 6 mm and less scattered on exam. pigmented lesions examined with dermoscopy benign-appearing patterns.     Waxy tannish keratotic papules scattered, cherry-red vascular papules scattered.    See map today's date for lesions noted .      Otherwise remarkable for lesions as noted on map.  See details of examination  See Assessment /Plan for additional history and physical exam also:    Assessment / plan:    No orders of the defined types were placed in this encounter.      Meds & Refills for this Visit:  Requested Prescriptions      No prescriptions requested or ordered in this encounter         Encounter Diagnoses   Name Primary?    Encounter for follow-up surveillance of melanoma Yes    Encounter for surveillance of abnormal nevi     History of dysplastic nevus     Ocular melanoma, unspecified laterality (HCC)     Multiple nevi     Seborrheic keratoses     Lentigo     Benign neoplasm of skin, unspecified location     AK (actinic keratosis)     Intertriginous candidiasis        See details on map.      Remarkable for:     Patient seen for follow-up long-term monitoring, treatment of  Ocular melanoma, dysplastic nevi, numerous pigmented lesions.  Patient at high  risk for cutaneous melanoma.  History of actinic keratoses as well.  Requiring ongoing surveillance, monitoring.  See previous notes.  Plan is ongoing monitoring, observation for any new suspicious lesions.    No new suspicious lesions.  Larger lentigo left lateral upper arm continue monitoring larger lentigo at left lateral upper arm monitor carefully no atypical pattern on dermoscopy  Recheck at follow-up in 6 months    left breast  Compound lentiginous nevus.  10/22 no recurrence    Extensive nevi history of dysplastic nevi nevi.  No new suspicious pigmented lesions.    No significant changes in other  skin lesions    Previous waxy tan keratotic of the right flank stable.  Extensive benign keratoses noted.  Increasing number of lesions.  Asymptomatic.  Observation.  No treatment at this time.    History of omphalitis, intertrigo stable intertrigo has been less problematic recently.  Continues to follow for her ocular melanoma   no new suspicious lesions      Umbilical inflammation in the past resolved is remained clear.  Intertrigo stable.   Discussed topical and oral medications. fluconazole again if this flares up.  If omphalitis flares resume mupirocin ketoconazole regularly.      stable nevus nevi stable overall no significant changes  Macule 5 mm with darker brown central pigmentation terminal hairs at left anterior thigh observe.  Unchanged stable no other new suspicious lesions      History of ocular melanoma stable continues to follow-up at [facility] annually overall has been stable    Continue regular follow-up no active AK's.  History of AK's continue careful sun protection stable     History of dysplastic nevi.  No recurrence of atypical nevi.  No new suspicious lesions.  Continue to observe papular lesion at right medial cheek.no change    Extensive benign keratoses reassurance recheck 6 months    Please refer to map for specific lesions.  See additional diagnoses.  Pros cons of various therapies, risks  benefits discussed.Pathophysiology discussed with patient.  Therapeutic options reviewed.  See  Medications in grid.  Instructions reviewed at length.    Benign nevi, seborrheic  keratoses, cherry angiomas:  Reassurance regarding other benign skin lesions.Signs and symptoms of skin cancer, ABCDE's of melanoma discussed with patient. Sunscreen use, sun protection, self exams reviewed.  Followup as noted RTC routine checkup 4-6 mos  or p.r.n.  Encounter Times  Including precharting, reviewing chart, prior notes obtaining history: 5 minutes, medical exam :10 minutes, notes on body map, plan, counseling 10minutes My total time spent caring for the patient on the day of the encounter: 25 minutes    The patient indicates understanding of these issues and agrees to the plan.  The patient is asked to return as noted in follow-up/ above.    This note was generated using Dragon voice recognition software.  Please contact me regarding any confusion resulting from errors in recognition.

## 2024-06-22 ENCOUNTER — LAB ENCOUNTER (OUTPATIENT)
Dept: LAB | Facility: HOSPITAL | Age: 79
End: 2024-06-22
Attending: INTERNAL MEDICINE

## 2024-06-22 ENCOUNTER — TELEPHONE (OUTPATIENT)
Dept: NEPHROLOGY | Facility: CLINIC | Age: 79
End: 2024-06-22

## 2024-06-22 DIAGNOSIS — N18.32 STAGE 3B CHRONIC KIDNEY DISEASE (HCC): ICD-10-CM

## 2024-06-22 DIAGNOSIS — N18.32 STAGE 3B CHRONIC KIDNEY DISEASE (HCC): Primary | ICD-10-CM

## 2024-06-22 LAB
ALBUMIN SERPL-MCNC: 4.3 G/DL (ref 3.2–4.8)
ANION GAP SERPL CALC-SCNC: 6 MMOL/L (ref 0–18)
BASOPHILS # BLD AUTO: 0.06 X10(3) UL (ref 0–0.2)
BASOPHILS NFR BLD AUTO: 1.3 %
BUN BLD-MCNC: 21 MG/DL (ref 9–23)
BUN/CREAT SERPL: 15.2 (ref 10–20)
CALCIUM BLD-MCNC: 9.5 MG/DL (ref 8.7–10.4)
CHLORIDE SERPL-SCNC: 109 MMOL/L (ref 98–112)
CO2 SERPL-SCNC: 27 MMOL/L (ref 21–32)
CREAT BLD-MCNC: 1.38 MG/DL
DEPRECATED RDW RBC AUTO: 43.3 FL (ref 35.1–46.3)
EGFRCR SERPLBLD CKD-EPI 2021: 39 ML/MIN/1.73M2 (ref 60–?)
EOSINOPHIL # BLD AUTO: 0.31 X10(3) UL (ref 0–0.7)
EOSINOPHIL NFR BLD AUTO: 6.8 %
ERYTHROCYTE [DISTWIDTH] IN BLOOD BY AUTOMATED COUNT: 12.6 % (ref 11–15)
GLUCOSE BLD-MCNC: 106 MG/DL (ref 70–99)
HCT VFR BLD AUTO: 39.1 %
HGB BLD-MCNC: 12.2 G/DL
IMM GRANULOCYTES # BLD AUTO: 0.01 X10(3) UL (ref 0–1)
IMM GRANULOCYTES NFR BLD: 0.2 %
LYMPHOCYTES # BLD AUTO: 1.45 X10(3) UL (ref 1–4)
LYMPHOCYTES NFR BLD AUTO: 31.9 %
MCH RBC QN AUTO: 29.2 PG (ref 26–34)
MCHC RBC AUTO-ENTMCNC: 31.2 G/DL (ref 31–37)
MCV RBC AUTO: 93.5 FL
MONOCYTES # BLD AUTO: 0.4 X10(3) UL (ref 0.1–1)
MONOCYTES NFR BLD AUTO: 8.8 %
NEUTROPHILS # BLD AUTO: 2.32 X10 (3) UL (ref 1.5–7.7)
NEUTROPHILS # BLD AUTO: 2.32 X10(3) UL (ref 1.5–7.7)
NEUTROPHILS NFR BLD AUTO: 51 %
OSMOLALITY SERPL CALC.SUM OF ELEC: 297 MOSM/KG (ref 275–295)
PHOSPHATE SERPL-MCNC: 3.8 MG/DL (ref 2.4–5.1)
PLATELET # BLD AUTO: 332 10(3)UL (ref 150–450)
POTASSIUM SERPL-SCNC: 4.5 MMOL/L (ref 3.5–5.1)
RBC # BLD AUTO: 4.18 X10(6)UL
SODIUM SERPL-SCNC: 142 MMOL/L (ref 136–145)
WBC # BLD AUTO: 4.6 X10(3) UL (ref 4–11)

## 2024-06-22 PROCEDURE — 80069 RENAL FUNCTION PANEL: CPT

## 2024-06-22 PROCEDURE — 85025 COMPLETE CBC W/AUTO DIFF WBC: CPT

## 2024-06-22 PROCEDURE — 36415 COLL VENOUS BLD VENIPUNCTURE: CPT

## 2024-07-03 ENCOUNTER — OFFICE VISIT (OUTPATIENT)
Dept: OPHTHALMOLOGY | Facility: CLINIC | Age: 79
End: 2024-07-03
Payer: MEDICARE

## 2024-07-03 DIAGNOSIS — C69.91 OCULAR MELANOMA, RIGHT (HCC): Primary | ICD-10-CM

## 2024-07-03 DIAGNOSIS — H43.392 FLOATER, VITREOUS, LEFT: ICD-10-CM

## 2024-07-03 DIAGNOSIS — H25.13 AGE-RELATED NUCLEAR CATARACT OF BOTH EYES: ICD-10-CM

## 2024-07-03 PROCEDURE — 92014 COMPRE OPH EXAM EST PT 1/>: CPT | Performed by: OPHTHALMOLOGY

## 2024-07-03 PROCEDURE — 92015 DETERMINE REFRACTIVE STATE: CPT | Performed by: OPHTHALMOLOGY

## 2024-07-03 NOTE — ASSESSMENT & PLAN NOTE
Discussed with patient that there is a significant cataract in the right eye, but due to poor vision secondary to melanoma will not consider cataract surgery unless Dr. Cornell recommends it.    Patient saw Dr. Cornell in December of 2022 and he did not recommend any treatment on either cataract at that time.   He recommended observation.   Will follow.   Patient says she is not bothered by vision in the left eye; therefore she is not interested in cataract surgery in the left eye.      New glasses today; update as needed.

## 2024-07-03 NOTE — ASSESSMENT & PLAN NOTE
Stable; Diagnosed in 2010 and treated by Dr. Cornell at [place].  Continue to follow up yearly.    Pt last saw Dr. Cornell in December of 2023 and will  follow up with him in a year.

## 2024-07-03 NOTE — PATIENT INSTRUCTIONS
Ocular melanoma, right (HCC)  Stable; Diagnosed in 2010 and treated by Dr. Cornell at [place].  Continue to follow up yearly.    Pt last saw Dr. Cornell in December of 2023 and will  follow up with him in a year.           Age-related nuclear cataract of both eyes  Discussed with patient that there is a significant cataract in the right eye, but due to poor vision secondary to melanoma will not consider cataract surgery unless Dr. Cornell recommends it.    Patient saw Dr. Cornell in December of 2022 and he did not recommend any treatment on either cataract at that time.   He recommended observation.   Will follow.   Patient says she is not bothered by vision in the left eye; therefore she is not interested in cataract surgery in the left eye.      New glasses today; update as needed.          Floater, vitreous, left   There is no evidence of retinal pathology.  All signs and symptoms of retinal detachment/tears explained in detail.    Patient instructed to call the office if they experience increase in floaters, increase in flashes of light, loss of vision or curtain or veil effect.

## 2024-07-03 NOTE — PROGRESS NOTES
Valery Casillas is a 79 year old female.    HPI:     HPI    Patient is here for a complete exam.  Patient states her vision has remained stable.  She was seen by Dr. Cornell on 23 and she will follow-up with him in 2024.    Last edited by Marta Shelton OT on 7/3/2024  9:46 AM.        Patient History:  Past Medical History:    Allergic rhinitis    Anxiety state, unspecified    Carotid artery disease (HCC)    Cataracts, both eyes    OU    Depression    Kidney disease, chronic, stage III (GFR 30-59 ml/min) (HCC)    Melanoma, choroid, right eye (HCC)    OD, Radiation plaque for 7 days; per NG    Other and unspecified hyperlipidemia    Retinal scar of right eye    \"Retinal scarring due to melanoma/ radiation\"    Unspecified essential hypertension       Surgical History: Valery Casillas has a past surgical history that includes hysterectomy (); cholecystectomy (); colonoscopy; and other surgical history () (Melanoma of Choroid: Radiation plaque for 7 days; treated).    Family History   Problem Relation Age of Onset    Heart Attack Father 54        MI; per NG    Stroke Mother 78        per NG    Breast Cancer Mother 76        per NG    Heart Disorder Mother         CHF; per NG    Hypertension Mother         per NG    Stroke Maternal Grandmother 69        Cause of death; per NG    Diabetes Other         Aunt; per NG    Lipids Other         Family H/o Hyperlipidemia; per NG    Hypertension Maternal Uncle         per NG    Breast Cancer Paternal Aunt 70    Glaucoma Neg     Macular degeneration Neg        Social History:   Social History     Socioeconomic History    Marital status:    Tobacco Use    Smoking status: Former     Current packs/day: 0.00     Average packs/day: 0.5 packs/day for 14.0 years (7.0 ttl pk-yrs)     Types: Cigarettes     Start date: 1965     Quit date: 1979     Years since quittin.5    Smokeless tobacco: Never   Vaping Use    Vaping status: Never Used    Substance and Sexual Activity    Alcohol use: No     Alcohol/week: 0.0 standard drinks of alcohol    Drug use: No    Sexual activity: Not Currently     Partners: Male   Other Topics Concern    Caffeine Concern Yes     Comment: Coffee, 2 cups; per NG    Pt has a pacemaker No    Pt has a defibrillator No    Reaction to local anesthetic No       Medications:  Current Outpatient Medications   Medication Sig Dispense Refill    amLODIPine 2.5 MG Oral Tab Take 1 tablet (2.5 mg total) by mouth daily. 90 tablet 3    metoprolol succinate ER 25 MG Oral Tablet 24 Hr Take 1 tablet (25 mg total) by mouth daily. 90 tablet 3    triamterene-hydroCHLOROthiazide (DYAZIDE) 37.5-25 MG Oral Cap 1capsule 1 times a week 12 capsule 3    citalopram 10 MG Oral Tab Take 1 tablet (10 mg total) by mouth once daily. 90 tablet 3    pravastatin 40 MG Oral Tab Take 1 tablet (40 mg total) by mouth nightly. 90 tablet 3    albuterol 108 (90 Base) MCG/ACT Inhalation Aero Soln Inhale 2 puffs into the lungs every 4 (four) hours as needed. 18 g 0    Spacer/Aero-Holding Chambers Does not apply Device Use with albuterol inhaler as needed 1 each 0    guaiFENesin 100 MG/5 ML Oral Take 5 mL (100 mg total) by mouth every 4 (four) hours as needed. 180 mL 0    aspirin 325 MG Oral Tab Take 0.5 tablets (162.5 mg total) by mouth daily. 90 tablet 1    Calcium-Phosphorus-Vitamin D (CITRACAL +D3 OR) Take 1 tablet by mouth daily.      Vitamin B-12 (VITAMIN B12) 1000 MCG Oral Tab Take 1 tablet (1,000 mcg total) by mouth daily.      loratadine (CLARITIN) 10 MG Oral Tab Take 1 tablet (10 mg total) by mouth daily.      folic acid (FOLVITE) 400 MCG Oral Tab Take 1 tablet (400 mcg total) by mouth daily.      Acetaminophen (TYLENOL EXTRA STRENGTH OR) Take  by mouth.      Multiple Vitamins-Minerals (CENTRUM SILVER) Oral Tab Take 1 tablet by mouth daily.           Allergies:  Allergies   Allergen Reactions    Adhesive Tape RASH     Pulled the hair off       ROS:       PHYSICAL  EXAM:     Base Eye Exam       Visual Acuity (Snellen - Linear)         Right Left    Dist cc LP 20/40 -2    Dist ph cc  20/40    Near cc >20/800 20/25      Correction: Glasses              Tonometry ([facility], 10:06 AM)         Right Left    Pressure 12 13              Pupils    Right eye: +APD   Left eye: round and reactive                Visual Fields         Left Right     Full     Restrictions  Total superior temporal, inferior temporal, superior nasal, inferior nasal deficiencies              Extraocular Movement    30 PD R XT per [facility]             Neuro/Psych       Oriented x3: Yes    Mood/Affect: Normal              Dilation       Both eyes: 1.0% Mydriacyl and 2.5% Bladimir Synephrine @ 10:06 AM                  Slit Lamp and Fundus Exam       Slit Lamp Exam         Right Left    Lids/Lashes Dermatochalasis, Meibomian gland dysfunction Dermatochalasis, Meibomian gland dysfunction    Conjunctiva/Sclera Normal Normal    Cornea Clear Clear    Anterior Chamber Deep and quiet Deep and quiet    Iris Normal Normal    Lens brunescent cataract with 1-2+ Cortical cataract, 4+ PSC brunescent cataract, Trace Cortical cataract    Vitreous x Vitreous floaters              Fundus Exam         Right Left    Disc x Good rim, Temporal crescent    C/D Ratio x 0.5- poor view OU 2nd to cataract    Macula red reflex only Normal    Vessels x Normal    Periphery x Normal                  Refraction       Wearing Rx         Sphere Cylinder Axis Add    Right Balance       Left -0.75 +1.50 165 +3.00      Type: Flat top bifocal              Manifest Refraction         Sphere Cylinder [place] Dist VA Add Near VA    Right Balance         Left -0.75 +1.75 165 20/40 +3.00 20/25              Final Rx         Sphere Cylinder [place] Dist VA Add Near VA    Right Balance         Left -0.75 +1.75 165 20/40 +3.00 20/25      Type: Flat top bifocal                     ASSESSMENT/PLAN:     Diagnoses and Plan:     Ocular melanoma, right (HCC)  Stable; Diagnosed in  2010 and treated by Dr. Cornell at [place].  Continue to follow up yearly.    Pt last saw Dr. Cornell in December of 2023 and will  follow up with him in a year.           Age-related nuclear cataract of both eyes  Discussed with patient that there is a significant cataract in the right eye, but due to poor vision secondary to melanoma will not consider cataract surgery unless Dr. Cornell recommends it.    Patient saw Dr. Cornell in December of 2022 and he did not recommend any treatment on either cataract at that time.   He recommended observation.   Will follow.   Patient says she is not bothered by vision in the left eye; therefore she is not interested in cataract surgery in the left eye.      New glasses today; update as needed.          Floater, vitreous, left   There is no evidence of retinal pathology.  All signs and symptoms of retinal detachment/tears explained in detail.    Patient instructed to call the office if they experience increase in floaters, increase in flashes of light, loss of vision or curtain or veil effect.       No orders of the defined types were placed in this encounter.      Meds This Visit:  Requested Prescriptions      No prescriptions requested or ordered in this encounter        Follow up instructions:  Return in about 1 year (around 7/3/2025) for complete exam.    7/3/2024  Scribed by: Vel Jc MD

## 2024-07-10 ENCOUNTER — OFFICE VISIT (OUTPATIENT)
Dept: FAMILY MEDICINE CLINIC | Facility: CLINIC | Age: 79
End: 2024-07-10
Payer: MEDICARE

## 2024-07-10 VITALS
HEART RATE: 69 BPM | DIASTOLIC BLOOD PRESSURE: 63 MMHG | WEIGHT: 186.63 LBS | OXYGEN SATURATION: 98 % | TEMPERATURE: 98 F | HEIGHT: 63 IN | SYSTOLIC BLOOD PRESSURE: 149 MMHG | BODY MASS INDEX: 33.07 KG/M2

## 2024-07-10 DIAGNOSIS — G63 POLYNEUROPATHY IN OTHER DISEASES CLASSIFIED ELSEWHERE (HCC): ICD-10-CM

## 2024-07-10 DIAGNOSIS — I10 ESSENTIAL HYPERTENSION: ICD-10-CM

## 2024-07-10 DIAGNOSIS — Z98.890 H/O CAROTID ENDARTERECTOMY: Primary | ICD-10-CM

## 2024-07-10 DIAGNOSIS — I77.9 DISORDER OF ARTERIES AND ARTERIOLES (HCC): ICD-10-CM

## 2024-07-10 DIAGNOSIS — E78.5 HYPERLIPIDEMIA, UNSPECIFIED HYPERLIPIDEMIA TYPE: ICD-10-CM

## 2024-07-10 DIAGNOSIS — R55 COLLAPSE: ICD-10-CM

## 2024-07-10 DIAGNOSIS — F32.0 MAJOR DEPRESSIVE DISORDER, SINGLE EPISODE, MILD (HCC): ICD-10-CM

## 2024-07-10 PROCEDURE — G2211 COMPLEX E/M VISIT ADD ON: HCPCS | Performed by: STUDENT IN AN ORGANIZED HEALTH CARE EDUCATION/TRAINING PROGRAM

## 2024-07-10 PROCEDURE — 99214 OFFICE O/P EST MOD 30 MIN: CPT | Performed by: STUDENT IN AN ORGANIZED HEALTH CARE EDUCATION/TRAINING PROGRAM

## 2024-07-10 NOTE — PROGRESS NOTES
HPI:    Patient ID: Valery Casillas is a 79 year old female.    HPI  Pt presenting for follow-up.    H/o HTN  Home BP readings 136/64  Denies any chest pain, palpitations, leg swelling    Endorses fall 6/3  Staying in [place] for granddaughter's wedding  Fell onto Right arm/Right lateral leg  Abrasion to forearm  Notes history of prior drop attacks      Review of Systems   A comprehensive 10 point review of systems was completed.  Pertinent positives and negatives noted in the the HPI.       Current Outpatient Medications   Medication Sig Dispense Refill    amLODIPine 2.5 MG Oral Tab Take 1 tablet (2.5 mg total) by mouth daily. 90 tablet 3    metoprolol succinate ER 25 MG Oral Tablet 24 Hr Take 1 tablet (25 mg total) by mouth daily. 90 tablet 3    triamterene-hydroCHLOROthiazide (DYAZIDE) 37.5-25 MG Oral Cap 1capsule 1 times a week 12 capsule 3    citalopram 10 MG Oral Tab Take 1 tablet (10 mg total) by mouth once daily. 90 tablet 3    pravastatin 40 MG Oral Tab Take 1 tablet (40 mg total) by mouth nightly. 90 tablet 3    albuterol 108 (90 Base) MCG/ACT Inhalation Aero Soln Inhale 2 puffs into the lungs every 4 (four) hours as needed. 18 g 0    Spacer/Aero-Holding Chambers Does not apply Device Use with albuterol inhaler as needed 1 each 0    guaiFENesin 100 MG/5 ML Oral Take 5 mL (100 mg total) by mouth every 4 (four) hours as needed. 180 mL 0    aspirin 325 MG Oral Tab Take 0.5 tablets (162.5 mg total) by mouth daily. 90 tablet 1    Calcium-Phosphorus-Vitamin D (CITRACAL +D3 OR) Take 1 tablet by mouth daily.      Vitamin B-12 (VITAMIN B12) 1000 MCG Oral Tab Take 1 tablet (1,000 mcg total) by mouth daily.      loratadine (CLARITIN) 10 MG Oral Tab Take 1 tablet (10 mg total) by mouth daily.      folic acid (FOLVITE) 400 MCG Oral Tab Take 1 tablet (400 mcg total) by mouth daily.      Acetaminophen (TYLENOL EXTRA STRENGTH OR) Take  by mouth.      Multiple Vitamins-Minerals (CENTRUM SILVER) Oral Tab Take 1 tablet by  mouth daily.         Allergies:  Allergies   Allergen Reactions    Adhesive Tape RASH     Pulled the hair off      Vitals:    07/10/24 0933   BP: 149/63   Pulse: 69   Temp: 98.1 °F (36.7 °C)   TempSrc: Temporal   SpO2: 98%   Weight: 186 lb 9.6 oz (84.6 kg)   Height: 5' 3\" (1.6 m)       Body mass index is 33.05 kg/m².   PHYSICAL EXAM:   Physical Exam  Vitals reviewed.   Constitutional:       General: She is not in acute distress.     Appearance: Normal appearance. She is well-developed.   HENT:      Head: Normocephalic and atraumatic.      Right Ear: External ear normal.      Left Ear: External ear normal.   Eyes:      Conjunctiva/sclera: Conjunctivae normal.   Neck:      Thyroid: No thyroid mass or thyroid tenderness.   Cardiovascular:      Rate and Rhythm: Normal rate and regular rhythm.      Pulses: Normal pulses.      Heart sounds: Normal heart sounds, S1 normal and S2 normal. No murmur heard.  Pulmonary:      Effort: Pulmonary effort is normal. No respiratory distress.      Breath sounds: Normal breath sounds. No wheezing, rhonchi or rales.   Abdominal:      General: Bowel sounds are normal.      Palpations: Abdomen is soft.      Tenderness: There is no abdominal tenderness. There is no guarding or rebound.   Musculoskeletal:      Cervical back: Normal range of motion and neck supple. No muscular tenderness.      Right lower leg: No edema.      Left lower leg: No edema.   Lymphadenopathy:      Cervical: No cervical adenopathy.   Skin:     General: Skin is warm and dry.      Coloration: Skin is not jaundiced.   Neurological:      General: No focal deficit present.      Mental Status: She is alert and oriented to person, place, and time. Mental status is at baseline.   Psychiatric:         Attention and Perception: Attention normal.         Mood and Affect: Mood normal.         Behavior: Behavior normal. Behavior is cooperative.         Cognition and Memory: Cognition normal.             ASSESSMENT/PLAN:   1. H/O  carotid endarterectomy  Will check Carotid US for surveillance  Continue statin dosing  - discussed red flags for urgent reevaluation  - to call with any questions/concerns  - US CAROTID DOPPLER BILAT - DIAG IMG (CPT=93880); Future    2. Collapse  As above  - US CAROTID DOPPLER BILAT - DIAG IMG (CPT=93880); Future    3. Essential hypertension  In-office BP borderline, pt otherwise asymptomatic  - discussed benefits of DASH diet and daily activity  - continue BP monitoring  - continue daily medication -- consider increasing to 5mg if consistently above 140/90  - will check labwork  - advised to call if BP is persistently elevated  - TSH W Reflex To Free T4; Future  - Urinalysis, Routine; Future    4. Hyperlipidemia, unspecified hyperlipidemia type  - discussed healthy diet and lifestyle changes for overall wellness, which includes decreased carb and sugar intake, increased fiber intake, and increased water intake as tolerated, as well as regular exercise  - continue statin dosing  - Lipid Panel; Future  - Hepatic Function Panel (7) [E]; Future    5. Polyneuropathy in other diseases classified elsewhere (HCC)  Stable, CTM    6. Major depressive disorder, single episode, mild (HCC)  Stable on Celexa  Denies SH/SI/HI    7. Disorder of arteries and arterioles (HCC)  Stable  Continue ASA, statin  Will check US as above    Pt verbalized understanding and agrees with plan.    Orders Placed This Encounter   Procedures    TSH W Reflex To Free T4    Lipid Panel    Hepatic Function Panel (7) [E]    Urinalysis, Routine       Meds This Visit:  Requested Prescriptions      No prescriptions requested or ordered in this encounter       Imaging & Referrals:  US CAROTID DOPPLER BILAT - DIAG IM (CPT=93880)         ID#[id number]

## 2024-08-08 ENCOUNTER — OFFICE VISIT (OUTPATIENT)
Dept: PODIATRY CLINIC | Facility: CLINIC | Age: 79
End: 2024-08-08
Payer: MEDICARE

## 2024-08-08 ENCOUNTER — TELEPHONE (OUTPATIENT)
Dept: PODIATRY CLINIC | Facility: CLINIC | Age: 79
End: 2024-08-08

## 2024-08-08 DIAGNOSIS — M79.675 TOE PAIN, BILATERAL: ICD-10-CM

## 2024-08-08 DIAGNOSIS — M79.674 PAIN DUE TO ONYCHOMYCOSIS OF TOENAIL OF RIGHT FOOT: ICD-10-CM

## 2024-08-08 DIAGNOSIS — M79.674 TOE PAIN, BILATERAL: ICD-10-CM

## 2024-08-08 DIAGNOSIS — B35.1 PAIN DUE TO ONYCHOMYCOSIS OF TOENAIL OF RIGHT FOOT: ICD-10-CM

## 2024-08-08 DIAGNOSIS — B35.1 ONYCHOMYCOSIS: Primary | ICD-10-CM

## 2024-08-08 PROCEDURE — 99213 OFFICE O/P EST LOW 20 MIN: CPT | Performed by: STUDENT IN AN ORGANIZED HEALTH CARE EDUCATION/TRAINING PROGRAM

## 2024-08-08 NOTE — PROGRESS NOTES
[facility]  Progress Note      Valery Casillas is a 79 year old female.   Chief Complaint   Patient presents with    Toenail Care     Pt here for nail trim and foot check. R hallux discoloration. No pain  Former pt of , 04/29.        HPI:     Patient is an 79 -year-old female patient who presents to clinic for bilateral foot evaluation.  Admits to right hallux being thickened and discolored and painful at times with pressure.  Today she admits to toenails that she is unable to trim herself.      Allergies: Adhesive tape    Current Outpatient Medications   Medication Sig Dispense Refill    amLODIPine 2.5 MG Oral Tab Take 1 tablet (2.5 mg total) by mouth daily. 90 tablet 3    metoprolol succinate ER 25 MG Oral Tablet 24 Hr Take 1 tablet (25 mg total) by mouth daily. 90 tablet 3    triamterene-hydroCHLOROthiazide (DYAZIDE) 37.5-25 MG Oral Cap 1capsule 1 times a week 12 capsule 3    citalopram 10 MG Oral Tab Take 1 tablet (10 mg total) by mouth once daily. 90 tablet 3    pravastatin 40 MG Oral Tab Take 1 tablet (40 mg total) by mouth nightly. 90 tablet 3    albuterol 108 (90 Base) MCG/ACT Inhalation Aero Soln Inhale 2 puffs into the lungs every 4 (four) hours as needed. 18 g 0    Spacer/Aero-Holding Chambers Does not apply Device Use with albuterol inhaler as needed 1 each 0    guaiFENesin 100 MG/5 ML Oral Take 5 mL (100 mg total) by mouth every 4 (four) hours as needed. 180 mL 0    aspirin 325 MG Oral Tab Take 0.5 tablets (162.5 mg total) by mouth daily. 90 tablet 1    Calcium-Phosphorus-Vitamin D (CITRACAL +D3 OR) Take 1 tablet by mouth daily.      Vitamin B-12 (VITAMIN B12) 1000 MCG Oral Tab Take 1 tablet (1,000 mcg total) by mouth daily.      loratadine (CLARITIN) 10 MG Oral Tab Take 1 tablet (10 mg total) by mouth daily.      folic acid (FOLVITE) 400 MCG Oral Tab Take 1 tablet (400 mcg total) by mouth daily.      Acetaminophen (TYLENOL EXTRA STRENGTH OR) Take  by mouth.      Multiple  Vitamins-Minerals (CENTRUM SILVER) Oral Tab Take 1 tablet by mouth daily.          Past Medical History:    Allergic rhinitis    Anxiety state, unspecified    Carotid artery disease (HCC)    Cataracts, both eyes    OU    Depression    Kidney disease, chronic, stage III (GFR 30-59 ml/min) ([facility])    Melanoma, choroid, right eye (HCC)    OD, Radiation plaque for 7 days; per NG    Other and unspecified hyperlipidemia    Retinal scar of right eye    \"Retinal scarring due to melanoma/ radiation\"    Unspecified essential hypertension      Past Surgical History:   Procedure Laterality Date    Cholecystectomy      Colonoscopy      Hysterectomy      Other surgical history      Melanoma of Choroid: Radiation plaque for 7 days; treated      Family History   Problem Relation Age of Onset    Heart Attack Father 54        MI; per NG    Stroke Mother 78        per NG    Breast Cancer Mother 76        per NG    Heart Disorder Mother         CHF; per NG    Hypertension Mother         per NG    Stroke Maternal Grandmother 69        Cause of death; per NG    Diabetes Other         Aunt; per NG    Lipids Other         Family H/o Hyperlipidemia; per NG    Hypertension Maternal Uncle         per NG    Breast Cancer Paternal Aunt 70    Glaucoma Neg     Macular degeneration Neg       Social History     Socioeconomic History    Marital status:    Tobacco Use    Smoking status: Former     Current packs/day: 0.00     Average packs/day: 0.5 packs/day for 14.0 years (7.0 ttl pk-yrs)     Types: Cigarettes     Start date: 1965     Quit date: 1979     Years since quittin.6    Smokeless tobacco: Never   Vaping Use    Vaping status: Never Used   Substance and Sexual Activity    Alcohol use: No     Alcohol/week: 0.0 standard drinks of alcohol    Drug use: No    Sexual activity: Not Currently     Partners: Male   Other Topics Concern    Caffeine Concern Yes     Comment: Coffee, 2 cups; per NG    Pt has a pacemaker No     Pt has a defibrillator No    Reaction to local anesthetic No           REVIEW OF SYSTEMS:     Denies nause, fever, chills  No calf pain  Denies chest pain or SOB      EXAM:   LMP  (LMP Unknown)   GENERAL: well developed, well nourished, in no apparent distress  EXTREMITIES:   1. Integument: Normal skin temperature and turgor. Toenails x10 are elongated, thickened and discolored with subungal derbi.     2. Vascular: Dorsalis pedis two out of four bilateral and posterior tibial pulses two out of   four bilateral, capillary refill normal.   3. Musculoskeletal: All muscle groups are graded 5 out of 5 in the foot and ankle.   4. Neurological: Normal sharp dull sensation; reflexes normal.             ASSESSMENT AND PLAN:   Diagnoses and all orders for this visit:    Onychomycosis    Toe pain, bilateral    Pain due to onychomycosis of toenail of right foot        Plan:         -Patient examined, chart history reviewed.  -Discussed importance of proper pedal hygiene, regular foot checks, and tight glucose control.  -Sharply debrided nails x10 with a sterile nail nipper achieving a 20% reduction in thickness and length, without incident.   -Ambulate with supportive shoes and inserts and avoid walking barefoot.  -Educated patient on acute signs of infection advised patient to seek immediate medical attention if symptoms arise.      The patient indicates understanding of these issues and agrees to the plan.        Sydnee Galvez DPM

## 2024-08-08 NOTE — TELEPHONE ENCOUNTER
Patient was advised to schedule a 20 minute appointment for a toenail removal. Patient is scheduled for 12/19 but would like to come sooner if possible. Please advise. Patient aware doctor is going on maternity leave.

## 2024-08-08 NOTE — TELEPHONE ENCOUNTER
Is this someone I can schedule with Dr. Purcell for this procedure? Or should I advise her to call back when we may have new podiatrist? You have nothing available

## 2024-08-09 NOTE — TELEPHONE ENCOUNTER
Called patient and advised no further openings for Dr coffman until her leave in September. Offered appointments with other podiatrists at [place] and [place] but she declined. Did inform her that a new podiatrist is starting mid October and if she wants to wait that long she should call back in a month to check the schedule. She verbalized understanding.

## 2024-08-13 ENCOUNTER — HOSPITAL ENCOUNTER (OUTPATIENT)
Dept: ULTRASOUND IMAGING | Facility: HOSPITAL | Age: 79
Discharge: HOME | End: 2024-08-13
Attending: STUDENT IN AN ORGANIZED HEALTH CARE EDUCATION/TRAINING PROGRAM
Payer: MEDICARE

## 2024-08-13 DIAGNOSIS — R55 COLLAPSE: ICD-10-CM

## 2024-08-13 DIAGNOSIS — Z98.890 H/O CAROTID ENDARTERECTOMY: ICD-10-CM

## 2024-08-13 PROCEDURE — 93880 EXTRACRANIAL BILAT STUDY: CPT | Performed by: STUDENT IN AN ORGANIZED HEALTH CARE EDUCATION/TRAINING PROGRAM

## 2024-09-04 ENCOUNTER — LAB ENCOUNTER (OUTPATIENT)
Dept: LAB | Facility: HOSPITAL | Age: 79
End: 2024-09-04
Attending: STUDENT IN AN ORGANIZED HEALTH CARE EDUCATION/TRAINING PROGRAM
Payer: MEDICARE

## 2024-09-04 DIAGNOSIS — E78.5 HYPERLIPIDEMIA, UNSPECIFIED HYPERLIPIDEMIA TYPE: ICD-10-CM

## 2024-09-04 DIAGNOSIS — N18.32 STAGE 3B CHRONIC KIDNEY DISEASE (HCC): ICD-10-CM

## 2024-09-04 DIAGNOSIS — I10 ESSENTIAL HYPERTENSION: ICD-10-CM

## 2024-09-04 LAB
ALBUMIN SERPL-MCNC: 4.5 G/DL (ref 3.2–4.8)
ALBUMIN SERPL-MCNC: 4.5 G/DL (ref 3.2–4.8)
ALP LIVER SERPL-CCNC: 84 U/L
ALT SERPL-CCNC: 18 U/L
ANION GAP SERPL CALC-SCNC: 10 MMOL/L (ref 0–18)
AST SERPL-CCNC: 35 U/L (ref ?–34)
BASOPHILS # BLD AUTO: 0.04 X10(3) UL (ref 0–0.2)
BASOPHILS NFR BLD AUTO: 0.7 %
BILIRUB DIRECT SERPL-MCNC: 0.2 MG/DL (ref ?–0.3)
BILIRUB SERPL-MCNC: 0.5 MG/DL (ref 0.2–1.1)
BILIRUB UR QL: NEGATIVE
BUN BLD-MCNC: 28 MG/DL (ref 9–23)
BUN/CREAT SERPL: 17.8 (ref 10–20)
CALCIUM BLD-MCNC: 9.5 MG/DL (ref 8.7–10.4)
CHLORIDE SERPL-SCNC: 110 MMOL/L (ref 98–112)
CHOLEST SERPL-MCNC: 191 MG/DL (ref ?–200)
CLARITY UR: CLEAR
CO2 SERPL-SCNC: 26 MMOL/L (ref 21–32)
CREAT BLD-MCNC: 1.57 MG/DL
DEPRECATED RDW RBC AUTO: 41 FL (ref 35.1–46.3)
EGFRCR SERPLBLD CKD-EPI 2021: 33 ML/MIN/1.73M2 (ref 60–?)
EOSINOPHIL # BLD AUTO: 0.39 X10(3) UL (ref 0–0.7)
EOSINOPHIL NFR BLD AUTO: 6.5 %
ERYTHROCYTE [DISTWIDTH] IN BLOOD BY AUTOMATED COUNT: 12.4 % (ref 11–15)
FASTING PATIENT LIPID ANSWER: YES
GLUCOSE BLD-MCNC: 97 MG/DL (ref 70–99)
GLUCOSE UR-MCNC: NORMAL MG/DL
HCT VFR BLD AUTO: 40 %
HDLC SERPL-MCNC: 57 MG/DL (ref 40–59)
HGB BLD-MCNC: 13.3 G/DL
HGB UR QL STRIP.AUTO: NEGATIVE
IMM GRANULOCYTES # BLD AUTO: 0.01 X10(3) UL (ref 0–1)
IMM GRANULOCYTES NFR BLD: 0.2 %
KETONES UR-MCNC: NEGATIVE MG/DL
LDLC SERPL CALC-MCNC: 111 MG/DL (ref ?–100)
LEUKOCYTE ESTERASE UR QL STRIP.AUTO: NEGATIVE
LYMPHOCYTES # BLD AUTO: 1.62 X10(3) UL (ref 1–4)
LYMPHOCYTES NFR BLD AUTO: 27.1 %
MCH RBC QN AUTO: 30.4 PG (ref 26–34)
MCHC RBC AUTO-ENTMCNC: 33.3 G/DL (ref 31–37)
MCV RBC AUTO: 91.3 FL
MONOCYTES # BLD AUTO: 0.48 X10(3) UL (ref 0.1–1)
MONOCYTES NFR BLD AUTO: 8 %
NEUTROPHILS # BLD AUTO: 3.43 X10 (3) UL (ref 1.5–7.7)
NEUTROPHILS # BLD AUTO: 3.43 X10(3) UL (ref 1.5–7.7)
NEUTROPHILS NFR BLD AUTO: 57.5 %
NITRITE UR QL STRIP.AUTO: NEGATIVE
NONHDLC SERPL-MCNC: 134 MG/DL (ref ?–130)
OSMOLALITY SERPL CALC.SUM OF ELEC: 307 MOSM/KG (ref 275–295)
PH UR: 6.5 [PH] (ref 5–8)
PHOSPHATE SERPL-MCNC: 3.9 MG/DL (ref 2.4–5.1)
PLATELET # BLD AUTO: 343 10(3)UL (ref 150–450)
POTASSIUM SERPL-SCNC: 4.2 MMOL/L (ref 3.5–5.1)
PROT SERPL-MCNC: 7.9 G/DL (ref 5.7–8.2)
PROT UR-MCNC: NEGATIVE MG/DL
RBC # BLD AUTO: 4.38 X10(6)UL
SODIUM SERPL-SCNC: 146 MMOL/L (ref 136–145)
SP GR UR STRIP: 1.01 (ref 1–1.03)
TRIGL SERPL-MCNC: 132 MG/DL (ref 30–149)
TSI SER-ACNC: 0.93 MIU/ML (ref 0.55–4.78)
UROBILINOGEN UR STRIP-ACNC: NORMAL
VLDLC SERPL CALC-MCNC: 23 MG/DL (ref 0–30)
WBC # BLD AUTO: 6 X10(3) UL (ref 4–11)

## 2024-09-04 PROCEDURE — 84443 ASSAY THYROID STIM HORMONE: CPT

## 2024-09-04 PROCEDURE — 80076 HEPATIC FUNCTION PANEL: CPT

## 2024-09-04 PROCEDURE — 85025 COMPLETE CBC W/AUTO DIFF WBC: CPT

## 2024-09-04 PROCEDURE — 81003 URINALYSIS AUTO W/O SCOPE: CPT

## 2024-09-04 PROCEDURE — 80069 RENAL FUNCTION PANEL: CPT

## 2024-09-04 PROCEDURE — 80061 LIPID PANEL: CPT

## 2024-09-04 PROCEDURE — 36415 COLL VENOUS BLD VENIPUNCTURE: CPT

## 2024-09-10 ENCOUNTER — OFFICE VISIT (OUTPATIENT)
Dept: NEPHROLOGY | Facility: CLINIC | Age: 79
End: 2024-09-10
Payer: MEDICARE

## 2024-09-10 VITALS
SYSTOLIC BLOOD PRESSURE: 112 MMHG | DIASTOLIC BLOOD PRESSURE: 50 MMHG | HEIGHT: 63 IN | BODY MASS INDEX: 32.43 KG/M2 | HEART RATE: 64 BPM | WEIGHT: 183 LBS

## 2024-09-10 DIAGNOSIS — N18.32 STAGE 3B CHRONIC KIDNEY DISEASE (HCC): Primary | ICD-10-CM

## 2024-09-10 PROCEDURE — 99214 OFFICE O/P EST MOD 30 MIN: CPT | Performed by: INTERNAL MEDICINE

## 2024-09-11 NOTE — PROGRESS NOTES
09/10/24        Patient: Valery Casillas   YOB: 1945   Date of Visit: 9/10/2024       Dear  Dr. Thom MD,      Thank you for referring Valery Casillas to my practice.  Please find my assessment and plan below.      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  Overall the patient states has been doing well without any chest pain, shortness of breath, GI or urinary tract symptoms.  She brought in her blood pressure readings which she does take daily.  Overall they are under very good control with systolics in the 120s and 130s and diastolics in the 60s and heart rates in the 60s.  She has been seeing podiatry for what sounds to be a dead nail.  It is asymptomatic.  May need to have it removed.  Also had an episode where she fell to the ground at a hotel in June of this year.  Not clear if this was a syncopal episode or not.  Symptoms have not reoccurred.    On physical exam her blood pressure is 112/50 with a pulse of 64 and she weighed 183 pounds.  Her neck was supple without JVD.  Lungs were clear.  Heart revealed a regular rate and rhythm with an S4 but no gallops, murmurs or rubs.  Abdomen was soft, flat, nontender without organomegaly, masses or bruits.  Extremities revealed no edema.    I reviewed her most recent labs done on September 4, 2024.  Creatinine is crept up to 1.57.  Was 1.38 back in June or 2024.  GFR has dropped down to 33.  Sodium was 146.  The patient states that she needs to do better with her fluid intake.  Hypernatremia suggest that is so.  She will try to drink more water.  Otherwise blood pressures under good control.  Avoid nonsteroidals.  Will repeat a CBC and renal panel in 1 month to see if her numbers improved.  If better we will continue quarterly.  I will see her  again in 6 months for follow-up or sooner if clinically indicated.    Thank you again for allowing me to participate in the care of your patient.  If you have any questions please feel free to call               Sincerely,   Godwin Campa MD   [facility], [facility], [place]  [address]  [place] [address]    Document electronically generated by:  Godwin Campa MD

## 2024-09-13 ENCOUNTER — OFFICE VISIT (OUTPATIENT)
Dept: OTOLARYNGOLOGY | Facility: CLINIC | Age: 79
End: 2024-09-13
Payer: MEDICARE

## 2024-09-13 DIAGNOSIS — H61.23 BILATERAL IMPACTED CERUMEN: Primary | ICD-10-CM

## 2024-09-13 PROCEDURE — 69210 REMOVE IMPACTED EAR WAX UNI: CPT | Performed by: OTOLARYNGOLOGY

## 2024-09-13 NOTE — PROGRESS NOTES
Valery Casillas is a 79 year old female.    Chief Complaint   Patient presents with    Ear Problem     Bilateral impacted cerumen       HISTORY OF PRESENT ILLNESS    Patient presents for cerumen removal. No other complaints or concerns at this time    Social History     Socioeconomic History    Marital status:    Tobacco Use    Smoking status: Former     Current packs/day: 0.00     Average packs/day: 0.5 packs/day for 14.0 years (7.0 ttl pk-yrs)     Types: Cigarettes     Start date: 1965     Quit date: 1979     Years since quittin.7    Smokeless tobacco: Never   Vaping Use    Vaping status: Never Used   Substance and Sexual Activity    Alcohol use: No     Alcohol/week: 0.0 standard drinks of alcohol    Drug use: No    Sexual activity: Not Currently     Partners: Male   Other Topics Concern    Caffeine Concern Yes     Comment: Coffee, 2 cups; per NG    Pt has a pacemaker No    Pt has a defibrillator No    Reaction to local anesthetic No       Family History   Problem Relation Age of Onset    Heart Attack Father 54        MI; per NG    Stroke Mother 78        per NG    Breast Cancer Mother 76        per NG    Heart Disorder Mother         CHF; per NG    Hypertension Mother         per NG    Stroke Maternal Grandmother 69        Cause of death; per NG    Diabetes Other         Aunt; per NG    Lipids Other         Family H/o Hyperlipidemia; per NG    Hypertension Maternal Uncle         per NG    Breast Cancer Paternal Aunt 70    Glaucoma Neg     Macular degeneration Neg        Past Medical History:    Allergic rhinitis    Anxiety state, unspecified    Carotid artery disease (HCC)    Cataracts, both eyes    OU    Depression    Kidney disease, chronic, stage III (GFR 30-59 ml/min) (HCC)    Melanoma, choroid, right eye (HCC)    OD, Radiation plaque for 7 days; per NG    Other and unspecified hyperlipidemia    Retinal scar of right eye    \"Retinal scarring due to melanoma/ radiation\"    Unspecified  essential hypertension       Past Surgical History:   Procedure Laterality Date    Cholecystectomy  1996    Colonoscopy      Hysterectomy  1992    Other surgical history  2010    Melanoma of Choroid: Radiation plaque for 7 days; treated       REVIEW OF SYSTEMS    System Neg/Pos Details   Constitutional Negative Fatigue, fever and weight loss.   ENMT Negative Drooling.   Eyes Negative Blurred vision and vision changes.   Respiratory Negative Dyspnea and wheezing.   Cardio Negative Chest pain, irregular heartbeat/palpitations and syncope.   GI Negative Abdominal pain and diarrhea.   Endocrine Negative Cold intolerance and heat intolerance.   Neuro Negative Tremors.   Psych Negative Anxiety and depression.   Integumentary Negative Frequent skin infections, pigment change and rash.   Hema/Lymph Negative Easy bleeding and easy bruising.           PHYSICAL EXAM    LMP  (LMP Unknown)        Constitutional Normal Overall appearance - Normal.        Neck Exam Normal Inspection - Normal. Palpation - Normal. Parotid gland - Normal. Thyroid gland - Normal.             Head/Face Normal Facial features - Normal. Eyebrows - Normal. Skull - Normal.             Ears Normal Inspection - Right: Normal, Left: Normal. Canal - Right: Normal, Left: Normal. TM - Right: Normal, Left: Normal.   Skin Normal Inspection - Normal.                              Canals:  Right: Canal reveals cerumen impaction,   Left: Canal reveals cerumen impaction,     Tympanic Membranes:  Right: Normal tympanic membrane.   Left: Normal tympanic membrane.     TM Visualized Method:   Right TM examined via otomicroscopy.    Left TM examined via otomicroscopy.      PROCEDURE:    Removal of cerumen impaction   The cerumen impaction was completely removed using microscopy.   Removal was completed by using acurette and/or suction.   Comments: Return to clinic as needed.  Avoid q-tips, water precautions and use over the counter wax remedies as needed.      Current  Outpatient Medications:     amLODIPine 2.5 MG Oral Tab, Take 1 tablet (2.5 mg total) by mouth daily., Disp: 90 tablet, Rfl: 3    metoprolol succinate ER 25 MG Oral Tablet 24 Hr, Take 1 tablet (25 mg total) by mouth daily., Disp: 90 tablet, Rfl: 3    triamterene-hydroCHLOROthiazide (DYAZIDE) 37.5-25 MG Oral Cap, 1capsule 1 times a week, Disp: 12 capsule, Rfl: 3    citalopram 10 MG Oral Tab, Take 1 tablet (10 mg total) by mouth once daily., Disp: 90 tablet, Rfl: 3    pravastatin 40 MG Oral Tab, Take 1 tablet (40 mg total) by mouth nightly., Disp: 90 tablet, Rfl: 3    guaiFENesin 100 MG/5 ML Oral, Take 5 mL (100 mg total) by mouth every 4 (four) hours as needed., Disp: 180 mL, Rfl: 0    aspirin 325 MG Oral Tab, Take 0.5 tablets (162.5 mg total) by mouth daily., Disp: 90 tablet, Rfl: 1    Calcium-Phosphorus-Vitamin D (CITRACAL +D3 OR), Take 1 tablet by mouth daily., Disp: , Rfl:     Vitamin B-12 (VITAMIN B12) 1000 MCG Oral Tab, Take 1 tablet (1,000 mcg total) by mouth daily., Disp: , Rfl:     loratadine (CLARITIN) 10 MG Oral Tab, Take 1 tablet (10 mg total) by mouth daily., Disp: , Rfl:     folic acid (FOLVITE) 400 MCG Oral Tab, Take 1 tablet (400 mcg total) by mouth daily., Disp: , Rfl:     Acetaminophen (TYLENOL EXTRA STRENGTH OR), Take  by mouth., Disp: , Rfl:     Multiple Vitamins-Minerals (CENTRUM SILVER) Oral Tab, Take 1 tablet by mouth daily.  , Disp: , Rfl:   ASSESSMENT AND PLAN    1. Bilateral impacted cerumen    - REMOVAL IMPACTED CERUMEN REQUIRING INSTRUMENTATION, UNILATERAL      All cerumen was removed using microscopy. I have asked the patient to return to see me as needed for repeat cerumen removal in the future.      King Jacobs MD    9/13/2024    9:56 AM

## 2024-09-17 ENCOUNTER — NURSE TRIAGE (OUTPATIENT)
Dept: INTERNAL MEDICINE CLINIC | Facility: CLINIC | Age: 79
End: 2024-09-17
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
## 2024-09-17 DIAGNOSIS — R26.89 POOR BALANCE: ICD-10-CM

## 2024-09-17 NOTE — TELEPHONE ENCOUNTER
Action Requested: Summary for Provider     []  Critical Lab, Recommendations Needed  [x] Need Additional Advice  []   FYI    []   Need Orders  [] Need Medications Sent to Pharmacy  []  Other     SUMMARY: Disposition per protocol is to be seen in office within 2 weeks.  Patient declines to schedule appointment because Dr Tomas told her to call office to talk about next steps if she fell again.    Dr Tomas, please advise.      Reason for call: Fall  Onset: 9-13-24     Patient calling,verified name and date of birth., verified name and date of birth. She had a  second fall since June on 9-13-24, was bending over to move a stool and fell over onto the floor on her knees. Denies  loss of consciousness or hitting her head. No bruises. Her right arm is sore  with normal range of motion and her left knee is tender to the touch below the knee cap. She got up unaided. Reviewed care advice including change positions slowly, keep floor clear of obstacles, call back if you fall again or if discomfort worsens. Patient verbalizes understanding         Reason for Disposition   Falling (two or more falls) and in past year    Protocols used: Falls and [id number]-[id number]

## 2024-09-18 ENCOUNTER — TELEPHONE (OUTPATIENT)
Dept: FAMILY MEDICINE CLINIC | Facility: CLINIC | Age: 79
End: 2024-09-18

## 2024-09-18 NOTE — TELEPHONE ENCOUNTER
She might benefit from physical therapy to work on balance/strengthening.  In absence of preceding/associated dizziness, would start that for now. Please inquire whether she would prefer [facility] vs Home Health option.  Recent Carotid US stable.

## 2024-09-18 NOTE — TELEPHONE ENCOUNTER
Order for Physical therapy faxed/confirmed to Aaron Irizarry Physical Therapist for [facility] f:[phone]. See triage encounter.

## 2024-09-18 NOTE — TELEPHONE ENCOUNTER
Patient was left a message that Order for PT has been faxed to Aaron Irizarry.   Fax Confirmation received.    If any questions, call back.

## 2024-09-18 NOTE — TELEPHONE ENCOUNTER
Dr Tomas: pended order; please fill out as appropriate.     Patient is a resident [facility] in [place], [place]   Patient doesn't drive. She would prefer at home PT.    Patient did mention her facility offers in home PT, her name is Aaron Irizarry.   #[phone], [phone].    I spoke to Aaron Irizarry, PT, and confirmed she does see residents at facility and she would need is a PT order.

## 2024-09-18 NOTE — TELEPHONE ENCOUNTER
Pt called back, relayed message, verbalized understanding, asking will Ins pay for PT, advised -to call Ins and asked for for further advise, Status -open for order

## 2024-09-25 ENCOUNTER — TELEPHONE (OUTPATIENT)
Dept: FAMILY MEDICINE CLINIC | Facility: CLINIC | Age: 79
End: 2024-09-25

## 2024-09-27 NOTE — PROGRESS NOTES
The pathology report from last visit showed  left breast  Compound lentiginous nevus. Please log in test results, send biopsy results letter. Pt to rtc 1 year or prn. difficulty with your balance especially while walking.  \"           Difficulty focusing or blurred vision.  You may not be aware of slight changes in your behavior and/or your reaction time because of the medication used during the procedure. Therefore you should follow these instructions.  \"           Have someone responsible help you with your care.  \"           Do not drive for 24 hours.  \"           Do not operate equipment for 24 hours (lawnmowers, power tools, kitchen accessories, stove).  \"           Do not drink any alcoholic beverages for a minimum of 24 hours.  \"           Do not make important personal, legal or business decisions for 24 hours.  \"           You may experience dizziness or lightheadedness. Move slowly and carefully, do not make sudden position changes.  \"           Drink extra amounts of fluids today.  \"           Increase your diet as tolerated (unless you have received specific instructions from your doctor).  \"           If you feel nauseated, continue with liquids until the nausea is gone.  \"           Notify your physician if you have not urinated within 8 hours after the procedure.  \"           Resume your medications unless otherwise instructed.     Contact your physician if you have any questions or concerns.     IF YOU REPORT TO AN EMERGENCY ROOM, DOCTOR'S OFFICE OR HOSPITAL WITHIN 24 HOURS AFTER YOUR PROCEDURE, BRING THIS SHEET AND YOUR AFTER VISIT SUMMARY WITH YOU AND GIVE IT TO THE PHYSICIAN OR NURSE ATTENDING YOU.

## 2024-09-28 ENCOUNTER — HOSPITAL ENCOUNTER (OUTPATIENT)
Dept: GENERAL RADIOLOGY | Facility: HOSPITAL | Age: 79
Discharge: HOME | End: 2024-09-28
Attending: STUDENT IN AN ORGANIZED HEALTH CARE EDUCATION/TRAINING PROGRAM
Payer: MEDICARE
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
## 2024-09-28 PROCEDURE — 73090 X-RAY EXAM OF FOREARM: CPT | Performed by: STUDENT IN AN ORGANIZED HEALTH CARE EDUCATION/TRAINING PROGRAM

## 2024-10-03 ENCOUNTER — TELEPHONE (OUTPATIENT)
Dept: FAMILY MEDICINE CLINIC | Facility: CLINIC | Age: 79
End: 2024-10-03

## 2024-10-03 NOTE — TELEPHONE ENCOUNTER
Patient contacted and made aware of Dr. Tomas's recommendation. I assisted with scheduling advised visit. She is asking if she should continue the tramadol. I made her aware I will convey the above to Dr. Tomas. Patient verbalized understanding. No further questions or concerns at this time.    Dr. Tomas - please advise    Future Appointments   Date Time Provider Department Center   10/5/2024  9:20 AM Jelly Kohler APRN [facility]

## 2024-10-03 NOTE — TELEPHONE ENCOUNTER
patient called and she stated that she is still having pain on her right arm. The pain is less but it is still there. Patient is taking tramadol 25 mg twice a day.   Since yesterday patient started to develop a muscle stiffness on the upper part of her left hip. Patient read that this can be a side effect of tramadol. Patient is not sure if this muscle stiffness she is feeling on her left upper hip is being caused by the tramadol or if it is because she is favoring her left side to sleep. Patient will like to know what should she do?      Patient was also given her test result message below.    Forearm XR shows no acute changes or fracture. You do have a small chronic calcification in one of the tendons that may be inflamed due to the fall and thereby causing pain. Please contact us with any questions.   Written by Cornelia Tomas MD on 9/30/2024  8:14 AM CDT  Seen by patient Valery JACKSON Vinny on 10/2/2024  6:11 PM

## 2024-10-05 ENCOUNTER — LAB ENCOUNTER (OUTPATIENT)
Dept: LAB | Facility: HOSPITAL | Age: 79
End: 2024-10-05
Attending: INTERNAL MEDICINE
Payer: MEDICARE

## 2024-10-05 ENCOUNTER — TELEPHONE (OUTPATIENT)
Dept: NEPHROLOGY | Facility: CLINIC | Age: 79
End: 2024-10-05

## 2024-10-05 ENCOUNTER — OFFICE VISIT (OUTPATIENT)
Dept: FAMILY MEDICINE CLINIC | Facility: CLINIC | Age: 79
End: 2024-10-05
Payer: MEDICARE

## 2024-10-05 VITALS
OXYGEN SATURATION: 97 % | DIASTOLIC BLOOD PRESSURE: 65 MMHG | SYSTOLIC BLOOD PRESSURE: 119 MMHG | BODY MASS INDEX: 31.89 KG/M2 | HEIGHT: 63 IN | WEIGHT: 180 LBS | TEMPERATURE: 98 F | HEART RATE: 71 BPM | RESPIRATION RATE: 18 BRPM

## 2024-10-05 DIAGNOSIS — Z91.81 HISTORY OF FALL: ICD-10-CM

## 2024-10-05 DIAGNOSIS — N18.32 STAGE 3B CHRONIC KIDNEY DISEASE (HCC): ICD-10-CM

## 2024-10-05 DIAGNOSIS — M79.631 RIGHT FOREARM PAIN: ICD-10-CM

## 2024-10-05 DIAGNOSIS — M25.552 LEFT HIP PAIN: Primary | ICD-10-CM

## 2024-10-05 LAB
ALBUMIN SERPL-MCNC: 4.6 G/DL (ref 3.2–4.8)
ANION GAP SERPL CALC-SCNC: 7 MMOL/L (ref 0–18)
BASOPHILS # BLD AUTO: 0.05 X10(3) UL (ref 0–0.2)
BASOPHILS NFR BLD AUTO: 1 %
BUN BLD-MCNC: 22 MG/DL (ref 9–23)
BUN/CREAT SERPL: 12.2 (ref 10–20)
CALCIUM BLD-MCNC: 10.2 MG/DL (ref 8.7–10.4)
CHLORIDE SERPL-SCNC: 105 MMOL/L (ref 98–112)
CO2 SERPL-SCNC: 30 MMOL/L (ref 21–32)
CREAT BLD-MCNC: 1.81 MG/DL
DEPRECATED RDW RBC AUTO: 42.7 FL (ref 35.1–46.3)
EGFRCR SERPLBLD CKD-EPI 2021: 28 ML/MIN/1.73M2 (ref 60–?)
EOSINOPHIL # BLD AUTO: 0.31 X10(3) UL (ref 0–0.7)
EOSINOPHIL NFR BLD AUTO: 5.9 %
ERYTHROCYTE [DISTWIDTH] IN BLOOD BY AUTOMATED COUNT: 12.3 % (ref 11–15)
GLUCOSE BLD-MCNC: 105 MG/DL (ref 70–99)
HCT VFR BLD AUTO: 41.2 %
HGB BLD-MCNC: 12.8 G/DL
IMM GRANULOCYTES # BLD AUTO: 0.01 X10(3) UL (ref 0–1)
IMM GRANULOCYTES NFR BLD: 0.2 %
LYMPHOCYTES # BLD AUTO: 1.58 X10(3) UL (ref 1–4)
LYMPHOCYTES NFR BLD AUTO: 30.2 %
MCH RBC QN AUTO: 29.4 PG (ref 26–34)
MCHC RBC AUTO-ENTMCNC: 31.1 G/DL (ref 31–37)
MCV RBC AUTO: 94.5 FL
MONOCYTES # BLD AUTO: 0.43 X10(3) UL (ref 0.1–1)
MONOCYTES NFR BLD AUTO: 8.2 %
NEUTROPHILS # BLD AUTO: 2.86 X10 (3) UL (ref 1.5–7.7)
NEUTROPHILS # BLD AUTO: 2.86 X10(3) UL (ref 1.5–7.7)
NEUTROPHILS NFR BLD AUTO: 54.5 %
OSMOLALITY SERPL CALC.SUM OF ELEC: 298 MOSM/KG (ref 275–295)
PHOSPHATE SERPL-MCNC: 4.3 MG/DL (ref 2.4–5.1)
PLATELET # BLD AUTO: 445 10(3)UL (ref 150–450)
POTASSIUM SERPL-SCNC: 5 MMOL/L (ref 3.5–5.1)
RBC # BLD AUTO: 4.36 X10(6)UL
SODIUM SERPL-SCNC: 142 MMOL/L (ref 136–145)
WBC # BLD AUTO: 5.2 X10(3) UL (ref 4–11)

## 2024-10-05 PROCEDURE — 80069 RENAL FUNCTION PANEL: CPT

## 2024-10-05 PROCEDURE — 36415 COLL VENOUS BLD VENIPUNCTURE: CPT

## 2024-10-05 PROCEDURE — 85025 COMPLETE CBC W/AUTO DIFF WBC: CPT

## 2024-10-05 PROCEDURE — 99213 OFFICE O/P EST LOW 20 MIN: CPT

## 2024-10-05 NOTE — TELEPHONE ENCOUNTER
Kidney function is getting worse.  Anything new going on any new medications.  Make sure she is drinking plenty of fluids.  Avoid nonsteroidals.  If she taking Dyazide more than once a week?.  Repeat labs in 4 weeks.

## 2024-10-05 NOTE — PROGRESS NOTES
HPI:    Patient ID: Valery Casillas is a 79 year old female.    HPI     Patient here in office with complaint of persistent right arm pain after falling on 6/2/2024 and 9/13/2024, landed on right side of body.  Pain rated 8/10, and is constant.  Taking tramadol and Tylenol as needed with mild improvement.  Right forearm x-ray completed on 9/28/2024 and showed no radial or ulnar fracture.  Ultrasound carotid Doppler also completed on 8/13/2024 due to history of right carotid endarterectomy, results show stable findings with no increased plaque or significant flow changes.  Per patient, fall on 9/13/2024 occurred when she was bending forward to  item off the floor.  Given referral for physical therapy, having insurance issues, (will reach out to home PT facility).    Also complains of left hip pain, rated 10/10, started on Wednesday.  Patient unsure if pain related to her sleeping more on left side due to right arm pain.  Aggravated with walking and sitting for long periods of time.  Denies injury.     Review of Systems   Constitutional: Negative.    Respiratory: Negative.     Cardiovascular: Negative.    Musculoskeletal:         Right arm/left hip pain   Skin: Negative.    Neurological: Negative.    Psychiatric/Behavioral: Negative.              Current Outpatient Medications   Medication Sig Dispense Refill    diclofenac 1 % External Gel Apply 2 grams four times daily to right forearm and 4 grams four times daily to left hip. 150 g 0    traMADol 50 MG Oral Tab Take 0.5-1 tablets (25-50 mg total) by mouth 2 (two) times daily as needed for Pain. 20 tablet 0    amLODIPine 2.5 MG Oral Tab Take 1 tablet (2.5 mg total) by mouth daily. 90 tablet 3    metoprolol succinate ER 25 MG Oral Tablet 24 Hr Take 1 tablet (25 mg total) by mouth daily. 90 tablet 3    triamterene-hydroCHLOROthiazide (DYAZIDE) 37.5-25 MG Oral Cap 1capsule 1 times a week 12 capsule 3    citalopram 10 MG Oral Tab Take 1 tablet (10 mg total) by  mouth once daily. 90 tablet 3    pravastatin 40 MG Oral Tab Take 1 tablet (40 mg total) by mouth nightly. 90 tablet 3    guaiFENesin 100 MG/5 ML Oral Take 5 mL (100 mg total) by mouth every 4 (four) hours as needed. 180 mL 0    aspirin 325 MG Oral Tab Take 0.5 tablets (162.5 mg total) by mouth daily. 90 tablet 1    Calcium-Phosphorus-Vitamin D (CITRACAL +D3 OR) Take 1 tablet by mouth daily.      Vitamin B-12 (VITAMIN B12) 1000 MCG Oral Tab Take 1 tablet (1,000 mcg total) by mouth daily.      loratadine (CLARITIN) 10 MG Oral Tab Take 1 tablet (10 mg total) by mouth daily.      folic acid (FOLVITE) 400 MCG Oral Tab Take 1 tablet (400 mcg total) by mouth daily.      Acetaminophen (TYLENOL EXTRA STRENGTH OR) Take  by mouth.      Multiple Vitamins-Minerals (CENTRUM SILVER) Oral Tab Take 1 tablet by mouth daily.         Allergies:  Allergies   Allergen Reactions    Adhesive Tape RASH     Pulled the hair off      /65   Pulse 71   Temp 98.4 °F (36.9 °C) (Temporal)   Resp 18   Ht 5' 3\" (1.6 m)   Wt 180 lb (81.6 kg)   LMP  (LMP Unknown)   SpO2 97%   BMI 31.89 kg/m²   Body mass index is 31.89 kg/m².  PHYSICAL EXAM:   Physical Exam  Vitals reviewed.   Constitutional:       Appearance: Normal appearance.   Cardiovascular:      Rate and Rhythm: Normal rate and regular rhythm.      Heart sounds: Normal heart sounds. No murmur heard.     No friction rub. No gallop.   Pulmonary:      Effort: Pulmonary effort is normal. No respiratory distress.      Breath sounds: No stridor. No wheezing, rhonchi or rales.   Chest:      Chest wall: No tenderness.   Musculoskeletal:         General: Tenderness and signs of injury present.      Comments: Right anterior forearm pain from elbow to wrist.  No pain with elbow/wrist flexion/extension   Skin:     General: Skin is warm.      Findings: No rash.   Neurological:      General: No focal deficit present.      Mental Status: She is alert and oriented to person, place, and time.    Psychiatric:         Mood and Affect: Mood normal.         Behavior: Behavior normal.         Thought Content: Thought content normal.         Judgment: Judgment normal.                ASSESSMENT/PLAN:   1. Left hip pain  - XR HIP + PELVIS MIN 4 VIEWS LEFT (CPT=73503); Future    2. Right forearm pain  - diclofenac 1 % External Gel, Apply 2 grams four times daily to right forearm and 4 grams four times daily to left hip.   -Continue tramadol and Tylenol as needed for pain  -Repeat right forearm x-ray if pain not improving in 2 weeks, order placed  - XR FOREARM (2 VIEWS), RIGHT (CPT=73090); Future  -Discussed plan of care with Dr. oJe    3. History of fall  -Patient encouraged to call back home physical therapy facility about denial of services  - XR HIP + PELVIS MIN 4 VIEWS LEFT (CPT=73503); Future  - XR FOREARM (2 VIEWS), RIGHT (CPT=73090); Future      No orders of the defined types were placed in this encounter.      Meds This Visit:  Requested Prescriptions     Signed Prescriptions Disp Refills    diclofenac 1 % External Gel 150 g 0     Sig: Apply 2 grams four times daily to right forearm and 4 grams four times daily to left hip.       Imaging & Referrals:  XR HIP + PELVIS MIN 4 VIEWS LEFT (CPT=73503)  XR FOREARM (2 VIEWS), RIGHT (CPT=73090)         ID#[id number]

## 2024-10-07 NOTE — TELEPHONE ENCOUNTER
Dr Campa Received a returned call from patient and informed of note below  and last name verified,verbalized understanding reported has not been feeling well,since another fall last month 24, xray was done negative ,is taking pain meds Tramadol 25-50 mg as needed twice daily ,will do hip xay complaining of hip pain  cream Diclofenac ordered but pt will wait until done with the xray ,saw Nurse practitioner last Saturday .is only taking Dyazide 37.5-25 mg once a week,pt has not been drinking water as supposed too is more sleepy due to the med Tramadol.

## 2024-10-08 ENCOUNTER — HOSPITAL ENCOUNTER (OUTPATIENT)
Dept: GENERAL RADIOLOGY | Facility: HOSPITAL | Age: 79
Discharge: HOME | End: 2024-10-08
Payer: MEDICARE

## 2024-10-08 DIAGNOSIS — M25.552 LEFT HIP PAIN: ICD-10-CM

## 2024-10-08 DIAGNOSIS — Z91.81 HISTORY OF FALL: ICD-10-CM

## 2024-10-08 PROCEDURE — 73502 X-RAY EXAM HIP UNI 2-3 VIEWS: CPT

## 2024-10-18 ENCOUNTER — TELEPHONE (OUTPATIENT)
Dept: FAMILY MEDICINE CLINIC | Facility: CLINIC | Age: 79
End: 2024-10-18

## 2024-10-18 DIAGNOSIS — M25.352 INSTABILITY OF LEFT HIP JOINT: Primary | ICD-10-CM

## 2024-10-18 DIAGNOSIS — R29.6 FREQUENT FALLS: ICD-10-CM

## 2024-10-18 DIAGNOSIS — M79.631 RIGHT FOREARM PAIN: ICD-10-CM

## 2024-10-18 NOTE — TELEPHONE ENCOUNTER
Verified name and .    Patient was seen during office visit with WENDY Reaves on 10/5/24- she states that WENDY Reaves recommended that patient use walker and advised her to call if she wanted to request order for walker.    She is requesting and order for a walker.    She also states that physical therapist advised that she should use a grabber tool/reacher to assist with picking things off of the floor and is requesting if order for grabber tool/reacher can be placed as well.       Quality 130: Documentation Of Current Medications In The Medical Record: Current Medications Documented Quality 111:Pneumonia Vaccination Status For Older Adults: Pneumococcal Vaccination Previously Received Quality 431: Preventive Care And Screening: Unhealthy Alcohol Use - Screening: Patient screened for unhealthy alcohol use using a single question and scores less than 2 times per year Name And Contact Information For Health Care Proxy: Barbara Shaffer [phone] Detail Level: Detailed Quality 47: Advance Care Plan: Advance Care Planning discussed and documented; advance care plan or surrogate decision maker documented in the medical record. Quality 110: Preventive Care And Screening: Influenza Immunization: Influenza Immunization Administered during Influenza season Quality 226: Preventive Care And Screening: Tobacco Use: Screening And Cessation Intervention: Patient screened for tobacco use and is an ex/non-smoker Quality 431: Preventive Care And Screening: Unhealthy Alcohol Use - Screening: Patient not identified as an unhealthy alcohol user when screened for unhealthy alcohol use using a systematic screening method

## 2024-10-21 NOTE — TELEPHONE ENCOUNTER
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

## 2024-11-01 ENCOUNTER — LAB ENCOUNTER (OUTPATIENT)
Dept: LAB | Facility: HOSPITAL | Age: 79
End: 2024-11-01
Attending: INTERNAL MEDICINE
Payer: MEDICARE

## 2024-11-01 ENCOUNTER — TELEPHONE (OUTPATIENT)
Dept: FAMILY MEDICINE CLINIC | Facility: CLINIC | Age: 79
End: 2024-11-01

## 2024-11-01 DIAGNOSIS — N18.32 STAGE 3B CHRONIC KIDNEY DISEASE (HCC): ICD-10-CM

## 2024-11-01 LAB
ALBUMIN SERPL-MCNC: 4.6 G/DL (ref 3.2–4.8)
ANION GAP SERPL CALC-SCNC: 7 MMOL/L (ref 0–18)
BASOPHILS # BLD AUTO: 0.07 X10(3) UL (ref 0–0.2)
BASOPHILS NFR BLD AUTO: 1.7 %
BUN BLD-MCNC: 21 MG/DL (ref 9–23)
BUN/CREAT SERPL: 14.1 (ref 10–20)
CALCIUM BLD-MCNC: 9.7 MG/DL (ref 8.7–10.4)
CHLORIDE SERPL-SCNC: 109 MMOL/L (ref 98–112)
CO2 SERPL-SCNC: 28 MMOL/L (ref 21–32)
CREAT BLD-MCNC: 1.49 MG/DL
DEPRECATED RDW RBC AUTO: 45 FL (ref 35.1–46.3)
EGFRCR SERPLBLD CKD-EPI 2021: 35 ML/MIN/1.73M2 (ref 60–?)
EOSINOPHIL # BLD AUTO: 0.25 X10(3) UL (ref 0–0.7)
EOSINOPHIL NFR BLD AUTO: 6 %
ERYTHROCYTE [DISTWIDTH] IN BLOOD BY AUTOMATED COUNT: 12.5 % (ref 11–15)
GLUCOSE BLD-MCNC: 107 MG/DL (ref 70–99)
HCT VFR BLD AUTO: 42.7 %
HGB BLD-MCNC: 12.9 G/DL
IMM GRANULOCYTES # BLD AUTO: 0.01 X10(3) UL (ref 0–1)
IMM GRANULOCYTES NFR BLD: 0.2 %
LYMPHOCYTES # BLD AUTO: 1.13 X10(3) UL (ref 1–4)
LYMPHOCYTES NFR BLD AUTO: 27.2 %
MCH RBC QN AUTO: 29.5 PG (ref 26–34)
MCHC RBC AUTO-ENTMCNC: 30.2 G/DL (ref 31–37)
MCV RBC AUTO: 97.5 FL
MONOCYTES # BLD AUTO: 0.32 X10(3) UL (ref 0.1–1)
MONOCYTES NFR BLD AUTO: 7.7 %
NEUTROPHILS # BLD AUTO: 2.38 X10 (3) UL (ref 1.5–7.7)
NEUTROPHILS # BLD AUTO: 2.38 X10(3) UL (ref 1.5–7.7)
NEUTROPHILS NFR BLD AUTO: 57.2 %
OSMOLALITY SERPL CALC.SUM OF ELEC: 301 MOSM/KG (ref 275–295)
PHOSPHATE SERPL-MCNC: 3.2 MG/DL (ref 2.4–5.1)
PLATELET # BLD AUTO: 309 10(3)UL (ref 150–450)
POTASSIUM SERPL-SCNC: 4.2 MMOL/L (ref 3.5–5.1)
RBC # BLD AUTO: 4.38 X10(6)UL
SODIUM SERPL-SCNC: 144 MMOL/L (ref 136–145)
WBC # BLD AUTO: 4.2 X10(3) UL (ref 4–11)

## 2024-11-01 PROCEDURE — 85025 COMPLETE CBC W/AUTO DIFF WBC: CPT

## 2024-11-01 PROCEDURE — 36415 COLL VENOUS BLD VENIPUNCTURE: CPT

## 2024-11-01 PROCEDURE — 80069 RENAL FUNCTION PANEL: CPT

## 2024-11-01 NOTE — TELEPHONE ENCOUNTER
Lacy from [employer] is calling to advise they received the order for the patient     Rollator    Requesting the qualifying diagnosis and face to face notes; please mention what is causing the gait.      [phone]    Please send the response on [place]    Please see closed telephone encounter Dated 10/18/24

## 2024-11-07 NOTE — TELEPHONE ENCOUNTER
Patient calling back ,verified name and date of birth.    Patient called to say she has an appointment scheduled with Dr Tomas on 11/20/24 for Medicare Wellness Visit and she will discuss walker with Dr Tomas at that time.  She is unable to schedule an earlier appointment due to transportation issues.

## 2024-11-20 ENCOUNTER — OFFICE VISIT (OUTPATIENT)
Dept: FAMILY MEDICINE CLINIC | Facility: CLINIC | Age: 79
End: 2024-11-20

## 2024-11-20 VITALS
DIASTOLIC BLOOD PRESSURE: 66 MMHG | BODY MASS INDEX: 32.11 KG/M2 | SYSTOLIC BLOOD PRESSURE: 124 MMHG | WEIGHT: 181.19 LBS | HEIGHT: 63 IN | HEART RATE: 62 BPM

## 2024-11-20 DIAGNOSIS — Z00.00 ENCOUNTER FOR ANNUAL HEALTH EXAMINATION: Primary | ICD-10-CM

## 2024-11-20 DIAGNOSIS — F32.A MILD DEPRESSION: ICD-10-CM

## 2024-11-20 DIAGNOSIS — M54.16 LUMBAR RADICULOPATHY: ICD-10-CM

## 2024-11-20 DIAGNOSIS — M25.552 LEFT HIP PAIN: ICD-10-CM

## 2024-11-20 DIAGNOSIS — I10 ESSENTIAL HYPERTENSION: ICD-10-CM

## 2024-11-20 DIAGNOSIS — E78.5 HYPERLIPIDEMIA, UNSPECIFIED HYPERLIPIDEMIA TYPE: ICD-10-CM

## 2024-11-20 DIAGNOSIS — R29.6 FREQUENT FALLS: ICD-10-CM

## 2024-11-20 RX ORDER — METOPROLOL SUCCINATE 25 MG/1
25 TABLET, EXTENDED RELEASE ORAL DAILY
Qty: 90 TABLET | Refills: 3 | Status: SHIPPED | OUTPATIENT
Start: 2024-11-20

## 2024-11-20 RX ORDER — TRIAMTERENE AND HYDROCHLOROTHIAZIDE 37.5; 25 MG/1; MG/1
CAPSULE ORAL
Qty: 12 CAPSULE | Refills: 3 | Status: SHIPPED | OUTPATIENT
Start: 2024-11-20

## 2024-11-20 RX ORDER — METOPROLOL SUCCINATE 25 MG/1
25 TABLET, EXTENDED RELEASE ORAL DAILY
Qty: 90 TABLET | Refills: 3 | Status: SHIPPED | OUTPATIENT
Start: 2024-11-20 | End: 2024-11-20

## 2024-11-20 RX ORDER — PRAVASTATIN SODIUM 40 MG
40 TABLET ORAL NIGHTLY
Qty: 90 TABLET | Refills: 3 | Status: SHIPPED | OUTPATIENT
Start: 2024-11-20

## 2024-11-20 RX ORDER — CITALOPRAM HYDROBROMIDE 10 MG/1
10 TABLET ORAL
Qty: 90 TABLET | Refills: 3 | Status: SHIPPED | OUTPATIENT
Start: 2024-11-20 | End: 2024-11-20

## 2024-11-20 RX ORDER — CITALOPRAM HYDROBROMIDE 10 MG/1
10 TABLET ORAL
Qty: 90 TABLET | Refills: 3 | Status: SHIPPED | OUTPATIENT
Start: 2024-11-20

## 2024-11-20 RX ORDER — PRAVASTATIN SODIUM 40 MG
40 TABLET ORAL NIGHTLY
Qty: 90 TABLET | Refills: 3 | Status: SHIPPED | OUTPATIENT
Start: 2024-11-20 | End: 2024-11-20

## 2024-11-20 RX ORDER — AMLODIPINE BESYLATE 2.5 MG/1
2.5 TABLET ORAL DAILY
Qty: 90 TABLET | Refills: 3 | Status: SHIPPED | OUTPATIENT
Start: 2024-11-20 | End: 2024-11-20

## 2024-11-20 RX ORDER — AMLODIPINE BESYLATE 2.5 MG/1
2.5 TABLET ORAL DAILY
Qty: 90 TABLET | Refills: 3 | Status: SHIPPED | OUTPATIENT
Start: 2024-11-20 | End: 2025-11-15

## 2024-11-20 RX ORDER — TRIAMTERENE AND HYDROCHLOROTHIAZIDE 37.5; 25 MG/1; MG/1
CAPSULE ORAL
Qty: 12 CAPSULE | Refills: 3 | Status: SHIPPED | OUTPATIENT
Start: 2024-11-20 | End: 2024-11-20

## 2024-11-20 NOTE — PROGRESS NOTES
Subjective:   Valery Casillas is a 79 year old female who presents for a Medicare Subsequent Annual Wellness visit (Pt already had Initial Annual Wellness) and scheduled follow up of multiple significant but stable problems    Pt presenting for routine physical and annual medicare wellness check. Denies any acute issues or recent illnesses. Chronic problems as below. Past medical/surgical history, family history, and social history were reviewed. Diet and exercise have been fair. Medicare screening questions per nurse were reviewed. Pt requesting annual testing and med refills.     Reports multiple falls this summer (June, Sept) which she attributes to history of drop attacks  Workup including carotid US stable s/p Right endarterectomy  Has been doing HH PT with some improvement in strength and balance  Continues to have mobility limitation that significantly impairs her ability to participate in MRADLs in the home including cleaning, bathing  She is safely able to use a rollator walker for assistance  She would also benefit from rollator walker use during home PT sessions to improve overall mobility  Her functional mobility deficit can be sufficiently resolved with use of walker    Mood stable, denies SH/SI/HI    History/Other:   Fall Risk Assessment:   She has been screened for Falls and is High Risk. Fall Prevention information provided to patient in After Visit Summary.    Do you feel unsteady when standing or walking?: No  Do you worry about falling?: No  Have you fallen in the past year?: Yes  How many times have you fallen?: 2  Were you injured?: No     Cognitive Assessment:   She had a completely normal cognitive assessment - see flowsheet entries       Functional Ability/Status:   Valery Casillas has some abnormal functions as listed below:  She has Driving difficulties based on screening of functional status.       Depression Screening (PHQ):  PHQ-2 SCORE: 0  , done 11/20/2024        8 minutes spent  screening and counseling for depression    Advanced Directives:   She does NOT have a Living Will. [Do you have a living will?: No]  She has a Power of  for Health Care on file in [facility].  Discussed Advance Care Planning with patient (and family/surrogate if present). Standard forms made available to patient in After Visit Summary.      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[facility])    Age-related nuclear cataract of both eyes    CKD (chronic kidney disease) stage 3, GFR 30-59 ml/min ([facility])    Floater, vitreous, left    Medicare annual wellness visit, initial    Acute pharyngitis    Allergic rhinitis    Anxiety state    Backache    Benign neoplasm of skin of upper limb, including shoulder    Benign neoplasm of thyroid gland    Chronic sinusitis    Concussion    Contusion    Cough    Dermatochalasis    Dermatomycosis    Dermatophytosis of nail    Diarrhea    Disorder of arteries and arterioles ([facility])    Disorder of eyelid    Dizziness    Dysfunction of eustachian tube    Head injury    Headache    Hearing loss    Hemorrhage of gastrointestinal tract    Hemorrhage of rectum and anus    Hip, thigh, leg, and ankle, insect bite, nonvenomous    Hordeolum internum    Idiopathic peripheral autonomic neuropathy    Impacted cerumen    Nonspecific abnormal cardiovascular function study    Mononeuritis of upper limb    Malaise and fatigue    Occlusion and stenosis of carotid artery    Occlusion and stenosis of carotid artery with cerebral infarction    Plantar fascial fibromatosis    Polyneuropathy in other diseases classified elsewhere ([facility])    Rash and nonspecific skin eruption    Tension headache    Seborrheic keratosis    Symptomatic menopausal or female climacteric states    Transient cerebral ischemia    Retinal defect    Major depressive disorder, single episode, mild ([facility])      Allergies:  She is allergic to adhesive tape.    Current Medications:  Outpatient Medications Marked as Taking for the 11/20/24 encounter (Office Visit) with Cornelia Tomas MD   Medication Sig    amLODIPine 2.5 MG Oral Tab Take 1 tablet (2.5 mg total) by mouth daily.    citalopram 10 MG Oral Tab Take 1 tablet (10 mg total) by mouth once daily.    metoprolol succinate ER 25 MG Oral Tablet 24 Hr Take 1 tablet (25 mg total) by mouth daily.    pravastatin 40 MG Oral Tab Take 1 tablet (40 mg total) by mouth nightly.    triamterene-hydroCHLOROthiazide (DYAZIDE) 37.5-25 MG Oral Cap 1capsule 1 times a week    aspirin 325 MG Oral Tab Take 0.5 tablets (162.5 mg total) by mouth daily.    Calcium-Phosphorus-Vitamin D (CITRACAL +D3 OR) Take 1 tablet by mouth daily.    Vitamin B-12 (VITAMIN B12) 1000 MCG Oral Tab Take 1 tablet (1,000 mcg total) by mouth daily.    loratadine (CLARITIN) 10 MG Oral Tab Take 1 tablet (10 mg total) by mouth daily.    folic acid (FOLVITE) 400 MCG Oral Tab Take 1 tablet (400 mcg total) by mouth daily.    Acetaminophen (TYLENOL EXTRA STRENGTH OR) Take  by mouth.       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  Surgical History:  She  has a past surgical history that includes hysterectomy (1992); cholecystectomy (1996); colonoscopy; and other surgical history (2010).   Family History:  Her family history includes Breast Cancer (age of onset: 70) in her paternal aunt; Breast Cancer (age of onset: 76) in her mother; Diabetes in an other family member; Heart Attack (age of onset: 54) in her father; Heart Disorder in her mother; Hypertension in her maternal  uncle and mother; Lipids in an other family member; Stroke (age of onset: 69) in her maternal grandmother; Stroke (age of onset: 78) in her mother.  Social History:  She  reports that she quit smoking about 45 years ago. Her smoking use included cigarettes. She started smoking about 59 years ago. She has a 7 pack-year smoking history. She has never used smokeless tobacco. She reports that she does not drink alcohol and does not use drugs.    Tobacco:  She smoked tobacco in the past but quit greater than 12 months ago.  Social History     Tobacco Use   Smoking Status Former    Current packs/day: 0.00    Average packs/day: 0.5 packs/day for 14.0 years (7.0 ttl pk-yrs)    Types: Cigarettes    Start date: 1965    Quit date: 1979    Years since quittin.9   Smokeless Tobacco Never          CAGE Alcohol Screen:   CAGE screening score of 0 on 2024, showing low risk of alcohol abuse.      Patient Care Team:  Cornelia Tomas MD as PCP - General (Family Medicine)    Review of Systems     Negative except frequent falls    Objective:   Physical Exam  General appearance: alert, appears stated age, and cooperative  Head: Normocephalic, without obvious abnormality, atraumatic  Eyes: negative  Ears: abnormal external canal right ear - cerumen removed with manual debridement  Nose: no discharge  Throat: normal findings: lips normal without lesions  Neck: no adenopathy, supple, symmetrical, trachea midline, and thyroid not enlarged, symmetric, no tenderness/mass/nodules  Back: negative  Lungs: clear to auscultation bilaterally  Breasts:   deferred  Heart: S1, S2 normal, regular rate and rhythm  Abdomen: soft, non-tender; bowel sounds normal; no masses,  no organomegaly  Pelvic: deferred  Extremities: extremities normal, atraumatic, no cyanosis or edema  Pulses: 2+ and symmetric  Skin: Skin color, texture, turgor normal. No rashes or lesions  Lymph nodes:  negative  Neurologic: Grossly normal    /66    Pulse 62   Ht 5' 3\" (1.6 m)   Wt 181 lb 3.2 oz (82.2 kg)   LMP  (LMP Unknown)   BMI 32.10 kg/m²  Estimated body mass index is 32.1 kg/m² as calculated from the following:    Height as of this encounter: 5' 3\" (1.6 m).    Weight as of this encounter: 181 lb 3.2 oz (82.2 kg).    Medicare Hearing Assessment:   Hearing Screening    Screening Method: Questionnaire  I have a problem hearing over the telephone: No I have trouble following the conversations when two or more people are talking at the same time: No   I have trouble understanding things on the TV: No I have to strain to understand conversations: No   I have to worry about missing the telephone ring or doorbell: No I have trouble hearing conversations in a noisy background such as a crowded room or restaurant: No   I get confused about where sounds come from: No I misunderstand some words in a sentence and need to ask people to repeat themselves: No   I especially have trouble understanding the speech of women and children: No I have trouble understanding the speaker in a large room such as at a meeting or place of [employer]: No   Many people I talk to seem to mumble (or don't speak clearly): No People get annoyed because I misunderstand what they say: No   I misunderstand what others are saying and make inappropriate responses: No I avoid social activities because I cannot hear well and fear I will reply improperly: No   Family members and friends have told me they think I may have hearing loss: No                   Assessment & Plan:   Valery Casillas is a 79 year old female who presents for a Medicare Assessment.     1. Encounter for annual health examination (Primary)  Healthy physical exam. Advised to exercise regularly, monitor diet and weight, and follow-up as directed. Will check labs. Continue current medications. Regular follow-up with Ophtho. Annual Medicare physical.  Discussed preventative screenings  - recent labs reviewed  - encouraged to  continue diet/exercise for overall wellness  - follow-up with eye doctor annually  - follow-up with dentist every 6 months  - return yearly for physicals  - annual flu shot  -     Advance Care Planning- 1st 30 minutes  2. Mild depression  Stable, refilled  -     Discontinue: Citalopram Hydrobromide; Take 1 tablet (10 mg total) by mouth once daily.  Dispense: 90 tablet; Refill: 3  -     Citalopram Hydrobromide; Take 1 tablet (10 mg total) by mouth once daily.  Dispense: 90 tablet; Refill: 3  3. Hyperlipidemia, unspecified hyperlipidemia type  Stable, continue statin dosing  Overview:  Immunization History   Administered Date(s) Administered    FLU VAC High Dose 65 YRS & Older PRSV Free (47556) 10/01/2014, 10/01/2016, 09/21/2017, 09/17/2020, 09/17/2020    FLUZONE 6 months and older PFS 0.5 ml (66083) 09/26/2019    Influenza 10/14/2006, 10/16/2007, 10/08/2008, 10/14/2009, 10/12/2010, 10/06/2011, 10/17/2012, 09/30/2013, 09/01/2018    Pneumococcal (Prevnar 13) 10/26/2017    Pneumovax 23 04/29/2009    TDAP 10/16/2007       Orders:  -     Discontinue: Pravastatin Sodium; Take 1 tablet (40 mg total) by mouth nightly.  Dispense: 90 tablet; Refill: 3  -     Pravastatin Sodium; Take 1 tablet (40 mg total) by mouth nightly.  Dispense: 90 tablet; Refill: 3  4. Essential hypertension  In-office BP stable, pt otherwise asymptomatic  - discussed benefits of DASH diet and daily activity  - continue BP monitoring  - continue daily medication  Overview:  Immunization History   Administered Date(s) Administered    Influenza 10/14/2006, 10/16/2007, 10/08/2008, 10/14/2009, 10/12/2010, 10/06/2011, 10/17/2012, 09/30/2013    Influenza Vaccine, High Dose, Preserv Free 10/01/2014, 10/01/2016, 09/21/2017    Pneumococcal Vaccine, Conjugate 04/29/2009    TDAP 10/16/2007       Orders:  -     Discontinue: amLODIPine Besylate; Take 1 tablet (2.5 mg total) by mouth daily.  Dispense: 90 tablet; Refill: 3  -     Discontinue: Metoprolol Succinate ER;  Take 1 tablet (25 mg total) by mouth daily.  Dispense: 90 tablet; Refill: 3  -     Discontinue: Triamterene-HCTZ; 1capsule 1 times a week  Dispense: 12 capsule; Refill: 3  -     amLODIPine Besylate; Take 1 tablet (2.5 mg total) by mouth daily.  Dispense: 90 tablet; Refill: 3  -     Metoprolol Succinate ER; Take 1 tablet (25 mg total) by mouth daily.  Dispense: 90 tablet; Refill: 3  -     Triamterene-HCTZ; 1capsule 1 times a week  Dispense: 12 capsule; Refill: 3  5. Left hip pain  -     DME - External  6. Frequent falls  Has been doing HH PT with some improvement in strength and balance  Continues to have mobility limitation that significantly impairs her ability to participate in MRADLs in the home including cleaning, bathing  She is safely able to use a rollator walker for assistance  She would also benefit from rollator walker use during home PT sessions to improve overall mobility  Her functional mobility deficit can be sufficiently resolved with use of walker  -     DME - External  7. Lumbar radiculopathy  -     DME - External  The patient indicates understanding of these issues and agrees to the plan.  Reinforced healthy diet, lifestyle, and exercise.      Return in 6 months (on 5/20/2025).     Cornelia Tomas MD, 11/20/2024     Supplementary Documentation:   General Health:  In the past six months, have you lost more than 10 pounds without trying?: 2 - No  Has your appetite been poor?: No  Type of Diet: Balanced  How does the patient maintain a good energy level?: Appropriate Exercise  How would you describe your daily physical activity?: Moderate  How would you describe your current health state?: Good  How do you maintain positive mental well-being?: Visiting Friends;Visiting Family  On a scale of 0 to 10, with 0 being no pain and 10 being severe pain, what is your pain level?: 1 - (Mild)  In the past six months, have you experienced urine leakage?: 1-Yes  At any time do you feel concerned for the  safety/well-being of yourself and/or your children, in your home or elsewhere?: No  Have you had any immunizations at another office such as Influenza, Hepatitis B, Tetanus, or Pneumococcal?: Yes    Health Maintenance   Topic Date Due    COVID-19 Vaccine (7 - 2024-25 season) 09/01/2024    Annual Physical  11/15/2024    Influenza Vaccine  Completed    DEXA Scan  Completed    Annual Depression Screening  Completed    Fall Risk Screening (Annual)  Completed    Pneumococcal Vaccine: 65+ Years  Completed    Zoster Vaccines  Completed

## 2024-11-25 ENCOUNTER — OFFICE VISIT (OUTPATIENT)
Dept: PODIATRY CLINIC | Facility: CLINIC | Age: 79
End: 2024-11-25
Payer: MEDICARE

## 2024-11-25 VITALS — DIASTOLIC BLOOD PRESSURE: 57 MMHG | SYSTOLIC BLOOD PRESSURE: 120 MMHG | HEART RATE: 67 BPM

## 2024-11-25 DIAGNOSIS — B35.1 ONYCHOMYCOSIS: Primary | ICD-10-CM

## 2024-11-25 PROCEDURE — 11720 DEBRIDE NAIL 1-5: CPT | Performed by: PODIATRIST

## 2024-11-25 PROCEDURE — 99213 OFFICE O/P EST LOW 20 MIN: CPT | Performed by: PODIATRIST

## 2024-12-17 ENCOUNTER — HOSPITAL ENCOUNTER (OUTPATIENT)
Dept: GENERAL RADIOLOGY | Facility: HOSPITAL | Age: 79
Discharge: HOME | End: 2024-12-17
Payer: MEDICARE

## 2024-12-17 DIAGNOSIS — Z91.81 HISTORY OF FALL: ICD-10-CM

## 2024-12-17 DIAGNOSIS — M79.631 RIGHT FOREARM PAIN: ICD-10-CM

## 2024-12-17 PROCEDURE — 73090 X-RAY EXAM OF FOREARM: CPT

## 2025-02-10 ENCOUNTER — OFFICE VISIT (OUTPATIENT)
Dept: PODIATRY CLINIC | Facility: CLINIC | Age: 80
End: 2025-02-10
Payer: MEDICARE

## 2025-02-10 ENCOUNTER — LAB ENCOUNTER (OUTPATIENT)
Dept: LAB | Facility: HOSPITAL | Age: 80
End: 2025-02-10
Attending: INTERNAL MEDICINE
Payer: MEDICARE

## 2025-02-10 DIAGNOSIS — L84 FOOT CALLUS: ICD-10-CM

## 2025-02-10 DIAGNOSIS — M79.674 PAIN DUE TO ONYCHOMYCOSIS OF TOENAIL OF RIGHT FOOT: ICD-10-CM

## 2025-02-10 DIAGNOSIS — I73.9 PVD (PERIPHERAL VASCULAR DISEASE): ICD-10-CM

## 2025-02-10 DIAGNOSIS — N18.32 STAGE 3B CHRONIC KIDNEY DISEASE (HCC): ICD-10-CM

## 2025-02-10 DIAGNOSIS — B35.1 PAIN DUE TO ONYCHOMYCOSIS OF TOENAIL OF RIGHT FOOT: ICD-10-CM

## 2025-02-10 DIAGNOSIS — B35.1 ONYCHOMYCOSIS: Primary | ICD-10-CM

## 2025-02-10 LAB
ALBUMIN SERPL-MCNC: 4.5 G/DL (ref 3.2–4.8)
ANION GAP SERPL CALC-SCNC: 8 MMOL/L (ref 0–18)
BASOPHILS # BLD AUTO: 0.08 X10(3) UL (ref 0–0.2)
BASOPHILS NFR BLD AUTO: 1.6 %
BUN BLD-MCNC: 21 MG/DL (ref 9–23)
BUN/CREAT SERPL: 15 (ref 10–20)
CALCIUM BLD-MCNC: 9.3 MG/DL (ref 8.7–10.4)
CHLORIDE SERPL-SCNC: 106 MMOL/L (ref 98–112)
CO2 SERPL-SCNC: 29 MMOL/L (ref 21–32)
CREAT BLD-MCNC: 1.4 MG/DL
DEPRECATED RDW RBC AUTO: 43.4 FL (ref 35.1–46.3)
EGFRCR SERPLBLD CKD-EPI 2021: 38 ML/MIN/1.73M2 (ref 60–?)
EOSINOPHIL # BLD AUTO: 0.28 X10(3) UL (ref 0–0.7)
EOSINOPHIL NFR BLD AUTO: 5.5 %
ERYTHROCYTE [DISTWIDTH] IN BLOOD BY AUTOMATED COUNT: 12.5 % (ref 11–15)
GLUCOSE BLD-MCNC: 110 MG/DL (ref 70–99)
HCT VFR BLD AUTO: 40.4 %
HGB BLD-MCNC: 12.6 G/DL
IMM GRANULOCYTES # BLD AUTO: 0.01 X10(3) UL (ref 0–1)
IMM GRANULOCYTES NFR BLD: 0.2 %
LYMPHOCYTES # BLD AUTO: 1.54 X10(3) UL (ref 1–4)
LYMPHOCYTES NFR BLD AUTO: 30.1 %
MCH RBC QN AUTO: 29.2 PG (ref 26–34)
MCHC RBC AUTO-ENTMCNC: 31.2 G/DL (ref 31–37)
MCV RBC AUTO: 93.5 FL
MONOCYTES # BLD AUTO: 0.44 X10(3) UL (ref 0.1–1)
MONOCYTES NFR BLD AUTO: 8.6 %
NEUTROPHILS # BLD AUTO: 2.77 X10 (3) UL (ref 1.5–7.7)
NEUTROPHILS # BLD AUTO: 2.77 X10(3) UL (ref 1.5–7.7)
NEUTROPHILS NFR BLD AUTO: 54 %
OSMOLALITY SERPL CALC.SUM OF ELEC: 300 MOSM/KG (ref 275–295)
PHOSPHATE SERPL-MCNC: 4 MG/DL (ref 2.4–5.1)
PLATELET # BLD AUTO: 368 10(3)UL (ref 150–450)
POTASSIUM SERPL-SCNC: 5 MMOL/L (ref 3.5–5.1)
RBC # BLD AUTO: 4.32 X10(6)UL
SODIUM SERPL-SCNC: 143 MMOL/L (ref 136–145)
WBC # BLD AUTO: 5.1 X10(3) UL (ref 4–11)

## 2025-02-10 PROCEDURE — 85025 COMPLETE CBC W/AUTO DIFF WBC: CPT

## 2025-02-10 PROCEDURE — 80069 RENAL FUNCTION PANEL: CPT

## 2025-02-10 PROCEDURE — 36415 COLL VENOUS BLD VENIPUNCTURE: CPT

## 2025-02-10 NOTE — PROGRESS NOTES
Reason for Visit      Valery Casillas is a 79 year old female presents today complaining of bilateral routine footcare.     History of Present Illness     Patient presents to clinic today after last being seen by podiatry on 8/8/2024 for routine footcare.  Patient is not diabetic but does have a history of peripheral vascular disease and is unable to debride her nails herself.  Patient denies any open lesions at this time.  Patient presents concerned with appearance of her right hallux nail.  Patient states that it is always been abnormal and she is interested in having it removed.    Patient returns to clinic today after last being seen on November 25, 2024 for routine footcare.  At her last office visit patient had a right hallux nail biopsied which came back negative for fungal bacteria or mold.    The following portions of the patient's history were reviewed and updated as appropriate: allergies, current medications, past family history, past medical history, past social history, past surgical history and problem list.    Allergies[1]      Current Outpatient Medications:     amLODIPine 2.5 MG Oral Tab, Take 1 tablet (2.5 mg total) by mouth daily., Disp: 90 tablet, Rfl: 3    citalopram 10 MG Oral Tab, Take 1 tablet (10 mg total) by mouth once daily., Disp: 90 tablet, Rfl: 3    metoprolol succinate ER 25 MG Oral Tablet 24 Hr, Take 1 tablet (25 mg total) by mouth daily., Disp: 90 tablet, Rfl: 3    pravastatin 40 MG Oral Tab, Take 1 tablet (40 mg total) by mouth nightly., Disp: 90 tablet, Rfl: 3    triamterene-hydroCHLOROthiazide (DYAZIDE) 37.5-25 MG Oral Cap, 1capsule 1 times a week, Disp: 12 capsule, Rfl: 3    aspirin 325 MG Oral Tab, Take 0.5 tablets (162.5 mg total) by mouth daily., Disp: 90 tablet, Rfl: 1    Calcium-Phosphorus-Vitamin D (CITRACAL +D3 OR), Take 1 tablet by mouth daily., Disp: , Rfl:     Vitamin B-12 (VITAMIN B12) 1000 MCG Oral Tab, Take 1 tablet (1,000 mcg total) by mouth daily., Disp: , Rfl:      loratadine (CLARITIN) 10 MG Oral Tab, Take 1 tablet (10 mg total) by mouth daily., Disp: , Rfl:     folic acid (FOLVITE) 400 MCG Oral Tab, Take 1 tablet (400 mcg total) by mouth daily., Disp: , Rfl:     Acetaminophen (TYLENOL EXTRA STRENGTH OR), Take  by mouth., Disp: , Rfl:     Multiple Vitamins-Minerals (CENTRUM SILVER) Oral Tab, Take 1 tablet by mouth daily.  , Disp: , Rfl:     There are no discontinued medications.    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[facility])    Age-related nuclear cataract of both eyes    CKD (chronic kidney disease) stage 3, GFR 30-59 ml/min ([facility])    Floater, vitreous, left    Medicare annual wellness visit, initial    Acute pharyngitis    Allergic rhinitis    Anxiety state    Backache    Benign neoplasm of skin of upper limb, including shoulder    Benign neoplasm of thyroid gland    Chronic sinusitis    Concussion    Contusion    Cough    Dermatochalasis    Dermatomycosis    Dermatophytosis of nail    Diarrhea    Disorder of arteries and arterioles    Disorder of eyelid    Dizziness    Dysfunction of eustachian tube    Head injury    Headache    Hearing loss    Hemorrhage of gastrointestinal tract    Hemorrhage of rectum and anus    Hip, thigh, leg, and ankle, insect bite, nonvenomous    Hordeolum internum    Idiopathic peripheral autonomic neuropathy    Impacted cerumen    Nonspecific abnormal cardiovascular function study    Mononeuritis of upper limb    Malaise and fatigue    Occlusion and stenosis of carotid artery    Occlusion and stenosis of carotid artery with cerebral infarction    Plantar fascial fibromatosis    Polyneuropathy in other diseases classified elsewhere ([facility])    Rash and nonspecific skin eruption    Tension headache    Seborrheic keratosis    Symptomatic menopausal or female climacteric states    Transient cerebral ischemia     Retinal defect    Major depressive disorder, single episode, mild       Past Medical History:    Allergic rhinitis    Anxiety state, unspecified    Carotid artery disease    Cataracts, both eyes    OU    Depression    Kidney disease, chronic, stage III (GFR 30-59 ml/min) ([facility])    Melanoma, choroid, right eye (HCC)    OD, Radiation plaque for 7 days; per NG    Other and unspecified hyperlipidemia    Retinal scar of right eye    \"Retinal scarring due to melanoma/ radiation\"    Unspecified essential hypertension       Past Surgical History:   Procedure Laterality Date    Cholecystectomy      Colonoscopy      Hysterectomy      Other surgical history      Melanoma of Choroid: Radiation plaque for 7 days; treated       Family History   Problem Relation Age of Onset    Heart Attack Father 54        MI; per NG    Stroke Mother 78        per NG    Breast Cancer Mother 76        per NG    Heart Disorder Mother         CHF; per NG    Hypertension Mother         per NG    Stroke Maternal Grandmother 69        Cause of death; per NG    Diabetes Other         Aunt; per NG    Lipids Other         Family H/o Hyperlipidemia; per NG    Hypertension Maternal Uncle         per NG    Breast Cancer Paternal Aunt 70    Glaucoma Neg     Macular degeneration Neg        Social History     Occupational History    Not on file   Tobacco Use    Smoking status: Former     Current packs/day: 0.00     Average packs/day: 0.5 packs/day for 14.0 years (7.0 ttl pk-yrs)     Types: Cigarettes     Start date: 1965     Quit date: 1979     Years since quittin.1    Smokeless tobacco: Never   Vaping Use    Vaping status: Never Used   Substance and Sexual Activity    Alcohol use: No     Alcohol/week: 0.0 standard drinks of alcohol    Drug use: No    Sexual activity: Not Currently     Partners: Male       ROS      Constitutional: negative for chills, fevers and sweats  Gastrointestinal: negative for abdominal pain, diarrhea, nausea  and vomiting  Genitourinary:negative for dysuria and hematuria  Musculoskeletal:negative for arthralgias and muscle weakness  Neurological: negative for paresthesia and weakness  All others reviewed and negative.      Physical Exam     LE PHYSICAL EXAM    Constitution: Well-developed and well-nourished. Gait appears normal. No apparent distress. Alert and oriented to person, place, and time.  Integument: There are no varicosities. Skin appears moist, warm, and supple with positive hair growth. There are no color changes. No open lesions. No macerations, No Hyperkeratotic lesions.  Vascular examination: Dorsalis pedis and posterior tibial pulses are strong bilaterally with capillary filling time less than 3 seconds to all digits. There is no peripheral edema..  Neurological Sensorium: Grossly intact to sharp/dull. Vibratory: Intact.  Musculoskeletal:   5/5 pedal muscle strength b/l     Advanced trophic changes as: hair growth decrease nail changes  (thickening) pigmentary changes (discoloration) skin texture (thin, shiny)   Procedure       Nails 1 through 5 bilateral debrided with use of a nail nipper.  Patient Toller procedure well had no complications.  Neurovascular status intact to the level of the digits post procedure.     Assessment and Plan     Encounter Diagnoses   Name Primary?    Onychomycosis Yes    Pain due to onychomycosis of toenail of right foot     Foot callus     PVD (peripheral vascular disease)      Evaluated the patient. Discussed treatment options with the patient.  Discussed with patient proper care and hygiene for their feet.  Patient tolerated procedures well, without incident.    Instrumentation used includes nail nippers and electric  where appropriate.  Procedure: (72035 Debridement of toenails 6-10) Surgically debrided and mechanically reduced 6 or more toenails.Hemmorhage occurred none.Nails that were debrided appeared dystrophic and caused the patient pain in shoe gear.Nails 5  Left & 5 Right.     Evaluated patient. Discussed treatment options with patient.  Discussed proper hygiene and care for feet as well as use of emollient creams (ie Urea based creams)  Answered all patient questions. Discussed offloading hyperkeratotic lesions with proper shoe gear, offloading pads, and insoles.     Performed a biopsy of the right hallux nail.  Discussed based on biopsy results we will either try topical removal      Evaluated the patient. Discussed treatment options with the patient.  Discussed with patient proper care and hygiene for their feet.  Patient tolerated procedures well, without incident.    Instrumentation used includes nail nippers and electric  where appropriate.  Procedure: (11632 Debridement of toenails 6-10) Surgically debrided and mechanically reduced 6 or more toenails.Hemmorhage occurred none.Nails that were debrided appeared dystrophic and caused the patient pain in shoe gear.Nails 5 Left & 5 Right.     Evaluated patient. Discussed treatment options with patient.  Discussed proper hygiene and care for feet as well as use of emollient creams (ie Urea based creams)  Answered all patient questions. Discussed offloading hyperkeratotic lesions with proper shoe gear, offloading pads, and insoles.     Patient was instructed to call the office or on-call podiatric physician immediately with any issues or concerns before the next scheduled visit. Patient to follow-up in clinic in 3 months      Amber Lin DPM, D.JOSE E SMYTH  Diplomat, American Board of Foot and Ankle Surgery  Certified in Foot and Rearfoot/Ankle Reconstruction  Fellow of the American College of Foot and Ankle Surgeons  Fellowship Trained Foot and Ankle Surgeon   [facility]     11/25/2024    7:43 AM         [1]   Allergies  Allergen Reactions    Adhesive Tape RASH     Pulled the hair off

## 2025-02-14 ENCOUNTER — TELEPHONE (OUTPATIENT)
Dept: NEPHROLOGY | Facility: CLINIC | Age: 80
End: 2025-02-14

## 2025-02-17 ENCOUNTER — TELEPHONE (OUTPATIENT)
Dept: NEPHROLOGY | Facility: CLINIC | Age: 80
End: 2025-02-17

## 2025-02-17 NOTE — TELEPHONE ENCOUNTER
Your kidney function has improved.  Please follow-up as scheduled and we will review in more detail.   Written by Godwin Campa MD on 2/10/2025 11:08 AM CST

## 2025-03-12 ENCOUNTER — OFFICE VISIT (OUTPATIENT)
Dept: NEPHROLOGY | Facility: CLINIC | Age: 80
End: 2025-03-12
Payer: MEDICARE

## 2025-03-12 VITALS
HEIGHT: 63 IN | HEART RATE: 77 BPM | BODY MASS INDEX: 31.89 KG/M2 | DIASTOLIC BLOOD PRESSURE: 50 MMHG | WEIGHT: 180 LBS | SYSTOLIC BLOOD PRESSURE: 124 MMHG

## 2025-03-12 DIAGNOSIS — N18.32 STAGE 3B CHRONIC KIDNEY DISEASE (HCC): Primary | ICD-10-CM

## 2025-03-12 PROCEDURE — 99214 OFFICE O/P EST MOD 30 MIN: CPT | Performed by: INTERNAL MEDICINE

## 2025-03-12 NOTE — PROGRESS NOTES
03/12/25        Patient: Valery Casillas   YOB: 1945   Date of Visit: 3/12/2025       Dear  Dr. Thom MD,      Thank you for referring Valery Casillas to my practice.  Please find my assessment and plan below.      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  Overall the patient states she is doing well without any chest pain, shortness of breath, GI or urinary tract symptoms.  The right eye remains stable.  She checks her blood pressures daily and they have been under good control.    On physical exam her blood pressure 124/50 with a pulse of 77 she weighed 180 pounds.  Her neck was supple without JVD.  Lungs were clear.  Heart revealed a regular rate and rhythm with an S4 but no gallops, murmurs or rubs.  Abdomen was soft, flat, nontender without organomegaly, masses or bruits.  Extremities revealed trace edema bilaterally.    I reviewed her most recent labs done on February 10, 2025.  Creatinine if anything is slightly better down to 1.40 now with an estimated GFR 38 cc/min.  Electrolytes were good.  Hemoglobin 12.6.    I therefore reassured the patient that if anything her renal function is a bit better.  She knows to maintain adequate hydration.  Avoid nonsteroidals.  Keep blood pressures under good control.  She will continue to do CBC and renal panels quarterly.  I will see her again in 6 months for follow-up or sooner if clinically indicated.    Thank you again for allowing me to participate in the care of your patient.  If you have any questions please feel free to call.           Sincerely,   Godwin Campa MD   [facility] [place]  [address]  [facility] [address]    Document electronically generated by:  Godwin  MD Lokesh

## 2025-03-13 ENCOUNTER — OFFICE VISIT (OUTPATIENT)
Dept: DERMATOLOGY CLINIC | Facility: CLINIC | Age: 80
End: 2025-03-13
Payer: MEDICARE

## 2025-03-13 DIAGNOSIS — D22.9 MULTIPLE NEVI: ICD-10-CM

## 2025-03-13 DIAGNOSIS — Z85.820 ENCOUNTER FOR FOLLOW-UP SURVEILLANCE OF MELANOMA: ICD-10-CM

## 2025-03-13 DIAGNOSIS — L57.0 AK (ACTINIC KERATOSIS): Primary | ICD-10-CM

## 2025-03-13 DIAGNOSIS — L82.1 SEBORRHEIC KERATOSES: ICD-10-CM

## 2025-03-13 DIAGNOSIS — Z86.018 HISTORY OF DYSPLASTIC NEVUS: ICD-10-CM

## 2025-03-13 DIAGNOSIS — D23.9 BENIGN NEOPLASM OF SKIN, UNSPECIFIED LOCATION: ICD-10-CM

## 2025-03-13 DIAGNOSIS — L81.4 LENTIGO: ICD-10-CM

## 2025-03-13 DIAGNOSIS — Z13.89 ENCOUNTER FOR SURVEILLANCE OF ABNORMAL NEVI: ICD-10-CM

## 2025-03-13 DIAGNOSIS — Z08 ENCOUNTER FOR FOLLOW-UP SURVEILLANCE OF MELANOMA: ICD-10-CM

## 2025-03-13 DIAGNOSIS — B37.2 INTERTRIGINOUS CANDIDIASIS: ICD-10-CM

## 2025-03-13 DIAGNOSIS — C69.90 OCULAR MELANOMA, UNSPECIFIED LATERALITY (HCC): ICD-10-CM

## 2025-03-13 PROCEDURE — 17003 DESTRUCT PREMALG LES 2-14: CPT | Performed by: DERMATOLOGY

## 2025-03-13 PROCEDURE — 17000 DESTRUCT PREMALG LESION: CPT | Performed by: DERMATOLOGY

## 2025-03-13 PROCEDURE — 99213 OFFICE O/P EST LOW 20 MIN: CPT | Performed by: DERMATOLOGY

## 2025-03-17 NOTE — PROGRESS NOTES
Valery Casillas is a 79 year old female.  HPI:     CC:    Chief Complaint   Patient presents with    Melanoma     LOV 24  Pt presents for 6 month f/u and skin check  Has personal Hx of Ak's and melanoma  Denies any concerns at this time.         Allergies:  Adhesive tape    HISTORY:    Past Medical History:    Allergic rhinitis    Anxiety state, unspecified    Carotid artery disease    Cataracts, both eyes    OU    Depression    Kidney disease, chronic, stage III (GFR 30-59 ml/min) (HCC)    Melanoma, choroid, right eye (HCC)    OD, Radiation plaque for 7 days; per NG    Other and unspecified hyperlipidemia    Retinal scar of right eye    \"Retinal scarring due to melanoma/ radiation\"    Unspecified essential hypertension      Past Surgical History:   Procedure Laterality Date    Cholecystectomy      Colonoscopy      Hysterectomy      Other surgical history      Melanoma of Choroid: Radiation plaque for 7 days; treated      Family History   Problem Relation Age of Onset    Heart Attack Father 54        MI; per NG    Stroke Mother 78        per NG    Breast Cancer Mother 76        per NG    Heart Disorder Mother         CHF; per NG    Hypertension Mother         per NG    Stroke Maternal Grandmother 69        Cause of death; per NG    Diabetes Other         Aunt; per NG    Lipids Other         Family H/o Hyperlipidemia; per NG    Hypertension Maternal Uncle         per NG    Breast Cancer Paternal Aunt 70    Glaucoma Neg     Macular degeneration Neg       Social History     Socioeconomic History    Marital status:    Tobacco Use    Smoking status: Former     Current packs/day: 0.00     Average packs/day: 0.5 packs/day for 14.0 years (7.0 ttl pk-yrs)     Types: Cigarettes     Start date: 1965     Quit date: 1979     Years since quittin.2    Smokeless tobacco: Never   Vaping Use    Vaping status: Never Used   Substance and Sexual Activity    Alcohol use: No     Alcohol/week: 0.0  standard drinks of alcohol    Drug use: No    Sexual activity: Not Currently     Partners: Male   Other Topics Concern    Caffeine Concern Yes     Comment: Coffee, 2 cups; per NG    Pt has a pacemaker No    Pt has a defibrillator No    Reaction to local anesthetic No        Current Outpatient Medications   Medication Sig Dispense Refill    amLODIPine 2.5 MG Oral Tab Take 1 tablet (2.5 mg total) by mouth daily. 90 tablet 3    citalopram 10 MG Oral Tab Take 1 tablet (10 mg total) by mouth once daily. 90 tablet 3    metoprolol succinate ER 25 MG Oral Tablet 24 Hr Take 1 tablet (25 mg total) by mouth daily. 90 tablet 3    pravastatin 40 MG Oral Tab Take 1 tablet (40 mg total) by mouth nightly. 90 tablet 3    triamterene-hydroCHLOROthiazide (DYAZIDE) 37.5-25 MG Oral Cap 1capsule 1 times a week 12 capsule 3    aspirin 325 MG Oral Tab Take 0.5 tablets (162.5 mg total) by mouth daily. 90 tablet 1    Calcium-Phosphorus-Vitamin D (CITRACAL +D3 OR) Take 1 tablet by mouth daily.      Vitamin B-12 (VITAMIN B12) 1000 MCG Oral Tab Take 1 tablet (1,000 mcg total) by mouth daily.      loratadine (CLARITIN) 10 MG Oral Tab Take 1 tablet (10 mg total) by mouth daily.      folic acid (FOLVITE) 400 MCG Oral Tab Take 1 tablet (400 mcg total) by mouth daily.      Acetaminophen (TYLENOL EXTRA STRENGTH OR) Take  by mouth.      Multiple Vitamins-Minerals (CENTRUM SILVER) Oral Tab Take 1 tablet by mouth daily.         Allergies:   Allergies   Allergen Reactions    Adhesive Tape RASH     Pulled the hair off       Past Medical History:    Allergic rhinitis    Anxiety state, unspecified    Carotid artery disease    Cataracts, both eyes    OU    Depression    Kidney disease, chronic, stage III (GFR 30-59 ml/min) ([facility])    Melanoma, choroid, right eye ([facility])    OD, Radiation plaque for 7 days; per NG    Other and unspecified hyperlipidemia    Retinal scar of right eye    \"Retinal scarring due to melanoma/ radiation\"    Unspecified essential  hypertension     Past Surgical History:   Procedure Laterality Date    Cholecystectomy      Colonoscopy      Hysterectomy  1992    Other surgical history      Melanoma of Choroid: Radiation plaque for 7 days; treated     Social History     Socioeconomic History    Marital status:      Spouse name: Not on file    Number of children: Not on file    Years of education: Not on file    Highest education level: Not on file   Occupational History    Not on file   Tobacco Use    Smoking status: Former     Current packs/day: 0.00     Average packs/day: 0.5 packs/day for 14.0 years (7.0 ttl pk-yrs)     Types: Cigarettes     Start date: 1965     Quit date: 1979     Years since quittin.2    Smokeless tobacco: Never   Vaping Use    Vaping status: Never Used   Substance and Sexual Activity    Alcohol use: No     Alcohol/week: 0.0 standard drinks of alcohol    Drug use: No    Sexual activity: Not Currently     Partners: Male   Other Topics Concern     Service Not Asked    Blood Transfusions Not Asked    Caffeine Concern Yes     Comment: Coffee, 2 cups; per NG    Occupational Exposure Not Asked    Hobby Hazards Not Asked    Sleep Concern Not Asked    Stress Concern Not Asked    Weight Concern Not Asked    Special Diet Not Asked    Back Care Not Asked    Exercise Not Asked    Bike Helmet Not Asked    Seat Belt Not Asked    Self-Exams Not Asked    Grew up on a farm Not Asked    History of tanning Not Asked    Outdoor occupation Not Asked    Pt has a pacemaker No    Pt has a defibrillator No    Breast feeding Not Asked    Reaction to local anesthetic No   Social History Narrative    Not on file     Social Drivers of Health     Food Insecurity: Not on file   Transportation Needs: Not on file   Stress: Not on file   Housing Stability: Not on file     Family History   Problem Relation Age of Onset    Heart Attack Father 54        MI; per NG    Stroke Mother 78        per NG    Breast Cancer Mother 76         per NG    Heart Disorder Mother         CHF; per NG    Hypertension Mother         per NG    Stroke Maternal Grandmother 69        Cause of death; per NG    Diabetes Other         Aunt; per NG    Lipids Other         Family H/o Hyperlipidemia; per NG    Hypertension Maternal Uncle         per NG    Breast Cancer Paternal Aunt 70    Glaucoma Neg     Macular degeneration Neg        There were no vitals filed for this visit.    HPI:    Chief Complaint   Patient presents with    Melanoma     LOV 5/02/24  Pt presents for 6 month f/u and skin check  Has personal Hx of Ak's and melanoma  Denies any concerns at this time.     Here for follow-up history of dysplastic nevi AK's, ocular melanoma no particular concerns.  Has been active in choir again.  Has not noted any particular changing lesions.  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  No particular lesions of concern    Her melanoma has been stable.  Nothing changing    Concerned as renal disease worsening stage III presently.  Able to participate in more activities at [facility]    History of Present Illness        Patient attributes health decline to stress= her  remains at [place]    Past notes/ records and appropriate/relevant lab results including pathology and past body maps reviewed. Updated and new information noted in current visit.     Patient presents with concerns above.  Overall doing well, stressed.    Patient has been in their usual state of health.  History, medications, allergies reviewed as noted.      ROS:  Denies any other systemic complaints.  No new or changeing lesions other than noted above. No fevers, chills, night sweats, unusual sun sensitivity.  No other skin complaints.        History, medications, allergies reviewed as noted.        Physical Examination:     Well-developed well-nourished patient alert oriented in no acute distress.  Exam total-body performed, including scalp, head, neck, face,nails, hair, external eyes, including conjunctival mucosa, eyelids, lips external ears, back, chest,/ breasts, axillae,  abdomen, arms, legs, palms.     Multiple light to medium brown, well marginated, uniformly pigmented, macules and papules 6 mm and less scattered on exam. pigmented lesions examined with dermoscopy benign-appearing patterns.     Waxy tannish keratotic papules scattered, cherry-red vascular papules scattered.    See map today's date for lesions noted .      Otherwise remarkable for lesions as noted on map.  See details of examination  See Assessment /Plan for additional history and physical exam also:    Assessment / plan:    No orders of the defined types were placed in this encounter.      Meds & Refills for this Visit:  Requested Prescriptions      No prescriptions requested or ordered in this encounter         Encounter Diagnoses   Name Primary?    AK (actinic keratosis) Yes    Seborrheic keratoses     Multiple nevi     Lentigo     Benign neoplasm of skin, unspecified location     Encounter for follow-up surveillance of melanoma     Encounter for surveillance of abnormal nevi     History of dysplastic nevus     Ocular melanoma, unspecified laterality (HCC)     Intertriginous candidiasis        See details on map.      Remarkable for:     Physical Exam        Results            Fall risk assessment:  Given the results of the fall risk questionnaire given today.   Suggest:   Make sure there is adequate lighting.  Eliminate throw rugs or possible sources of tripping & falling  Consider grab bars on the shower & toilet.  Hand rails on all stair cases  Ambulatory assistance devices such as cane or walker as indicate.  To ensure home safety measures.    Patient seen for follow-up long-term monitoring, treatment of  Ocular melanoma,  dysplastic nevi, numerous pigmented lesions.  Patient at high risk for cutaneous melanoma.  History of actinic keratoses as well.  Plan of care:  ongoing surveillance, monitoring including regular follow-up due to longer term risk of recurrence, new lesions.  See previous notes.  There is a longitudinal care relationship with me, the care plan reflects the ongoing nature of the continuous relationship of care, and the medical record indicates that there is ongoing treatment of a serious/complex medical condition which I am currently managing.  is Applicable      Assessment & Plan          Erythematous scaling keratotic papules noted at sites noted on map  Actinic Keratoses.  Precancerous nature discussed. Sun protection, sunscreen/ blocks encouraged Lesions treated with cryo- .  Biopsy if not resolved.      Right cheek    Small cysts left lateral orbital rim    Hemangioma at left brow monitor    No new suspicious lesions.  Larger lentigo left lateral upper arm continue monitoring larger lentigo at left lateral upper arm monitor carefully no atypical pattern on dermoscopy  Recheck at follow-up in 6 months    left breast  Compound lentiginous nevus.  10/22 no recurrence    Extensive nevi history of dysplastic nevi nevi.  No new suspicious pigmented lesions.    No significant changes in other  skin lesions    Previous waxy tan keratotic of the right flank stable.  Extensive benign keratoses noted.  Increasing number of lesions.  Asymptomatic.  Observation.  No treatment at this time.    History of omphalitis, intertrigo stable intertrigo has been less problematic recently.  Continues to follow for her ocular melanoma   no new suspicious lesions      Umbilical inflammation in the past resolved is remained clear.  Intertrigo stable.   Discussed topical and oral medications. fluconazole again if this flares up.  If omphalitis flares resume mupirocin ketoconazole regularly.      stable nevus nevi stable overall no  significant changes  Macule 5 mm with darker brown central pigmentation terminal hairs at left anterior thigh observe.  Unchanged stable no other new suspicious lesions      History of ocular melanoma stable continues to follow-up at [facility] annually overall has been stable    Continue regular follow-up no active AK's.  History of AK's continue careful sun protection stable     History of dysplastic nevi.  No recurrence of atypical nevi.  No new suspicious lesions.  Continue to observe papular lesion at right medial cheek.no change    Extensive benign keratoses reassurance recheck 6 months    Please refer to map for specific lesions.  See additional diagnoses.  Pros cons of various therapies, risks benefits discussed.Pathophysiology discussed with patient.  Therapeutic options reviewed.  See  Medications in grid.  Instructions reviewed at length.    Benign nevi, seborrheic  keratoses, cherry angiomas:  Reassurance regarding other benign skin lesions.    Monitor for new or changing lesions. Signs and symptoms of skin cancer, ABCDE's of melanoma ( additional information available at AAD.org, skincancer.org) Encourage Sunscreen (broad-spectrum, ideally mineral-based-UVA/UVB -SPF 30 or higher) use encouraged, sun protection/sun protective clothing, self exams reviewed Followup as noted RTC ---routine checkup 6 mos -one year or p.r.n.    Encounter Times   Including precharting, reviewing chart, prior notes obtaining history: 10 minutes, medical exam :10 minutes, notes on body map, plan, counseling 10minutes My total time spent caring for the patient on the day of the encounter: 30 minutes     The patient indicates understanding of these issues and agrees to the plan.  The patient is asked to return as noted in follow-up/ above.    This note was generated using Dragon voice recognition software.  Please contact me regarding any confusion resulting from errors in recognition..  Note to patient and family: The 21st Century  Cures Act makes medical notes like these available to patients. However, be advised this is a medical document. It is intended as [id number] communication and monitoring of a patient's care needs. It is written in medical language and may contain abbreviations or verbiage that are unfamiliar. It may appear blunt or direct. Medical documents are intended to carry relevant information, facts as evident and the clinical opinion of the practitioner.

## 2025-05-21 ENCOUNTER — OFFICE VISIT (OUTPATIENT)
Dept: FAMILY MEDICINE CLINIC | Facility: CLINIC | Age: 80
End: 2025-05-21
Payer: MEDICARE

## 2025-05-21 ENCOUNTER — LAB ENCOUNTER (OUTPATIENT)
Dept: LAB | Age: 80
End: 2025-05-21
Attending: STUDENT IN AN ORGANIZED HEALTH CARE EDUCATION/TRAINING PROGRAM
Payer: MEDICARE

## 2025-05-21 VITALS
OXYGEN SATURATION: 96 % | WEIGHT: 183 LBS | DIASTOLIC BLOOD PRESSURE: 70 MMHG | HEIGHT: 63 IN | HEART RATE: 58 BPM | BODY MASS INDEX: 32.43 KG/M2 | SYSTOLIC BLOOD PRESSURE: 132 MMHG

## 2025-05-21 DIAGNOSIS — I10 ESSENTIAL HYPERTENSION: ICD-10-CM

## 2025-05-21 DIAGNOSIS — N18.31 STAGE 3A CHRONIC KIDNEY DISEASE (HCC): ICD-10-CM

## 2025-05-21 DIAGNOSIS — F32.A MILD DEPRESSION: ICD-10-CM

## 2025-05-21 DIAGNOSIS — E78.5 HYPERLIPIDEMIA, UNSPECIFIED HYPERLIPIDEMIA TYPE: ICD-10-CM

## 2025-05-21 DIAGNOSIS — I10 ESSENTIAL HYPERTENSION: Primary | ICD-10-CM

## 2025-05-21 LAB
ALBUMIN SERPL-MCNC: 4.7 G/DL (ref 3.2–4.8)
ALBUMIN/GLOB SERPL: 1.6 {RATIO} (ref 1–2)
ALP LIVER SERPL-CCNC: 75 U/L (ref 55–142)
ALT SERPL-CCNC: 19 U/L (ref 10–49)
ANION GAP SERPL CALC-SCNC: 13 MMOL/L (ref 0–18)
AST SERPL-CCNC: 38 U/L (ref ?–34)
BASOPHILS # BLD AUTO: 0.06 X10(3) UL (ref 0–0.2)
BASOPHILS NFR BLD AUTO: 1.1 %
BILIRUB SERPL-MCNC: 0.4 MG/DL (ref 0.2–1.1)
BILIRUB UR QL: NEGATIVE
BUN BLD-MCNC: 22 MG/DL (ref 9–23)
BUN/CREAT SERPL: 15.4 (ref 10–20)
CALCIUM BLD-MCNC: 9.4 MG/DL (ref 8.7–10.4)
CHLORIDE SERPL-SCNC: 105 MMOL/L (ref 98–112)
CHOLEST SERPL-MCNC: 183 MG/DL (ref ?–200)
CLARITY UR: CLEAR
CO2 SERPL-SCNC: 23 MMOL/L (ref 21–32)
CREAT BLD-MCNC: 1.43 MG/DL (ref 0.55–1.02)
DEPRECATED RDW RBC AUTO: 42 FL (ref 35.1–46.3)
EGFRCR SERPLBLD CKD-EPI 2021: 37 ML/MIN/1.73M2 (ref 60–?)
EOSINOPHIL # BLD AUTO: 0.25 X10(3) UL (ref 0–0.7)
EOSINOPHIL NFR BLD AUTO: 4.8 %
ERYTHROCYTE [DISTWIDTH] IN BLOOD BY AUTOMATED COUNT: 12.3 % (ref 11–15)
FASTING PATIENT LIPID ANSWER: YES
FASTING STATUS PATIENT QL REPORTED: YES
GLOBULIN PLAS-MCNC: 2.9 G/DL (ref 2–3.5)
GLUCOSE BLD-MCNC: 98 MG/DL (ref 70–99)
GLUCOSE UR-MCNC: NORMAL MG/DL
HCT VFR BLD AUTO: 39.3 % (ref 35–48)
HDLC SERPL-MCNC: 53 MG/DL (ref 40–59)
HGB BLD-MCNC: 12.6 G/DL (ref 12–16)
HGB UR QL STRIP.AUTO: NEGATIVE
IMM GRANULOCYTES # BLD AUTO: 0.01 X10(3) UL (ref 0–1)
IMM GRANULOCYTES NFR BLD: 0.2 %
KETONES UR-MCNC: NEGATIVE MG/DL
LDLC SERPL CALC-MCNC: 102 MG/DL (ref ?–100)
LEUKOCYTE ESTERASE UR QL STRIP.AUTO: NEGATIVE
LYMPHOCYTES # BLD AUTO: 1.44 X10(3) UL (ref 1–4)
LYMPHOCYTES NFR BLD AUTO: 27.6 %
MCH RBC QN AUTO: 29.9 PG (ref 26–34)
MCHC RBC AUTO-ENTMCNC: 32.1 G/DL (ref 31–37)
MCV RBC AUTO: 93.1 FL (ref 80–100)
MONOCYTES # BLD AUTO: 0.41 X10(3) UL (ref 0.1–1)
MONOCYTES NFR BLD AUTO: 7.9 %
NEUTROPHILS # BLD AUTO: 3.05 X10 (3) UL (ref 1.5–7.7)
NEUTROPHILS # BLD AUTO: 3.05 X10(3) UL (ref 1.5–7.7)
NEUTROPHILS NFR BLD AUTO: 58.4 %
NITRITE UR QL STRIP.AUTO: NEGATIVE
NONHDLC SERPL-MCNC: 130 MG/DL (ref ?–130)
OSMOLALITY SERPL CALC.SUM OF ELEC: 295 MOSM/KG (ref 275–295)
PH UR: 6.5 [PH] (ref 5–8)
PLATELET # BLD AUTO: 339 10(3)UL (ref 150–450)
POTASSIUM SERPL-SCNC: 4.8 MMOL/L (ref 3.5–5.1)
PROT SERPL-MCNC: 7.6 G/DL (ref 5.7–8.2)
PROT UR-MCNC: NEGATIVE MG/DL
RBC # BLD AUTO: 4.22 X10(6)UL (ref 3.8–5.3)
SODIUM SERPL-SCNC: 141 MMOL/L (ref 136–145)
SP GR UR STRIP: 1.01 (ref 1–1.03)
TRIGL SERPL-MCNC: 159 MG/DL (ref 30–149)
TSI SER-ACNC: 0.9 UIU/ML (ref 0.55–4.78)
UROBILINOGEN UR STRIP-ACNC: NORMAL
VLDLC SERPL CALC-MCNC: 27 MG/DL (ref 0–30)
WBC # BLD AUTO: 5.2 X10(3) UL (ref 4–11)

## 2025-05-21 PROCEDURE — 36415 COLL VENOUS BLD VENIPUNCTURE: CPT

## 2025-05-21 PROCEDURE — G2211 COMPLEX E/M VISIT ADD ON: HCPCS | Performed by: STUDENT IN AN ORGANIZED HEALTH CARE EDUCATION/TRAINING PROGRAM

## 2025-05-21 PROCEDURE — 80061 LIPID PANEL: CPT

## 2025-05-21 PROCEDURE — 84443 ASSAY THYROID STIM HORMONE: CPT

## 2025-05-21 PROCEDURE — 85025 COMPLETE CBC W/AUTO DIFF WBC: CPT

## 2025-05-21 PROCEDURE — 81003 URINALYSIS AUTO W/O SCOPE: CPT

## 2025-05-21 PROCEDURE — 80053 COMPREHEN METABOLIC PANEL: CPT

## 2025-05-21 PROCEDURE — 99214 OFFICE O/P EST MOD 30 MIN: CPT | Performed by: STUDENT IN AN ORGANIZED HEALTH CARE EDUCATION/TRAINING PROGRAM

## 2025-05-21 NOTE — PROGRESS NOTES
HPI:      Patient ID: Valery Casillas is a 80 year old female.    HPI  Pt presenting for follow-up.    H/o HTN  Denies any issues or concerns  Denies chest pain, leg swelling, palpitations, dizziness    Mood stable, denies SH/SI/HI    Review of Systems   A comprehensive 10 point review of systems was completed.  Pertinent positives and negatives noted in the the HPI.     Current Medications[1]  Allergies:Allergies[2]   Vitals:    05/21/25 0922   BP: 132/70   Pulse: 58   SpO2: 96%   Weight: 183 lb (83 kg)   Height: 5' 3\" (1.6 m)       Body mass index is 32.42 kg/m².   PHYSICAL EXAM:   Physical Exam  Vitals reviewed.   Constitutional:       General: She is not in acute distress.     Appearance: Normal appearance. She is well-developed.   HENT:      Head: Normocephalic and atraumatic.      Right Ear: Tympanic membrane, ear canal and external ear normal.      Left Ear: Tympanic membrane, ear canal and external ear normal.   Eyes:      Conjunctiva/sclera: Conjunctivae normal.   Neck:      Thyroid: No thyroid mass or thyroid tenderness.   Cardiovascular:      Rate and Rhythm: Normal rate and regular rhythm.      Pulses: Normal pulses.      Heart sounds: Normal heart sounds, S1 normal and S2 normal. No murmur heard.  Pulmonary:      Effort: Pulmonary effort is normal. No respiratory distress.      Breath sounds: Normal breath sounds. No wheezing, rhonchi or rales.   Abdominal:      General: Bowel sounds are normal.      Palpations: Abdomen is soft.      Tenderness: There is no abdominal tenderness. There is no guarding or rebound.   Musculoskeletal:      Cervical back: Normal range of motion and neck supple. No muscular tenderness.      Right lower leg: No edema.      Left lower leg: No edema.   Lymphadenopathy:      Cervical: No cervical adenopathy.   Skin:     General: Skin is warm and dry.      Coloration: Skin is not jaundiced.   Neurological:      General: No focal deficit present.      Mental Status: She is alert  and oriented to person, place, and time. Mental status is at baseline.   Psychiatric:         Attention and Perception: Attention normal.         Mood and Affect: Mood normal.         Behavior: Behavior normal. Behavior is cooperative.         Cognition and Memory: Cognition normal.             ASSESSMENT/PLAN:   1. Essential hypertension  In-office BP stable, pt otherwise asymptomatic  - discussed benefits of DASH diet and daily activity  - continue BP monitoring  - continue daily medication  - will check labwork  - advised to call if BP is persistently elevated  - TSH W Reflex To Free T4; Future  - Comp Metabolic Panel (14); Future  - Lipid Panel; Future  - CBC With Differential With Platelet; Future  - Urinalysis, Routine; Future    2. Hyperlipidemia, unspecified hyperlipidemia type  Stable, continue dosing  - TSH W Reflex To Free T4; Future  - Comp Metabolic Panel (14); Future  - Lipid Panel; Future  - CBC With Differential With Platelet; Future  - Urinalysis, Routine; Future    3. Stage 3a chronic kidney disease (HCC)  Continue to keep well-hydrated and avoid NSAIDs like Advil, Aleve, ibuprofen, naproxen.  May take only Tylenol arthritis if needed for aches and pains.  No chest pain, palpitations, shortness of breath or lower extremity edema at this time.  - TSH W Reflex To Free T4; Future  - Comp Metabolic Panel (14); Future  - Lipid Panel; Future  - CBC With Differential With Platelet; Future  - Urinalysis, Routine; Future    4. Mild depression  Stable on Celexa dosing  - discussed red flags for urgent reevaluation    Pt verbalized understanding and agrees with plan.      Orders Placed This Encounter   Procedures    TSH W Reflex To Free T4    Comp Metabolic Panel (14)    Lipid Panel    CBC With Differential With Platelet    Urinalysis, Routine       Meds This Visit:  Requested Prescriptions      No prescriptions requested or ordered in this encounter       Imaging & Referrals:  None         ID#[id number]          [1]   Current Outpatient Medications   Medication Sig Dispense Refill    amLODIPine 2.5 MG Oral Tab Take 1 tablet (2.5 mg total) by mouth daily. 90 tablet 3    citalopram 10 MG Oral Tab Take 1 tablet (10 mg total) by mouth once daily. 90 tablet 3    metoprolol succinate ER 25 MG Oral Tablet 24 Hr Take 1 tablet (25 mg total) by mouth daily. 90 tablet 3    pravastatin 40 MG Oral Tab Take 1 tablet (40 mg total) by mouth nightly. 90 tablet 3    triamterene-hydroCHLOROthiazide (DYAZIDE) 37.5-25 MG Oral Cap 1capsule 1 times a week 12 capsule 3    aspirin 325 MG Oral Tab Take 0.5 tablets (162.5 mg total) by mouth daily. 90 tablet 1    Calcium-Phosphorus-Vitamin D (CITRACAL +D3 OR) Take 1 tablet by mouth daily.      Vitamin B-12 (VITAMIN B12) 1000 MCG Oral Tab Take 1 tablet (1,000 mcg total) by mouth daily.      loratadine (CLARITIN) 10 MG Oral Tab Take 1 tablet (10 mg total) by mouth daily.      folic acid (FOLVITE) 400 MCG Oral Tab Take 1 tablet (400 mcg total) by mouth daily.      Acetaminophen (TYLENOL EXTRA STRENGTH OR) Take  by mouth.      Multiple Vitamins-Minerals (CENTRUM SILVER) Oral Tab Take 1 tablet by mouth daily.       [2]   Allergies  Allergen Reactions    Adhesive Tape RASH     Pulled the hair off

## 2025-06-09 ENCOUNTER — LAB ENCOUNTER (OUTPATIENT)
Dept: LAB | Facility: HOSPITAL | Age: 80
End: 2025-06-09
Attending: INTERNAL MEDICINE
Payer: MEDICARE

## 2025-06-09 ENCOUNTER — OFFICE VISIT (OUTPATIENT)
Dept: PODIATRY CLINIC | Facility: CLINIC | Age: 80
End: 2025-06-09
Payer: MEDICARE

## 2025-06-09 DIAGNOSIS — M79.674 PAIN DUE TO ONYCHOMYCOSIS OF TOENAIL OF RIGHT FOOT: Primary | ICD-10-CM

## 2025-06-09 DIAGNOSIS — N18.32 STAGE 3B CHRONIC KIDNEY DISEASE (HCC): ICD-10-CM

## 2025-06-09 DIAGNOSIS — B35.1 PAIN DUE TO ONYCHOMYCOSIS OF TOENAIL OF RIGHT FOOT: Primary | ICD-10-CM

## 2025-06-09 DIAGNOSIS — I73.9 PVD (PERIPHERAL VASCULAR DISEASE): ICD-10-CM

## 2025-06-09 DIAGNOSIS — B35.1 ONYCHOMYCOSIS: ICD-10-CM

## 2025-06-09 LAB
ALBUMIN SERPL-MCNC: 4.5 G/DL (ref 3.2–4.8)
ANION GAP SERPL CALC-SCNC: 12 MMOL/L (ref 0–18)
BASOPHILS # BLD AUTO: 0.06 X10(3) UL (ref 0–0.2)
BASOPHILS NFR BLD AUTO: 1.2 %
BUN BLD-MCNC: 20 MG/DL (ref 9–23)
BUN/CREAT SERPL: 12.3 (ref 10–20)
CALCIUM BLD-MCNC: 9.5 MG/DL (ref 8.7–10.4)
CHLORIDE SERPL-SCNC: 105 MMOL/L (ref 98–112)
CO2 SERPL-SCNC: 26 MMOL/L (ref 21–32)
CREAT BLD-MCNC: 1.63 MG/DL (ref 0.55–1.02)
DEPRECATED RDW RBC AUTO: 43.1 FL (ref 35.1–46.3)
EGFRCR SERPLBLD CKD-EPI 2021: 31 ML/MIN/1.73M2 (ref 60–?)
EOSINOPHIL # BLD AUTO: 0.28 X10(3) UL (ref 0–0.7)
EOSINOPHIL NFR BLD AUTO: 5.4 %
ERYTHROCYTE [DISTWIDTH] IN BLOOD BY AUTOMATED COUNT: 12.5 % (ref 11–15)
GLUCOSE BLD-MCNC: 109 MG/DL (ref 70–99)
HCT VFR BLD AUTO: 39.9 % (ref 35–48)
HGB BLD-MCNC: 12.7 G/DL (ref 12–16)
IMM GRANULOCYTES # BLD AUTO: 0.01 X10(3) UL (ref 0–1)
IMM GRANULOCYTES NFR BLD: 0.2 %
LYMPHOCYTES # BLD AUTO: 1.58 X10(3) UL (ref 1–4)
LYMPHOCYTES NFR BLD AUTO: 30.6 %
MCH RBC QN AUTO: 29.7 PG (ref 26–34)
MCHC RBC AUTO-ENTMCNC: 31.8 G/DL (ref 31–37)
MCV RBC AUTO: 93.2 FL (ref 80–100)
MONOCYTES # BLD AUTO: 0.38 X10(3) UL (ref 0.1–1)
MONOCYTES NFR BLD AUTO: 7.4 %
NEUTROPHILS # BLD AUTO: 2.85 X10 (3) UL (ref 1.5–7.7)
NEUTROPHILS # BLD AUTO: 2.85 X10(3) UL (ref 1.5–7.7)
NEUTROPHILS NFR BLD AUTO: 55.2 %
OSMOLALITY SERPL CALC.SUM OF ELEC: 299 MOSM/KG (ref 275–295)
PHOSPHATE SERPL-MCNC: 3.4 MG/DL (ref 2.4–5.1)
PLATELET # BLD AUTO: 369 10(3)UL (ref 150–450)
POTASSIUM SERPL-SCNC: 4.2 MMOL/L (ref 3.5–5.1)
RBC # BLD AUTO: 4.28 X10(6)UL (ref 3.8–5.3)
SODIUM SERPL-SCNC: 143 MMOL/L (ref 136–145)
WBC # BLD AUTO: 5.2 X10(3) UL (ref 4–11)

## 2025-06-09 PROCEDURE — 36415 COLL VENOUS BLD VENIPUNCTURE: CPT

## 2025-06-09 PROCEDURE — 85025 COMPLETE CBC W/AUTO DIFF WBC: CPT

## 2025-06-09 PROCEDURE — 80069 RENAL FUNCTION PANEL: CPT

## 2025-06-09 NOTE — PROGRESS NOTES
Reason for Visit      Valery Casillas is a 80 year old female presents today complaining of bilateral routine footcare.     History of Present Illness     Patient presents to clinic today after last being seen by podiatry on 8/8/2024 for routine footcare.  Patient is not diabetic but does have a history of peripheral vascular disease and is unable to debride her nails herself.  Patient denies any open lesions at this time.  Patient presents concerned with appearance of her right hallux nail.  Patient states that it is always been abnormal and she is interested in having it removed.    Patient returns to clinic today after last being seen on November 25, 2024 for routine footcare.  At her last office visit patient had a right hallux nail biopsied which came back negative for fungal bacteria or mold.    Patient was last seen 2/10/25.  Patient denies any changes in her medical history or medications since her last office visit.    The following portions of the patient's history were reviewed and updated as appropriate: allergies, current medications, past family history, past medical history, past social history, past surgical history and problem list.    Allergies[1]      Current Outpatient Medications:     amLODIPine 2.5 MG Oral Tab, Take 1 tablet (2.5 mg total) by mouth daily., Disp: 90 tablet, Rfl: 3    citalopram 10 MG Oral Tab, Take 1 tablet (10 mg total) by mouth once daily., Disp: 90 tablet, Rfl: 3    metoprolol succinate ER 25 MG Oral Tablet 24 Hr, Take 1 tablet (25 mg total) by mouth daily., Disp: 90 tablet, Rfl: 3    pravastatin 40 MG Oral Tab, Take 1 tablet (40 mg total) by mouth nightly., Disp: 90 tablet, Rfl: 3    triamterene-hydroCHLOROthiazide (DYAZIDE) 37.5-25 MG Oral Cap, 1capsule 1 times a week, Disp: 12 capsule, Rfl: 3    aspirin 325 MG Oral Tab, Take 0.5 tablets (162.5 mg total) by mouth daily., Disp: 90 tablet, Rfl: 1    Calcium-Phosphorus-Vitamin D (CITRACAL +D3 OR), Take 1 tablet by mouth  daily., Disp: , Rfl:     Vitamin B-12 (VITAMIN B12) 1000 MCG Oral Tab, Take 1 tablet (1,000 mcg total) by mouth daily., Disp: , Rfl:     loratadine (CLARITIN) 10 MG Oral Tab, Take 1 tablet (10 mg total) by mouth daily., Disp: , Rfl:     folic acid (FOLVITE) 400 MCG Oral Tab, Take 1 tablet (400 mcg total) by mouth daily., Disp: , Rfl:     Acetaminophen (TYLENOL EXTRA STRENGTH OR), Take  by mouth., Disp: , Rfl:     Multiple Vitamins-Minerals (CENTRUM SILVER) Oral Tab, Take 1 tablet by mouth daily.  , Disp: , Rfl:     There are no discontinued medications.    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[facility])    Age-related nuclear cataract of both eyes    CKD (chronic kidney disease) stage 3, GFR 30-59 ml/min ([facility])    Floater, vitreous, left    Medicare annual wellness visit, initial    Acute pharyngitis    Allergic rhinitis    Anxiety state    Backache    Benign neoplasm of skin of upper limb, including shoulder    Benign neoplasm of thyroid gland    Chronic sinusitis    Concussion    Contusion    Cough    Dermatochalasis    Dermatomycosis    Dermatophytosis of nail    Diarrhea    Disorder of arteries and arterioles    Disorder of eyelid    Dizziness    Dysfunction of eustachian tube    Head injury    Headache    Hearing loss    Hemorrhage of gastrointestinal tract    Hemorrhage of rectum and anus    Hip, thigh, leg, and ankle, insect bite, nonvenomous    Hordeolum internum    Idiopathic peripheral autonomic neuropathy    Impacted cerumen    Nonspecific abnormal cardiovascular function study    Mononeuritis of upper limb    Malaise and fatigue    Occlusion and stenosis of carotid artery    Occlusion and stenosis of carotid artery with cerebral infarction    Plantar fascial fibromatosis    Polyneuropathy in other diseases classified elsewhere ([facility])    Rash and nonspecific skin  eruption    Tension headache    Seborrheic keratosis    Symptomatic menopausal or female climacteric states    Transient cerebral ischemia    Retinal defect    Major depressive disorder, single episode, mild       Past Medical History:    Allergic rhinitis    Anxiety state, unspecified    Carotid artery disease    Cataracts, both eyes    OU    Depression    Kidney disease, chronic, stage III (GFR 30-59 ml/min) ([facility])    Melanoma, choroid, right eye (HCC)    OD, Radiation plaque for 7 days; per NG    Other and unspecified hyperlipidemia    Retinal scar of right eye    \"Retinal scarring due to melanoma/ radiation\"    Unspecified essential hypertension       Past Surgical History:   Procedure Laterality Date    Cholecystectomy      Colonoscopy      Hysterectomy      Other surgical history      Melanoma of Choroid: Radiation plaque for 7 days; treated       Family History   Problem Relation Age of Onset    Heart Attack Father 54        MI; per NG    Stroke Mother 78        per NG    Breast Cancer Mother 76        per NG    Heart Disorder Mother         CHF; per NG    Hypertension Mother         per NG    Stroke Maternal Grandmother 69        Cause of death; per NG    Diabetes Other         Aunt; per NG    Lipids Other         Family H/o Hyperlipidemia; per NG    Hypertension Maternal Uncle         per NG    Breast Cancer Paternal Aunt 70    Glaucoma Neg     Macular degeneration Neg        Social History     Occupational History    Not on file   Tobacco Use    Smoking status: Former     Current packs/day: 0.00     Average packs/day: 0.5 packs/day for 14.0 years (7.0 ttl pk-yrs)     Types: Cigarettes     Start date: 1965     Quit date: 1979     Years since quittin.4    Smokeless tobacco: Never   Vaping Use    Vaping status: Never Used   Substance and Sexual Activity    Alcohol use: No    Drug use: No    Sexual activity: Not Currently     Partners: Male       ROS      Constitutional: negative for  chills, fevers and sweats  Gastrointestinal: negative for abdominal pain, diarrhea, nausea and vomiting  Genitourinary:negative for dysuria and hematuria  Musculoskeletal:negative for arthralgias and muscle weakness  Neurological: negative for paresthesia and weakness  All others reviewed and negative.      Physical Exam     LE PHYSICAL EXAM    Constitution: Well-developed and well-nourished. Gait appears normal. No apparent distress. Alert and oriented to person, place, and time.  Integument: There are no varicosities. Skin appears moist, warm, and supple with positive hair growth. There are no color changes. No open lesions. No macerations, No Hyperkeratotic lesions.  Vascular examination: Dorsalis pedis and posterior tibial pulses are strong bilaterally with capillary filling time less than 3 seconds to all digits. There is no peripheral edema..  Neurological Sensorium: Grossly intact to sharp/dull. Vibratory: Intact.  Musculoskeletal:   5/5 pedal muscle strength b/l     Advanced trophic changes as: hair growth decrease nail changes  (thickening) pigmentary changes (discoloration) skin texture (thin, shiny)   Procedure       Nails 1 through 5 bilateral debrided with use of a nail nipper.  Patient Toller procedure well had no complications.  Neurovascular status intact to the level of the digits post procedure.     Assessment and Plan     Encounter Diagnoses   Name Primary?    Pain due to onychomycosis of toenail of right foot Yes    PVD (peripheral vascular disease)     Onychomycosis        Evaluated the patient. Discussed treatment options with the patient.  Discussed with patient proper care and hygiene for their feet.  Patient tolerated procedures well, without incident.    Instrumentation used includes nail nippers and electric  where appropriate.  Procedure: (67160 Debridement of toenails 6-10) Surgically debrided and mechanically reduced 6 or more toenails.Hemmorhage occurred none.Nails that were  debrided appeared dystrophic and caused the patient pain in shoe gear.Nails 5 Left & 5 Right.     Evaluated patient. Discussed treatment options with patient.  Discussed proper hygiene and care for feet as well as use of emollient creams (ie Urea based creams)  Answered all patient questions. Discussed offloading hyperkeratotic lesions with proper shoe gear, offloading pads, and insoles.     Performed a biopsy of the right hallux nail.  Discussed based on biopsy results we will either try topical removal      Evaluated the patient. Discussed treatment options with the patient.  Discussed with patient proper care and hygiene for their feet.  Patient tolerated procedures well, without incident.    Instrumentation used includes nail nippers and electric  where appropriate.  Procedure: (70670 Debridement of toenails 6-10) Surgically debrided and mechanically reduced 6 or more toenails.Hemmorhage occurred none.Nails that were debrided appeared dystrophic and caused the patient pain in shoe gear.Nails 5 Left & 5 Right.     Evaluated patient. Discussed treatment options with patient.  Discussed proper hygiene and care for feet as well as use of emollient creams (ie Urea based creams)  Answered all patient questions. Discussed offloading hyperkeratotic lesions with proper shoe gear, offloading pads, and insoles.     Patient was instructed to call the office or on-call podiatric physician immediately with any issues or concerns before the next scheduled visit. Patient to follow-up in clinic in 3 months      Amber Lin DPM, ABELARDO.JOSE E SMYTH  Diplomat, American Board of Foot and Ankle Surgery  Certified in Foot and Rearfoot/Ankle Reconstruction  Fellow of the American College of Foot and Ankle Surgeons  Fellowship Trained Foot and Ankle Surgeon   [facility]     11/25/2024    7:43 AM         [1]   Allergies  Allergen Reactions    Adhesive Tape RASH     Pulled the hair off

## 2025-06-10 ENCOUNTER — TELEPHONE (OUTPATIENT)
Dept: NEPHROLOGY | Facility: CLINIC | Age: 80
End: 2025-06-10

## 2025-06-10 DIAGNOSIS — N18.32 STAGE 3B CHRONIC KIDNEY DISEASE (HCC): Primary | ICD-10-CM

## 2025-06-10 NOTE — TELEPHONE ENCOUNTER
Kidney function has gotten a little worse.  Anything new going on or any new medications.  Make sure she is drinking plenty of fluids.  Avoid nonsteroidals.  Repeat labs in 1 month to make sure things remain stable.

## 2025-06-12 NOTE — TELEPHONE ENCOUNTER
Lab standing orders  25; placed standing orders for cbc, Renal Function panel; patient to repeat in one month as Dr. Campa recommended. F/U att 25    Called; related test result message.  No new medications, avoids nsaids, drinking plenty of fluids 32 oz, was having diarrhea one day before labs but feeling good now.     Agreed to repeat labs in one month.

## 2025-07-10 ENCOUNTER — APPOINTMENT (OUTPATIENT)
Dept: LAB | Facility: HOSPITAL | Age: 80
End: 2025-07-10
Attending: INTERNAL MEDICINE
Payer: MEDICARE

## 2025-07-10 DIAGNOSIS — N18.32 STAGE 3B CHRONIC KIDNEY DISEASE (HCC): ICD-10-CM

## 2025-07-10 LAB
ALBUMIN SERPL-MCNC: 4.6 G/DL (ref 3.2–4.8)
ANION GAP SERPL CALC-SCNC: 8 MMOL/L (ref 0–18)
BASOPHILS # BLD AUTO: 0.05 X10(3) UL (ref 0–0.2)
BASOPHILS NFR BLD AUTO: 1.1 %
BUN BLD-MCNC: 13 MG/DL (ref 9–23)
BUN/CREAT SERPL: 7.7 (ref 10–20)
CALCIUM BLD-MCNC: 9.5 MG/DL (ref 8.7–10.4)
CHLORIDE SERPL-SCNC: 102 MMOL/L (ref 98–112)
CO2 SERPL-SCNC: 30 MMOL/L (ref 21–32)
CREAT BLD-MCNC: 1.68 MG/DL (ref 0.55–1.02)
DEPRECATED RDW RBC AUTO: 41.6 FL (ref 35.1–46.3)
EGFRCR SERPLBLD CKD-EPI 2021: 30 ML/MIN/1.73M2 (ref 60–?)
EOSINOPHIL # BLD AUTO: 0.25 X10(3) UL (ref 0–0.7)
EOSINOPHIL NFR BLD AUTO: 5.7 %
ERYTHROCYTE [DISTWIDTH] IN BLOOD BY AUTOMATED COUNT: 12.2 % (ref 11–15)
GLUCOSE BLD-MCNC: 103 MG/DL (ref 70–99)
HCT VFR BLD AUTO: 39.7 % (ref 35–48)
HGB BLD-MCNC: 12.7 G/DL (ref 12–16)
IMM GRANULOCYTES # BLD AUTO: 0.01 X10(3) UL (ref 0–1)
IMM GRANULOCYTES NFR BLD: 0.2 %
LYMPHOCYTES # BLD AUTO: 1.47 X10(3) UL (ref 1–4)
LYMPHOCYTES NFR BLD AUTO: 33.5 %
MCH RBC QN AUTO: 29.6 PG (ref 26–34)
MCHC RBC AUTO-ENTMCNC: 32 G/DL (ref 31–37)
MCV RBC AUTO: 92.5 FL (ref 80–100)
MONOCYTES # BLD AUTO: 0.35 X10(3) UL (ref 0.1–1)
MONOCYTES NFR BLD AUTO: 8 %
NEUTROPHILS # BLD AUTO: 2.26 X10 (3) UL (ref 1.5–7.7)
NEUTROPHILS # BLD AUTO: 2.26 X10(3) UL (ref 1.5–7.7)
NEUTROPHILS NFR BLD AUTO: 51.5 %
OSMOLALITY SERPL CALC.SUM OF ELEC: 290 MOSM/KG (ref 275–295)
PHOSPHATE SERPL-MCNC: 3.8 MG/DL (ref 2.4–5.1)
PLATELET # BLD AUTO: 367 10(3)UL (ref 150–450)
POTASSIUM SERPL-SCNC: 4.7 MMOL/L (ref 3.5–5.1)
RBC # BLD AUTO: 4.29 X10(6)UL (ref 3.8–5.3)
SODIUM SERPL-SCNC: 140 MMOL/L (ref 136–145)
WBC # BLD AUTO: 4.4 X10(3) UL (ref 4–11)

## 2025-07-10 PROCEDURE — 80069 RENAL FUNCTION PANEL: CPT

## 2025-07-10 PROCEDURE — 85025 COMPLETE CBC W/AUTO DIFF WBC: CPT

## 2025-07-10 PROCEDURE — 36415 COLL VENOUS BLD VENIPUNCTURE: CPT

## 2025-07-17 ENCOUNTER — TELEPHONE (OUTPATIENT)
Dept: NEPHROLOGY | Facility: CLINIC | Age: 80
End: 2025-07-17

## (undated) DIAGNOSIS — F32.A MILD DEPRESSION: ICD-10-CM

## (undated) DIAGNOSIS — E78.5 HYPERLIPIDEMIA, UNSPECIFIED HYPERLIPIDEMIA TYPE: ICD-10-CM

## (undated) NOTE — LETTER
No referring provider defined for this encounter.       03/12/24        Patient: Valery Casillas   YOB: 1945   Date of Visit: 3/12/2024       Dear  Dr. Thom MD,      Thank you for referring Valery Casillas to my practice.  Please find my assessment and plan below.      As you know she is a 78-year-old female with a history of hypertension, hypercholesterolemia, melanoma involving her right with loss of vision, depression who I now had the pleasure of seeing for follow-up of chronic kidney disease stage III.  Overall the patient states she is doing well without any chest pain, shortness of breath, GI or urinary tract symptoms.  Has developed a cataract in the left eye.  So far does not need surgery which they are reluctant to do anyway given that she has no significant vision on the right.    On physical exam her blood pressure 124/60 with a pulse of 59 she weighed 187 pounds.  Her neck was supple without JVD.  Lungs were clear.  Heart revealed a regular rate and rhythm with an S4 but no gallops, murmurs or rubs.  Abdomen was soft, flat, nontender without organomegaly, masses or bruits.  Extremities revealed no edema.    I reviewed her most recent labs done on March 7, 2024.  Creatinine overall stable at 1.39 with an estimated GFR 39 cc/min.  Electrolytes and hemoglobin were good.    I therefore reassured the patient that overall her renal function is stable.  Blood pressures appear to be under good control.  She will continue to do CBC and renal panels quarterly.  Maintain adequate hydration.  Avoid nonsteroidals.  Will see again in 6 months for follow-up or sooner if clinically indicated.    Thank you again for allowing me to participate in the care of your patient.  If you have any questions please feel free to call.           Sincerely,   Godwin Campa MD   [facility], [facility], [place]  [address]  [place]  [address]    Document electronically generated by:  Godwin Campa MD

## (undated) NOTE — LETTER
July 3, 2024      No Recipients     Patient: Valery Casillas   YOB: 1945   Date of Visit: 7/3/2024       Dear Dr. Fonseca Recipients:    Thank you for referring Valery Casillas to me for evaluation. Here is my assessment and plan of care:    Valery Casillas is a 79 year old female.    HPI:     HPI    Patient is here for a complete exam.  Patient states her vision has remained stable.  She was seen by Dr. Cornell on 12/8/23 and she will follow-up with him in December 2024.    Last edited by Marta Shelton OT on 7/3/2024  9:46 AM.        Patient History:  Past Medical History:    Allergic rhinitis    Anxiety state, unspecified    Carotid artery disease (HCC)    Cataracts, both eyes    OU    Depression    Kidney disease, chronic, stage III (GFR 30-59 ml/min) (HCC)    Melanoma, choroid, right eye (HCC)    OD, Radiation plaque for 7 days; per NG    Other and unspecified hyperlipidemia    Retinal scar of right eye    \"Retinal scarring due to melanoma/ radiation\"    Unspecified essential hypertension       Surgical History: Vaelry Casillas has a past surgical history that includes hysterectomy (1992); cholecystectomy (1996); colonoscopy; and other surgical history (2010) (Melanoma of Choroid: Radiation plaque for 7 days; treated).    Family History   Problem Relation Age of Onset    Heart Attack Father 54        MI; per NG    Stroke Mother 78        per NG    Breast Cancer Mother 76        per NG    Heart Disorder Mother         CHF; per NG    Hypertension Mother         per NG    Stroke Maternal Grandmother 69        Cause of death; per NG    Diabetes Other         Aunt; per NG    Lipids Other         Family H/o Hyperlipidemia; per NG    Hypertension Maternal Uncle         per NG    Breast Cancer Paternal Aunt 70    Glaucoma Neg     Macular degeneration Neg        Social History:   Social History     Socioeconomic History    Marital status:    Tobacco Use    Smoking status: Former     Current  packs/day: 0.00     Average packs/day: 0.5 packs/day for 14.0 years (7.0 ttl pk-yrs)     Types: Cigarettes     Start date: 1965     Quit date: 1979     Years since quittin.5    Smokeless tobacco: Never   Vaping Use    Vaping status: Never Used   Substance and Sexual Activity    Alcohol use: No     Alcohol/week: 0.0 standard drinks of alcohol    Drug use: No    Sexual activity: Not Currently     Partners: Male   Other Topics Concern    Caffeine Concern Yes     Comment: Coffee, 2 cups; per NG    Pt has a pacemaker No    Pt has a defibrillator No    Reaction to local anesthetic No       Medications:  Current Outpatient Medications   Medication Sig Dispense Refill    amLODIPine 2.5 MG Oral Tab Take 1 tablet (2.5 mg total) by mouth daily. 90 tablet 3    metoprolol succinate ER 25 MG Oral Tablet 24 Hr Take 1 tablet (25 mg total) by mouth daily. 90 tablet 3    triamterene-hydroCHLOROthiazide (DYAZIDE) 37.5-25 MG Oral Cap 1capsule 1 times a week 12 capsule 3    citalopram 10 MG Oral Tab Take 1 tablet (10 mg total) by mouth once daily. 90 tablet 3    pravastatin 40 MG Oral Tab Take 1 tablet (40 mg total) by mouth nightly. 90 tablet 3    albuterol 108 (90 Base) MCG/ACT Inhalation Aero Soln Inhale 2 puffs into the lungs every 4 (four) hours as needed. 18 g 0    Spacer/Aero-Holding Chambers Does not apply Device Use with albuterol inhaler as needed 1 each 0    guaiFENesin 100 MG/5 ML Oral Take 5 mL (100 mg total) by mouth every 4 (four) hours as needed. 180 mL 0    aspirin 325 MG Oral Tab Take 0.5 tablets (162.5 mg total) by mouth daily. 90 tablet 1    Calcium-Phosphorus-Vitamin D (CITRACAL +D3 OR) Take 1 tablet by mouth daily.      Vitamin B-12 (VITAMIN B12) 1000 MCG Oral Tab Take 1 tablet (1,000 mcg total) by mouth daily.      loratadine (CLARITIN) 10 MG Oral Tab Take 1 tablet (10 mg total) by mouth daily.      folic acid (FOLVITE) 400 MCG Oral Tab Take 1 tablet (400 mcg total) by mouth daily.      Acetaminophen  (TYLENOL EXTRA STRENGTH OR) Take  by mouth.      Multiple Vitamins-Minerals (CENTRUM SILVER) Oral Tab Take 1 tablet by mouth daily.           Allergies:  Allergies   Allergen Reactions    Adhesive Tape RASH     Pulled the hair off       ROS:       PHYSICAL EXAM:     Base Eye Exam       Visual Acuity (Snellen - Linear)         Right Left    Dist cc LP 20/40 -2    Dist ph cc  20/40    Near cc >20/800 20/25      Correction: Glasses              Tonometry ([facility], 10:06 AM)         Right Left    Pressure 12 13              Pupils    Right eye: +APD   Left eye: round and reactive                Visual Fields         Left Right     Full     Restrictions  Total superior temporal, inferior temporal, superior nasal, inferior nasal deficiencies              Extraocular Movement    30 PD R XT per [facility]             Neuro/Psych       Oriented x3: Yes    Mood/Affect: Normal              Dilation       Both eyes: 1.0% Mydriacyl and 2.5% Bladimir Synephrine @ 10:06 AM                  Slit Lamp and Fundus Exam       Slit Lamp Exam         Right Left    Lids/Lashes Dermatochalasis, Meibomian gland dysfunction Dermatochalasis, Meibomian gland dysfunction    Conjunctiva/Sclera Normal Normal    Cornea Clear Clear    Anterior Chamber Deep and quiet Deep and quiet    Iris Normal Normal    Lens brunescent cataract with 1-2+ Cortical cataract, 4+ PSC brunescent cataract, Trace Cortical cataract    Vitreous x Vitreous floaters              Fundus Exam         Right Left    Disc x Good rim, Temporal crescent    C/D Ratio x 0.5- poor view OU 2nd to cataract    Macula red reflex only Normal    Vessels x Normal    Periphery x Normal                  Refraction       Wearing Rx         Sphere Cylinder Axis Add    Right Balance       Left -0.75 +1.50 165 +3.00      Type: Flat top bifocal              Manifest Refraction         Sphere Cylinder [place] Dist VA Add Near VA    Right Balance         Left -0.75 +1.75 165 20/40 +3.00 20/25              Final Rx          Sphere Cylinder [place] Dist VA Add Near VA    Right Balance         Left -0.75 +1.75 165 20/40 +3.00 20/25      Type: Flat top bifocal                     ASSESSMENT/PLAN:     Diagnoses and Plan:     Ocular melanoma, right (HCC)  Stable; Diagnosed in 2010 and treated by Dr. Cornell at [place].  Continue to follow up yearly.    Pt last saw Dr. Cornell in December of 2023 and will  follow up with him in a year.           Age-related nuclear cataract of both eyes  Discussed with patient that there is a significant cataract in the right eye, but due to poor vision secondary to melanoma will not consider cataract surgery unless Dr. Cornell recommends it.    Patient saw Dr. Cornell in December of 2022 and he did not recommend any treatment on either cataract at that time.   He recommended observation.   Will follow.   Patient says she is not bothered by vision in the left eye; therefore she is not interested in cataract surgery in the left eye.      New glasses today; update as needed.          Floater, vitreous, left   There is no evidence of retinal pathology.  All signs and symptoms of retinal detachment/tears explained in detail.    Patient instructed to call the office if they experience increase in floaters, increase in flashes of light, loss of vision or curtain or veil effect.       No orders of the defined types were placed in this encounter.      Meds This Visit:  Requested Prescriptions      No prescriptions requested or ordered in this encounter        Follow up instructions:  Return in about 1 year (around 7/3/2025) for complete exam.    7/3/2024  Scribed by: Vel Jc MD        If you have questions, please do not hesitate to call me. I look forward to following Valery along with you.    Sincerely,        Vel Jc MD        CC:   No Recipients    Document electronically generated by: Vel Jc MD

## (undated) NOTE — LETTER
No referring provider defined for this encounter.       09/10/24        Patient: Valery Casillas   YOB: 1945   Date of Visit: 9/10/2024       Dear  Dr. Thom MD,      Thank you for referring Valery Casillas to my practice.  Please find my assessment and plan below.      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  Overall the patient states has been doing well without any chest pain, shortness of breath, GI or urinary tract symptoms.  She brought in her blood pressure readings which she does take daily.  Overall they are under very good control with systolics in the 120s and 130s and diastolics in the 60s and heart rates in the 60s.  She has been seeing podiatry for what sounds to be a dead nail.  It is asymptomatic.  May need to have it removed.  Also had an episode where she fell to the ground at a hotel in June of this year.  Not clear if this was a syncopal episode or not.  Symptoms have not reoccurred.    On physical exam her blood pressure is 112/50 with a pulse of 64 and she weighed 183 pounds.  Her neck was supple without JVD.  Lungs were clear.  Heart revealed a regular rate and rhythm with an S4 but no gallops, murmurs or rubs.  Abdomen was soft, flat, nontender without organomegaly, masses or bruits.  Extremities revealed no edema.    I reviewed her most recent labs done on September 4, 2024.  Creatinine is crept up to 1.57.  Was 1.38 back in June or 2024.  GFR has dropped down to 33.  Sodium was 146.  The patient states that she needs to do better with her fluid intake.  Hypernatremia suggest that is so.  She will try to drink more water.  Otherwise blood pressures under good control.  Avoid nonsteroidals.  Will repeat a CBC and renal panel in 1 month to see if her numbers  improved.  If better we will continue quarterly.  I will see her again in 6 months for follow-up or sooner if clinically indicated.    Thank you again for allowing me to participate in the care of your patient.  If you have any questions please feel free to call               Sincerely,   Godwin Campa MD   [facility], [facility], [place]  [address]  [place] [facility] [address]    Document electronically generated by:  Godwin Campa MD

## (undated) NOTE — LETTER
[facility], [address]  [place],  [place]       12/18/20        Patient: Enrique Seo   YOB: 1945   Date of Visit: 12/18/2020       Dear  Dr. Leena Hsu MD,      Thank you for referring Enrique Seo to my practi I therefore informed the patient that she does have chronic kidney disease stage III. This is most likely secondary to hypertension and nephrosclerosis. Other causes will need to be excluded.   For completion sake a repeat CBC, renal panel, sed rate, conn

## (undated) NOTE — LETTER
6/2/2021    Patient: Zeenat Choudhary   MR Number: [id number]   YOB: 1945   Date of Visit: 6/2/2021   Physician: Aide Menjivar MD     Dear Medicare Patient:  Reza Yfn TO BENEFICIARY:  Please know that while a refraction is im

## (undated) NOTE — MR AVS SNAPSHOT
[facility] SPECIALTY [facility] - [address]  [id number] [phone]               Thank you for choosing us for your health care visit with Lelia Garces MD.  We are glad to serve you and happy to provide you with this summary of Take 1 tablet (10 mg total) by mouth daily. Commonly known as:  CeleXA           CITRACAL +D3 OR   Take 1 tablet by mouth daily. folic acid 114 MCG Tabs   Take 400 mcg by mouth daily.    Commonly known as:  Laura Byrd

## (undated) NOTE — LETTER
No referring provider defined for this encounter.       03/12/25        Patient: Valery Casillas   YOB: 1945   Date of Visit: 3/12/2025       Dear  Dr. Thom MD,      Thank you for referring Valery Casillas to my practice.  Please find my assessment and plan below.      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  Overall the patient states she is doing well without any chest pain, shortness of breath, GI or urinary tract symptoms.  The right eye remains stable.  She checks her blood pressures daily and they have been under good control.    On physical exam her blood pressure 124/50 with a pulse of 77 she weighed 180 pounds.  Her neck was supple without JVD.  Lungs were clear.  Heart revealed a regular rate and rhythm with an S4 but no gallops, murmurs or rubs.  Abdomen was soft, flat, nontender without organomegaly, masses or bruits.  Extremities revealed trace edema bilaterally.    I reviewed her most recent labs done on February 10, 2025.  Creatinine if anything is slightly better down to 1.40 now with an estimated GFR 38 cc/min.  Electrolytes were good.  Hemoglobin 12.6.    I therefore reassured the patient that if anything her renal function is a bit better.  She knows to maintain adequate hydration.  Avoid nonsteroidals.  Keep blood pressures under good control.  She will continue to do CBC and renal panels quarterly.  I will see her again in 6 months for follow-up or sooner if clinically indicated.    Thank you again for allowing me to participate in the care of your patient.  If you have any questions please feel free to call.           Sincerely,   Godwin Campa MD   [facility], [facility], [place]  [address]  [place]  [address]    Document electronically generated by:  Godwin Campa MD

## (undated) NOTE — MR AVS SNAPSHOT
[address]  [address]  [phone]               Thank you for choosing us for your health care visit with Darryle May, DPM.  We are glad to serve you and happy to provide yo Allergies as of May 19, 2017     No Known Allergies                   Current Medications          This list is accurate as of: 5/19/17  2:20 PM.  Always use your most recent med list.                aspirin 325 MG Tabs   Take  by mouth.            [facility] If you have questions, you can call [phone] to talk to our [facility] Staff. Remember, [employer] is NOT to be used for urgent needs. For medical emergencies, dial 911.            Visit [facility] online at  [email or web address]

## (undated) NOTE — LETTER
June 2, 2021    Lacy Zapien, [address] [facility]     Patient: Dina Keller   YOB: 1945   Date of Visit: 6/2/2021       Dear Dr. Priyank Valentine MD:    Thank you for referring Isabel cJ to me for evaluation.  Her degeneration Neg        Social History: Social History    Tobacco Use      Smoking status: Former Smoker        Packs/day: 0.50        Years: 14.00        Pack years: 7        Types: Cigarettes        Quit date: 1979        Years since quittin.4 round and reactive           Visual Fields       Left Right    Restrictions  Total superior temporal, inferior temporal, superior nasal, inferior nasal deficiencies          Extraocular Movement    R XT            Dilation     Both eyes: 1.0% Mydriacyl and recommends it. Patient saw Dr. Dennys Branch on 12/17/20 and he recommended no treatment on either cataract at that time. He recommended observation. Will follow. New glasses today; update as needed.   Discussed that new prescription is only a slight andrea

## (undated) NOTE — LETTER
No referring provider defined for this encounter. 08/29/22        Patient: Holden Riley   YOB: 1945   Date of Visit: 8/29/2022       Dear  Dr. Amara Jaquez MD,      Thank you for referring Holden Riley to my practice. Please find my assessment and plan below. As you know she is a 74-year-old female with a history of hypertension, hypercholesterolemia, melanoma involving her right eye, depression who I now had the pleasure of seeing for follow-up. Overall the patient states she is doing well without any chest pain, shortness of breath, GI or urinary tract symptoms. Just had carotid ultrasound done earlier today. She does check her blood pressures on a regular basis and they are under very good control with systolics in the 1 teens to 130 range and diastolics usually in the 12Z. On physical exam her blood pressure is 125/71 with a pulse of 57 she weighed 184 pounds. Her neck was supple without JVD. Lungs were clear. Heart revealed a regular rate and rhythm with an S4 but no gallops, murmurs or rubs. Abdomen was soft, flat, nontender without organomegaly, masses or bruits. Extremities revealed no edema. I reviewed her most recent labs done in August 23, 2022. Creatinine remains stable at 1.32 with an estimated GFR of 41 cc/min. Electrolytes and hemoglobin were normal.    I therefore reassured the patient that overall her renal function is stable. Blood pressures are under good control. She knows to maintain adequate hydration. Avoid nonsteroidals. She will continue to do CBC and renal panels quarterly. I will see her again in 6 months for follow-up or sooner if clinically indicated. Thank you again for allowing me to participate in the care of your patient. If you have any questions please feel free to call.            Sincerely,   Iraida Travis MD   [facility] , [address] [place] [address]  [address] [place] [address]  [address] [facility] [address]    Document electronically generated by:  Zora Quach MD

## (undated) NOTE — LETTER
12/05/18        [address] [facility]      Dear Pennie Rowland,    [address] [facility] records indicate that you have outstanding lab work and or testing that was ordered for you and has not yet been completed:  Orders Placed This E

## (undated) NOTE — Clinical Note
Patient Name: Mariaa Coppola  : 1945  MRN: [id number]  Patient Address: [address] [place] [address]      Coronavirus Disease 2019 (COVID-19)     [facility] is committed to the safety and well-being of our patients, symptoms carefully. If your symptoms get worse, call your healthcare provider immediately. 3. Get rest and stay hydrated. 4. If you have a medical appointment, call the healthcare provider ahead of time and tell them that you have or may have COVID-19. without the use of fever-reducing medications; and  · Improvement in respiratory symptoms (e.g., cough, shortness of breath); and  · At least 10 days have passed since symptoms first appeared OR if asymptomatic patient or date of symptom onset is unclear t convalescent plasma donors must:    · Have had a confirmed diagnosis of COVID-19  · Be symptom-free for at least 14 days*    *Some people will be required to have a repeat COVID-19 test in order to be eligible to donate.  If you’re instructed by Lindsey Nickerson yoana Post-COVID conditions to be random. Researchers are trying to identify similarities between people with a Post-COVID condition to better understand if there are risk factors. How do I prevent a Post-COVID condition?   The best way to prevent the long-t

## (undated) NOTE — LETTER
02/20/18        [address] [facility]      Dear Nani Figueroa,    [address] [facility] records indicate that you have outstanding lab work and or testing that was ordered for you and has not yet been completed:          Margaux Trujillo

## (undated) NOTE — MR AVS SNAPSHOT
[address] [address]  [phone]               Thank you for choosing us for your health care visit with Phillip Agarwal MD.  We are glad to serve you and happy to provide you with this summary Follow-up Instructions     Return in about 3 months (around 7/25/2017).          Referral Details     Referred By    Referred To    Alexa Jaimes MD   [address] [place] [id number]   Phone:  [phone]   Fax:  [phone]    Diagnoses:  Tory Dos Santos She is able to manage all her regular activities and stay social at this point.   Continue the same dose of medications and follow-up as discussed         Cellulitis, umbilical     No Known Allergies  Started on Keflex 500 mg 1 tablet 2 times daily for 7 da Today's Vital Signs     BP Pulse Height Weight BMI    136/80 mmHg 59 5' 3.5\" (1.613 m) 187 lb 3.2 oz (84.913 kg) 32.64 kg/m2         Current Medications          This list is accurate as of: 4/25/17 11:49 AM.  Always use your most recent med list. visit,  view other health information, and more. To sign up or find more information, go to [email or web address] and click on the Sign Up Now link in the [employer] box.      Enter your [employer] Activation Code exactly as it appears below along with yo

## (undated) NOTE — LETTER
No referring provider defined for this encounter. 08/23/21        Patient: Enrique Seo   YOB: 1945   Date of Visit: 8/23/2021       Dear  Dr. Leena Hsu MD,      Thank you for referring Enrique Seo to my practice.   Please generated by:  Elizabeth Pollard MD

## (undated) NOTE — LETTER
No referring provider defined for this encounter. 02/28/22        Patient: Vanda Lorenzana   YOB: 1945   Date of Visit: 2/28/2022       Dear  Dr. Chris Platt MD,      Thank you for referring Vanda Lorenzana to my practice. Please find my assessment and plan below. As you know she is a 78-year-old female with a history of hypertension, hypercholesterolemia, melanoma involving her eyes, depression who I now had the pleasure of seeing for follow-up of chronic kidney disease stage III. Overall the patient states she is doing well without any chest pain, shortness of breath, GI or urinary tract symptoms. She did relate however that her  recently passed away in December 2021. He had been residing in a nursing home for a couple of years. She has been checking her blood pressures regularly and systolics usually in the 1 teens to 633M with diastolics in the 78J and 48W. On physical exam her blood pressure is 113/66 with a pulse of 76 and she weighed 184 pounds. Her neck was supple without JVD. Lungs were clear. Heart revealed a regular rate and rhythm with an S4 but no gallops, murmurs or rubs. Abdomen was soft, flat, nontender without organomegaly, masses or bruits. Extremities revealed no edema. I reviewed her most recent labs done on February 22, 2022. Creatinine stable 1.32 with an estimated GFR 39 cc/min. Electrolytes and hemoglobin were normal.  Urinalysis was unremarkable. I therefore informed the patient that overall her renal function is stable. Blood pressures are under good control. Continue current medications. She will continue to do CBC and renal panels quarterly. I will see her again in 6 months for follow-up or sooner if clinically indicated. Thank you again for allowing me to participate in the care of your patient. If you have any questions please feel free to call.            Sincerely,   Rafia Escobar MD   [address], [facility]  [address] [place] [address]  [address] [facility] [address]    Document electronically generated by:  Brittney Castillo MD

## (undated) NOTE — LETTER
September 8, 2021     [place]  [address] [address]      Dear Traci Model:    Below are the results from your recent visit:    Resulted Orders   [facility] DARYL 2D+3D SCREENING BILAT (CPT=77067/13800)    Narrative    PROCEDURE PALPABLE LUMP SHOULD BE BIOPSIED. For patients over the age of 36, the target due date for the patient's next mammogram has been entered into a reminder system.        Patient received a discharge summary from the technologist after completion of exam

## (undated) NOTE — LETTER
No referring provider defined for this encounter. 02/27/23        Patient: Vanda Lorenzana   YOB: 1945   Date of Visit: 2/27/2023       Dear  Dr. Feroz Hansen MD,      Thank you for referring Vanda Lorenzana to my practice. Please find my assessment and plan below. As you know she is a 78-year-old female with a history of hypertension, hypercholesterolemia, melanoma involving her right eye, depression who I now had the pleasure of seeing for follow-up of chronic kidney disease stage III. Overall the patient states she has been doing well without any chest pain, shortness of breath, GI or urinary tract symptoms. She has been checking her blood pressures daily at home systolics in the 985F to 016Z with diastolics in the 49C and 73Z. She has minimal normal vision in the right eye and a portion is developing a cataract in the left eye. May need surgery in the future but for now they are trying to hold off for as long as they can. On physical exam initial blood pressure 175/82. Repeat was 136/80 with a pulse of 75 and she weighed 180 pounds. Her neck was supple without JVD. Lungs were clear. Heart revealed a regular rate and rhythm with an S4 but no gallops, murmurs or rubs. Abdomen was soft, flat, nontender without organomegaly, masses or bruits. Extremities revealed no edema. Reviewed her most recent labs done on February 21, 2023. Creatinine if anything slightly better down to 1.29 with an estimated GFR of 42 cc/min. Electrolytes and hemoglobin were okay. I therefore reassured the patient that overall renal function is stable. Blood pressure readings at home are very good. Therefore continue current medications. She will continue to do CBC and renal panels quarterly. I will see her again in 6 months for follow-up or sooner if clinically indicated. Maintain adequate hydration. Avoid nonsteroidals.     Thank you again for allowing me to participate in the care of your patient. If you have any questions please feel free to call.            Sincerely,   Kari Nichols MD   [facility], [address] [place] [address]  [address] [facility] [address]  [facility] [place] [address]    Document electronically generated by:  Kari Nichols MD

## (undated) NOTE — LETTER
No referring provider defined for this encounter. 02/04/21        Patient: Gayle Enamorado   YOB: 1945   Date of Visit: 2/2/2021       Dear  Dr. Navid Hope MD,      Thank you for referring Gayle Enamorado to my practice.   Please

## (undated) NOTE — MR AVS SNAPSHOT
[place]  [phone]               Thank you for choosing us for your health care visit with Dwight Arzola MD.  We are glad to serve you and happy to provide you with this summa significant enough to remove at this time. Will follow. New glasses RX written. 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 Josue Franco at [facility]. Continue to follow up yearly.             Allergies as of Jun 14, 2017     No Klevero Your unique [facility] Access Code: [email or web address]  Expires: 6/24/2017 11:49 AM    If you have questions, you can call [phone] to talk to our [facility] Staff. Remember, [facility] is NOT to be used for urgent needs. For medical emergencies, dial 911.